# Patient Record
Sex: FEMALE | Race: BLACK OR AFRICAN AMERICAN | NOT HISPANIC OR LATINO | Employment: FULL TIME | URBAN - METROPOLITAN AREA
[De-identification: names, ages, dates, MRNs, and addresses within clinical notes are randomized per-mention and may not be internally consistent; named-entity substitution may affect disease eponyms.]

---

## 2017-06-01 ENCOUNTER — HOSPITAL ENCOUNTER (INPATIENT)
Facility: OTHER | Age: 22
LOS: 1 days | Discharge: HOME | End: 2017-06-02
Attending: EMERGENCY MEDICINE | Admitting: OBSTETRICS & GYNECOLOGY
Payer: MEDICAID

## 2017-06-01 DIAGNOSIS — R00.0 TACHYCARDIA: ICD-10-CM

## 2017-06-01 DIAGNOSIS — I48.91 ATRIAL FIBRILLATION: ICD-10-CM

## 2017-06-01 DIAGNOSIS — I48.91 ATRIAL FIBRILLATION WITH RAPID VENTRICULAR RESPONSE: Primary | ICD-10-CM

## 2017-06-01 DIAGNOSIS — I50.9 HEART FAILURE: ICD-10-CM

## 2017-06-01 DIAGNOSIS — Z3A.33 33 WEEKS GESTATION OF PREGNANCY: ICD-10-CM

## 2017-06-01 PROBLEM — Z3A.34 34 WEEKS GESTATION OF PREGNANCY: Status: ACTIVE | Noted: 2017-06-01

## 2017-06-01 PROBLEM — E87.6 HYPOKALEMIA: Status: ACTIVE | Noted: 2017-06-01

## 2017-06-01 LAB
ALBUMIN SERPL BCP-MCNC: 2.6 G/DL
ALP SERPL-CCNC: 51 U/L
ALT SERPL W/O P-5'-P-CCNC: 15 U/L
ANION GAP SERPL CALC-SCNC: 7 MMOL/L
APTT BLDCRRT: 28.4 SEC
AST SERPL-CCNC: 19 U/L
BASOPHILS # BLD AUTO: 0.01 K/UL
BASOPHILS # BLD AUTO: 0.02 K/UL
BASOPHILS NFR BLD: 0.1 %
BASOPHILS NFR BLD: 0.3 %
BILIRUB SERPL-MCNC: 0.3 MG/DL
BUN SERPL-MCNC: 7 MG/DL
BUN SERPL-MCNC: 9 MG/DL (ref 6–30)
CALCIUM SERPL-MCNC: 8.4 MG/DL
CHLORIDE SERPL-SCNC: 105 MMOL/L (ref 95–110)
CHLORIDE SERPL-SCNC: 113 MMOL/L
CO2 SERPL-SCNC: 22 MMOL/L
CREAT SERPL-MCNC: 0.5 MG/DL (ref 0.5–1.4)
CREAT SERPL-MCNC: 0.6 MG/DL
D DIMER PPP IA.FEU-MCNC: 2.5 MG/L FEU
DIFFERENTIAL METHOD: ABNORMAL
DIFFERENTIAL METHOD: ABNORMAL
EOSINOPHIL # BLD AUTO: 0.1 K/UL
EOSINOPHIL # BLD AUTO: 0.1 K/UL
EOSINOPHIL NFR BLD: 0.7 %
EOSINOPHIL NFR BLD: 0.9 %
ERYTHROCYTE [DISTWIDTH] IN BLOOD BY AUTOMATED COUNT: 13.3 %
ERYTHROCYTE [DISTWIDTH] IN BLOOD BY AUTOMATED COUNT: 13.3 %
EST. GFR  (AFRICAN AMERICAN): >60 ML/MIN/1.73 M^2
EST. GFR  (NON AFRICAN AMERICAN): >60 ML/MIN/1.73 M^2
GLUCOSE SERPL-MCNC: 72 MG/DL
GLUCOSE SERPL-MCNC: 95 MG/DL (ref 70–110)
HCT VFR BLD AUTO: 33.9 %
HCT VFR BLD AUTO: 36.4 %
HCT VFR BLD CALC: 37 %PCV (ref 36–54)
HGB BLD-MCNC: 11.4 G/DL
HGB BLD-MCNC: 12.3 G/DL
INR PPP: 0.9
LACTATE SERPL-SCNC: 1.8 MMOL/L
LYMPHOCYTES # BLD AUTO: 1.9 K/UL
LYMPHOCYTES # BLD AUTO: 2.4 K/UL
LYMPHOCYTES NFR BLD: 25.4 %
LYMPHOCYTES NFR BLD: 35.4 %
MAGNESIUM SERPL-MCNC: 1.6 MG/DL
MAGNESIUM SERPL-MCNC: 1.6 MG/DL
MCH RBC QN AUTO: 30.7 PG
MCH RBC QN AUTO: 31.1 PG
MCHC RBC AUTO-ENTMCNC: 33.6 %
MCHC RBC AUTO-ENTMCNC: 33.8 %
MCV RBC AUTO: 91 FL
MCV RBC AUTO: 92 FL
MONOCYTES # BLD AUTO: 0.4 K/UL
MONOCYTES # BLD AUTO: 0.5 K/UL
MONOCYTES NFR BLD: 6.4 %
MONOCYTES NFR BLD: 6.5 %
NEUTROPHILS # BLD AUTO: 3.8 K/UL
NEUTROPHILS # BLD AUTO: 5.1 K/UL
NEUTROPHILS NFR BLD: 56.3 %
NEUTROPHILS NFR BLD: 67.1 %
PLATELET # BLD AUTO: 187 K/UL
PLATELET # BLD AUTO: 187 K/UL
PLATELET # BLD AUTO: 207 K/UL
PMV BLD AUTO: 10.1 FL
PMV BLD AUTO: 9.9 FL
PMV BLD AUTO: 9.9 FL
POC IONIZED CALCIUM: 1.1 MMOL/L (ref 1.06–1.42)
POC TCO2 (MEASURED): 21 MMOL/L (ref 23–29)
POTASSIUM BLD-SCNC: 3.1 MMOL/L (ref 3.5–5.1)
POTASSIUM SERPL-SCNC: 3.7 MMOL/L
PROT SERPL-MCNC: 5.9 G/DL
PROTHROMBIN TIME: 9.9 SEC
RBC # BLD AUTO: 3.71 M/UL
RBC # BLD AUTO: 3.96 M/UL
SAMPLE: ABNORMAL
SODIUM BLD-SCNC: 138 MMOL/L (ref 136–145)
SODIUM SERPL-SCNC: 142 MMOL/L
TROPONIN I SERPL DL<=0.01 NG/ML-MCNC: 0.01 NG/ML
TSH SERPL DL<=0.005 MIU/L-ACNC: 0.86 UIU/ML
WBC # BLD AUTO: 6.72 K/UL
WBC # BLD AUTO: 7.61 K/UL

## 2017-06-01 PROCEDURE — 85610 PROTHROMBIN TIME: CPT

## 2017-06-01 PROCEDURE — 25000003 PHARM REV CODE 250: Performed by: INTERNAL MEDICINE

## 2017-06-01 PROCEDURE — 80053 COMPREHEN METABOLIC PANEL: CPT

## 2017-06-01 PROCEDURE — 93005 ELECTROCARDIOGRAM TRACING: CPT

## 2017-06-01 PROCEDURE — 85025 COMPLETE CBC W/AUTO DIFF WBC: CPT

## 2017-06-01 PROCEDURE — 99291 CRITICAL CARE FIRST HOUR: CPT | Mod: ,,, | Performed by: EMERGENCY MEDICINE

## 2017-06-01 PROCEDURE — 96361 HYDRATE IV INFUSION ADD-ON: CPT

## 2017-06-01 PROCEDURE — 83735 ASSAY OF MAGNESIUM: CPT

## 2017-06-01 PROCEDURE — 99222 1ST HOSP IP/OBS MODERATE 55: CPT | Mod: ,,, | Performed by: OBSTETRICS & GYNECOLOGY

## 2017-06-01 PROCEDURE — 96376 TX/PRO/DX INJ SAME DRUG ADON: CPT

## 2017-06-01 PROCEDURE — 36415 COLL VENOUS BLD VENIPUNCTURE: CPT

## 2017-06-01 PROCEDURE — 83605 ASSAY OF LACTIC ACID: CPT

## 2017-06-01 PROCEDURE — 93010 ELECTROCARDIOGRAM REPORT: CPT | Mod: ,,, | Performed by: INTERNAL MEDICINE

## 2017-06-01 PROCEDURE — 25500020 PHARM REV CODE 255: Performed by: EMERGENCY MEDICINE

## 2017-06-01 PROCEDURE — 96365 THER/PROPH/DIAG IV INF INIT: CPT

## 2017-06-01 PROCEDURE — 85379 FIBRIN DEGRADATION QUANT: CPT

## 2017-06-01 PROCEDURE — 20000000 HC ICU ROOM

## 2017-06-01 PROCEDURE — 94761 N-INVAS EAR/PLS OXIMETRY MLT: CPT

## 2017-06-01 PROCEDURE — 25000003 PHARM REV CODE 250: Performed by: OBSTETRICS & GYNECOLOGY

## 2017-06-01 PROCEDURE — 96366 THER/PROPH/DIAG IV INF ADDON: CPT

## 2017-06-01 PROCEDURE — 84443 ASSAY THYROID STIM HORMONE: CPT

## 2017-06-01 PROCEDURE — 85025 COMPLETE CBC W/AUTO DIFF WBC: CPT | Mod: 91

## 2017-06-01 PROCEDURE — 99900035 HC TECH TIME PER 15 MIN (STAT)

## 2017-06-01 PROCEDURE — 99223 1ST HOSP IP/OBS HIGH 75: CPT | Mod: ,,, | Performed by: INTERNAL MEDICINE

## 2017-06-01 PROCEDURE — 25000003 PHARM REV CODE 250: Performed by: EMERGENCY MEDICINE

## 2017-06-01 PROCEDURE — 84484 ASSAY OF TROPONIN QUANT: CPT

## 2017-06-01 PROCEDURE — 85730 THROMBOPLASTIN TIME PARTIAL: CPT

## 2017-06-01 PROCEDURE — 83735 ASSAY OF MAGNESIUM: CPT | Mod: 91

## 2017-06-01 PROCEDURE — 99285 EMERGENCY DEPT VISIT HI MDM: CPT | Mod: 25

## 2017-06-01 PROCEDURE — 96375 TX/PRO/DX INJ NEW DRUG ADDON: CPT

## 2017-06-01 RX ORDER — POTASSIUM CHLORIDE 20 MEQ/15ML
60 SOLUTION ORAL
Status: COMPLETED | OUTPATIENT
Start: 2017-06-01 | End: 2017-06-01

## 2017-06-01 RX ORDER — PRENATAL WITH FERROUS FUM AND FOLIC ACID 3080; 920; 120; 400; 22; 1.84; 3; 20; 10; 1; 12; 200; 27; 25; 2 [IU]/1; [IU]/1; MG/1; [IU]/1; MG/1; MG/1; MG/1; MG/1; MG/1; MG/1; UG/1; MG/1; MG/1; MG/1; MG/1
1 TABLET ORAL DAILY
Status: DISCONTINUED | OUTPATIENT
Start: 2017-06-02 | End: 2017-06-02 | Stop reason: HOSPADM

## 2017-06-01 RX ORDER — METOPROLOL TARTRATE 1 MG/ML
5 INJECTION, SOLUTION INTRAVENOUS EVERY 4 HOURS PRN
Status: DISCONTINUED | OUTPATIENT
Start: 2017-06-01 | End: 2017-06-02

## 2017-06-01 RX ORDER — SODIUM CHLORIDE, SODIUM LACTATE, POTASSIUM CHLORIDE, CALCIUM CHLORIDE 600; 310; 30; 20 MG/100ML; MG/100ML; MG/100ML; MG/100ML
INJECTION, SOLUTION INTRAVENOUS CONTINUOUS
Status: DISCONTINUED | OUTPATIENT
Start: 2017-06-01 | End: 2017-06-02

## 2017-06-01 RX ORDER — METOPROLOL TARTRATE 1 MG/ML
5 INJECTION, SOLUTION INTRAVENOUS
Status: COMPLETED | OUTPATIENT
Start: 2017-06-01 | End: 2017-06-01

## 2017-06-01 RX ORDER — ESMOLOL HYDROCHLORIDE 20 MG/ML
50 INJECTION, SOLUTION INTRAVENOUS CONTINUOUS
Status: DISCONTINUED | OUTPATIENT
Start: 2017-06-01 | End: 2017-06-01

## 2017-06-01 RX ORDER — HEPARIN SODIUM 10000 [USP'U]/100ML
17 INJECTION, SOLUTION INTRAVENOUS CONTINUOUS
Status: DISCONTINUED | OUTPATIENT
Start: 2017-06-01 | End: 2017-06-01

## 2017-06-01 RX ORDER — DIPHENHYDRAMINE HYDROCHLORIDE 50 MG/ML
25 INJECTION INTRAMUSCULAR; INTRAVENOUS EVERY 4 HOURS PRN
Status: DISCONTINUED | OUTPATIENT
Start: 2017-06-01 | End: 2017-06-02

## 2017-06-01 RX ORDER — ESMOLOL HYDROCHLORIDE 10 MG/ML
10 INJECTION INTRAVENOUS CONTINUOUS
Status: DISCONTINUED | OUTPATIENT
Start: 2017-06-01 | End: 2017-06-01 | Stop reason: SDUPTHER

## 2017-06-01 RX ORDER — ONDANSETRON 8 MG/1
8 TABLET, ORALLY DISINTEGRATING ORAL EVERY 8 HOURS PRN
Status: DISCONTINUED | OUTPATIENT
Start: 2017-06-01 | End: 2017-06-02

## 2017-06-01 RX ORDER — METOPROLOL TARTRATE 1 MG/ML
5 INJECTION, SOLUTION INTRAVENOUS
Status: DISCONTINUED | OUTPATIENT
Start: 2017-06-01 | End: 2017-06-02

## 2017-06-01 RX ORDER — DILTIAZEM HCL 1 MG/ML
10 INJECTION, SOLUTION INTRAVENOUS CONTINUOUS
Status: DISCONTINUED | OUTPATIENT
Start: 2017-06-01 | End: 2017-06-02

## 2017-06-01 RX ORDER — SIMETHICONE 80 MG
1 TABLET,CHEWABLE ORAL EVERY 6 HOURS PRN
Status: DISCONTINUED | OUTPATIENT
Start: 2017-06-01 | End: 2017-06-02 | Stop reason: HOSPADM

## 2017-06-01 RX ORDER — POTASSIUM CHLORIDE 20 MEQ/15ML
40 SOLUTION ORAL
Status: COMPLETED | OUTPATIENT
Start: 2017-06-01 | End: 2017-06-01

## 2017-06-01 RX ORDER — HEPARIN SODIUM,PORCINE/D5W 25000/250
16 INTRAVENOUS SOLUTION INTRAVENOUS CONTINUOUS
Status: DISCONTINUED | OUTPATIENT
Start: 2017-06-01 | End: 2017-06-02

## 2017-06-01 RX ORDER — AMOXICILLIN 250 MG
1 CAPSULE ORAL NIGHTLY PRN
Status: DISCONTINUED | OUTPATIENT
Start: 2017-06-01 | End: 2017-06-02 | Stop reason: HOSPADM

## 2017-06-01 RX ADMIN — METOPROLOL TARTRATE 5 MG: 5 INJECTION INTRAVENOUS at 03:06

## 2017-06-01 RX ADMIN — METOPROLOL TARTRATE 5 MG: 5 INJECTION INTRAVENOUS at 10:06

## 2017-06-01 RX ADMIN — SODIUM CHLORIDE, SODIUM LACTATE, POTASSIUM CHLORIDE, AND CALCIUM CHLORIDE: .6; .31; .03; .02 INJECTION, SOLUTION INTRAVENOUS at 08:06

## 2017-06-01 RX ADMIN — HEPARIN SODIUM AND DEXTROSE 16 UNITS/KG/HR: 10000; 5 INJECTION INTRAVENOUS at 11:06

## 2017-06-01 RX ADMIN — IOHEXOL 75 ML: 350 INJECTION, SOLUTION INTRAVENOUS at 03:06

## 2017-06-01 RX ADMIN — SODIUM CHLORIDE, SODIUM LACTATE, POTASSIUM CHLORIDE, AND CALCIUM CHLORIDE 1000 ML: .6; .31; .03; .02 INJECTION, SOLUTION INTRAVENOUS at 03:06

## 2017-06-01 RX ADMIN — DILTIAZEM HYDROCHLORIDE 10 MG/HR: 5 INJECTION INTRAVENOUS at 09:06

## 2017-06-01 RX ADMIN — POTASSIUM CHLORIDE 60 MEQ: 20 SOLUTION ORAL at 03:06

## 2017-06-01 RX ADMIN — METOPROLOL TARTRATE 5 MG: 5 INJECTION INTRAVENOUS at 01:06

## 2017-06-01 RX ADMIN — POTASSIUM CHLORIDE 40 MEQ: 40 SOLUTION ORAL at 11:06

## 2017-06-01 RX ADMIN — METOPROLOL TARTRATE 5 MG: 5 INJECTION INTRAVENOUS at 02:06

## 2017-06-01 RX ADMIN — SODIUM CHLORIDE, SODIUM LACTATE, POTASSIUM CHLORIDE, AND CALCIUM CHLORIDE 1000 ML: .6; .31; .03; .02 INJECTION, SOLUTION INTRAVENOUS at 01:06

## 2017-06-01 RX ADMIN — ESMOLOL HYDROCHLORIDE 50 MCG/KG/MIN: 20 INJECTION INTRAVENOUS at 04:06

## 2017-06-01 NOTE — ED TRIAGE NOTES
Pt had walked down to the cafeteria and became extremely diaphoretic and SOB. Pt reported chest tightness. PT is 33 weeks pregnant.

## 2017-06-01 NOTE — ED PROVIDER NOTES
Encounter Date: 6/1/2017    SCRIBE #1 NOTE: I, Karin Lincoln, am scribing for, and in the presence of, Dr. Singh.       History     Chief Complaint   Patient presents with    Tachycardia     Review of patient's allergies indicates:  Allergies not on file  Time seen by provider: 1:29 PM    This is a 22 y.o. female at 33 weeks gestational age  who presents with complaint of acute onset  Of diaphoresis, shortness of breath, and tachycardia beginning shortly prior to arrival. Patient explains she was walking to clinic upstairs when symptoms acutely onset. She also experienced chest tightness at this time, which is now resolved. She denies a hx of tachycardia, DVT, and pulmonary embolism, as well as associated symptoms of leg edema, fever, chest pain, and pelvic pain. Patient endorses an episode of emesis this morning, but otherwise felt at baseline.         The history is provided by the patient and medical records.     History reviewed. No pertinent past medical history.  History reviewed. No pertinent surgical history.  No family history on file.  Social History   Substance Use Topics    Smoking status: Never Smoker    Smokeless tobacco: Not on file    Alcohol use No     Review of Systems   Constitutional: Positive for diaphoresis. Negative for fever.   HENT: Negative for nosebleeds.    Eyes: Negative for redness.   Respiratory: Positive for chest tightness and shortness of breath.    Cardiovascular: Negative for chest pain and leg swelling.   Gastrointestinal: Positive for vomiting.   Genitourinary: Negative for pelvic pain.   Musculoskeletal: Negative for neck pain.   Skin: Negative for rash.   Neurological: Negative for speech difficulty.       Physical Exam     Initial Vitals [06/01/17 1325]   BP Pulse Resp Temp SpO2   138/85 (!) 144 (!) 26 98 °F (36.7 °C) 99 %     Physical Exam    Vitals reviewed.  Constitutional:   22 year old pregnant  female in moderate distress   HENT:   Head: Normocephalic  and atraumatic.   No intraoral lesions noted   Eyes: Pupils are equal, round, and reactive to light.   Neck: No tracheal deviation present.   Cardiovascular: Intact distal pulses. Tachycardia present.    Pulmonary/Chest: No stridor. She is in respiratory distress (mild).   Clear breath sounds bilaterally   Abdominal: There is no tenderness.   Abdomen gravid   Musculoskeletal: She exhibits no edema.   Moves all extremities x 4   Neurological: She is alert and oriented to person, place, and time.   Psychiatric: Thought content normal.   Mildly anxious, appropriate for setting         ED Course   Critical Care  Date/Time: 6/1/2017 1:30 PM  Performed by: REBECCA CRANE  Authorized by: REBECCA CRANE   Direct patient critical care time: 25 minutes  Additional history critical care time: 5 minutes  Ordering / reviewing critical care time: 10 minutes  Documentation critical care time: 5 minutes  Consulting other physicians critical care time: 10 minutes  Total critical care time (exclusive of procedural time) : 55 minutes  Critical care was necessary to treat or prevent imminent or life-threatening deterioration of the following conditions: cardiac failure.  Critical care was time spent personally by me on the following activities: discussions with consultants, evaluation of patient's response to treatment, obtaining history from patient or surrogate, ordering and review of laboratory studies, pulse oximetry, re-evaluation of patient's condition, ordering and review of radiographic studies, ordering and performing treatments and interventions, examination of patient, interpretation of cardiac output measurements and discussions with primary provider.        Labs Reviewed   CBC W/ AUTO DIFFERENTIAL - Abnormal; Notable for the following:        Result Value    RBC 3.96 (*)     Hematocrit 36.4 (*)     MCH 31.1 (*)     All other components within normal limits   D DIMER, QUANTITATIVE - Abnormal; Notable for the  following:     D-Dimer 2.50 (*)     All other components within normal limits    Narrative:     ADD ON PER DR CRANE order [id number] Ddimer @1457 6/1/17   ISTAT PROCEDURE - Abnormal; Notable for the following:     POC Potassium 3.1 (*)     POC TCO2 (MEASURED) 21 (*)     All other components within normal limits   MAGNESIUM   TSH    Narrative:     ADD ON PER DR CRANE order [id number] Ddimer @1457 6/1/17   TROPONIN I    Narrative:     ADD ON PER DR CRANE order [id number] Ddimer @1457 6/1/17   LACTIC ACID, PLASMA   D DIMER, QUANTITATIVE   ISTAT CHEM8     EKG Readings: (Independently Interpreted)   Atrial fibrillation with a normal axis. Twave inversions lead 3 and AVF. 152 bpm     Imaging Results          CTA Chest Non-Coronary (PE Study) (Final result)  Result time 06/01/17 15:48:38    Final result by Lobo Moya III, MD (06/01/17 15:48:38)                 Impression:        1. No evidence of pulmonary thromboembolism.    Preliminary findings of the case were observed by Dr. Hadley at 1540 and discussed by Dr. Hadley with DR. REBECCA CRANE at, 15:45:32 on 06/01/17.  ______________________________________     Electronically signed by resident: DYLAN HADLEY MD  Date:     06/01/17  Time:    15:47            As the supervising and teaching physician, I personally reviewed the images and resident's interpretation and I agree with the findings.            Electronically signed by: LOBO MOYA  Date:     06/01/17  Time:    15:48              Narrative:    Time of Procedure: 06/01/17 15:22:39  Accession # [id number]    Technique: The chest was surveyed from the costophrenic angles through the lung apices at 3-mm increments after the administration of 75 cc of Omnipaque 350 intravenous contrast material according to the PE protocol which is optimized for vascular contrast resolution.  Axial, sagittal and coronal maximum intensity projection images were reviewed.    Comparison:  None    Findings:    Examination of the soft tissue and vascular structures at the base of the neck is unremarkable.    There is a left-sided aortic arch with 3 branch vessels.  The thoracic aorta maintains normal caliber, contour, and course without significant atherosclerotic calcification.  There is no evidence of aneurysmal dilation or dissection.    The pulmonary arteries distribute normally without evidence of filling defect to indicate pulmonary thromboembolism.      The heart is not enlarged and there is no evidence of pericardial effusion.    There is no axillary, mediastinal, or hilar lymph node enlargement.      The esophagus maintains a normal course and caliber.    Limited images of the upper abdomen obtained during the course of this dedicated thoracic CT demonstrate nothing unusual.    The osseous structures are unremarkable.    The trachea and proximal airways are patent.    The lungs are symmetrically expanded and without evidence of consolidation, pneumothorax, mass, or significant pleural thickening.  There is no evidence of pleural fluid.                                Medical Decision Making:   History:   Old Medical Records: I decided to obtain old medical records.  Differential Diagnosis:   Lethal arrhythmia, thyroid dysfunction, pregnancy complication, and pulmonary embolist  Independently Interpreted Test(s):   I have ordered and independently interpreted EKG Reading(s) - see prior notes  Clinical Tests:   Lab Tests: Ordered and Reviewed  Radiological Study: Ordered  Medical Tests: Ordered and Reviewed  Other:   I have discussed this case with another health care provider.            Scribe Attestation:   Scribe #1: I performed the above scribed service and the documentation accurately describes the services I performed. I attest to the accuracy of the note.    Attending Attestation:           Physician Attestation for Scribe:  Physician Attestation Statement for Scribe #1: I, Dr. Singh,  reviewed documentation, as scribed by Karin Lincoln in my presence, and it is both accurate and complete.         Attending ED Notes:   Fetal heart tones 126    Emergency department evaluation today reveals evidence of minimal hypokalemia as well as a moderately elevated serum d-dimer in this patient at 33 weeks gestational age presenting with the acute onset of chest pain, shortness of breath, and significant tachycardia noted to be atrial fibrillation with rapid ventricular response on EKG.  The patient has undergone a CT and she gram of the chest/PE study which does not reveal any evidence of acute embolic disease.  The serum troponin and TSH are notably within normal limits.  The patient has not exhibited sustained improvement of her heart rate with IV beta blocker therapy, and due to her relative hypotension, is not a candidate for diltiazem.  The patient has maintained a clear mental status with adequate oxygenation without a complaint of chest pain throughout her emergency department observation.  An esmolol infusion has been initiated in an attempt to achieve rate control.  Findings and concerns have been discussed with the patient's managing obstetrician Dr. Duran who has accepted the patient for transfer to his inpatient facility for further monitoring and therapy.  She will be transferred to the labor and delivery department of the [facility] campus in fair condition.  1700 - patient exhibits poor response to esmolol therapy with -160 with normal oxygenation and mentation. I must delay transport at this time and consult Cardiology for further recommendations    1830 - Patient has been urgently evaluated by cardiology on call who recommends maximizing esmolol therapy in an attempt to achieve rate control.  The patient continues to exhibit a ventricular rate of 140-1 50 bpm without associated shortness of breath or altered mental status.  In the interim, the accepting physician at [facility] has  determined that this patient requires an ICU setting in maternal-fetal medicine which is not available at that facility.  I have further discussed the patient's presenting complaint and status with another obstetrician at [facility], Dr. Stephen, in an attempt to obtain inpatient management.  At the time of my shift completion, the patient is awaiting acceptance and admission at this facility or [facility] for further therapy and management of her persistent tachycardia in fair condition.              ED Course     Clinical Impression:   The primary encounter diagnosis was Atrial fibrillation with rapid ventricular response. Diagnoses of Tachycardia and 33 weeks gestation of pregnancy were also pertinent to this visit.    Disposition:        Peterson Singh MD  06/01/17 1641       Peterson Singh MD  06/01/17 1833       Peterson Singh MD  06/01/17 183

## 2017-06-01 NOTE — ED NOTES
APPEARANCE: awake and alert in NAD.  SKIN: warm, dry and intact. No breakdown or bruising.  MUSCULOSKELETAL: Patient moving all extremities spontaneously, no obvious swelling or deformities noted. Ambulates independently.  RESPIRATORY:shortness of breath reported. rr 18 even and unlabored All breath sounds CTA bilaterally.  CARDIAC: Tachycardic ; 2+ distal pulses; no peripheral edema  ABDOMEN: S/ND/NT, normoactive bowel sounds present in all four quadrants. Normal stool pattern.  : voids spontaneously without difficulty.  Neurologic: AAO x 4; follows commands equal strength in all extremities; denies numbness/tingling.

## 2017-06-01 NOTE — ED NOTES
Spoke with [facility] and dispatch stated they would have a CCT truck en route and would need to borrow an IV pump and return after transfer

## 2017-06-01 NOTE — PROGRESS NOTES
Spoke with Dr Elke FITZPATRICK at [facility] and she agreed to accept patient in transfer for patient/fetal safety issues.  I subsequently called ED at [facility] and spoke with Peterson Escobedo MD and informed him that [facility] would be following the patient.  Called patient on her cell phone and advised her that she was not coming to [facility] because we thought she could get better care for herself and her baby at [facility] if she had complications.  Patient was happy with the information and promised to call us tomorrow with an update of her status.

## 2017-06-01 NOTE — CONSULTS
"[facility]  Cardiology Consult Note    Attending Physician: Peterson Singh MD  Reason for Consult: AF with RVR    HPI:   Ms. Gilman is a 22 y.o. Lady 33 wk pregnant who was in her usual state of health this afternoon when while walking up stairs at approximately 1:30pm she developed sudden chest tightness, shortness of breath, diaphoresis, and palpitations.  She has never had an event like this before, no history of cardiovascular disease or atrial fibrillation.  Pregnancy has been uncomplicated to date, and she denies any prodromal symptoms.  She does report yesterday she felt a little "off" but denies anything like her current symptoms.  No recent orthopnea, no lower extremity edema, no dyspnea on exertion, no prior chest pain or tightness, no worsening fatigue.  No recent illness, no fever.  No history of DVT or PE.  No family history of difficulties with pregnancy.  No abnormal uterine bleeding.    In the ED she received Metoprolol 5mg x 3, now on esmolol at 150 with rates still in 140s. BP low normal - 100s/50s.  She also got 2L LR thus far. She currently is without symptoms since treatment, feels in her usual state of health.    Review of Systems   Review of Systems   Constitution: Negative.   HENT: Negative.    Eyes: Negative.    Cardiovascular: Positive for chest pain, dyspnea on exertion and palpitations. Negative for claudication, cyanosis, irregular heartbeat, leg swelling, near-syncope, orthopnea, paroxysmal nocturnal dyspnea and syncope.   Respiratory: Positive for shortness of breath.    Endocrine: Negative.    Hematologic/Lymphatic: Negative.    Skin: Negative.    Musculoskeletal: Negative.    Gastrointestinal: Negative.    Genitourinary: Negative.    Neurological: Negative.      PMH:     No prior medical conditions or issues with pregnancy  No prior surgeries     Allergies:     Review of patient's allergies indicates:   Allergen Reactions    Amoxicillin         Medications:     No " outpatient meds     Social History:     Social History   Substance Use Topics    Smoking status: Never Smoker    Smokeless tobacco: Not on file    Alcohol use No        Family History:     No family history on file.     Physical Exam:     Vitals:  Temp:  [98 °F (36.7 °C)]   Pulse:  [131-165]   Resp:  [18-26]   BP: ()/(57-85)   SpO2:  [84 %-100 %]   I/O's:  No intake or output data in the 24 hours ending 06/01/17 1806     Physical Exam   Constitutional: She is oriented to person, place, and time. She appears well-developed and well-nourished. No distress.   HENT:   Head: Atraumatic.   Eyes: EOM are normal. No scleral icterus.   Neck: Normal range of motion. Neck supple. No JVD present.   Cardiovascular: Exam reveals no gallop and no friction rub.    No murmur heard.  Tachycardic, irregular irregular   Pulmonary/Chest: Effort normal and breath sounds normal. No respiratory distress. She has no wheezes. She has no rales. She exhibits no tenderness.   Abdominal: Soft. She exhibits distension. She exhibits no mass. There is no tenderness. There is no rebound and no guarding.   Musculoskeletal: Normal range of motion. She exhibits no edema.   Neurological: She is alert and oriented to person, place, and time.   Skin: Skin is warm and dry. She is not diaphoretic. No erythema.   Psychiatric: She has a normal mood and affect.     Labs:     Recent Results (from the past 336 hour(s))   CBC auto differential    Collection Time: 06/01/17  1:43 PM   Result Value Ref Range    WBC 6.72 3.90 - 12.70 K/uL    Hemoglobin 12.3 12.0 - 16.0 g/dL    Hematocrit 36.4 (L) 37.0 - 48.5 %    Platelets 207 150 - 350 K/uL     CMP notable for hypokalemia 3.1    TSH within normal limits    Recent Labs  Lab 06/01/17  1343   TROPONINI 0.010       D-dimer 2.5    Imaging:  CT negative for PE    EKG:   AF with RVR, no evidence of pre-excitation or ischemia.      Assessment & Recommendations:     Ms. Gilman is a 22 y.o. Lady 33 wk pregnant who  presents with new onset AF with RVR.    #AF with RVR - no si/sx of underlying etiology - CT PE negative, no evidence of heart failure symptoms prior to or currently, TSH within normal limits, no evidence of coronary event, no significant anemia, no recent illness.  She is currently being transferred to [facility] for further OB care, would recommend cardiology evaluation once there.  -Cont esmolol uptitation as BP tolerates  -If she becomes symptomatic and esmolol maximally tolerated can transition to diltiazem as BP tolerates as this could possibly be more effective  -NPO at MN for possible DCCV in the morning if she does not convert spontaneously overnight  -Would perform DCCV with OB assistance for fetal monitoring  -Can consider christopher-cardioversion heparin without bolus - but not absolutely indicated currently in the first 24-48 hours since AF onset- defer to OB from a fetal risk.    Thank you for this consult.  Signed:  Brian Hill M.D.  Cardiology Fellow  Pager: [phone]  6/1/2017 6:06 PM    Attending Addendum:

## 2017-06-01 NOTE — ED NOTES
Pt. Resting in bed in NAD. RR e/u. Continuous cardiac, BP, and O2 monitoring in progress. VS being monitoring continuously per MD orders. Pt. Offered bathroom assistance and denies need at this time. Explanation of care/wait provided. Bed in low, locked position with rails up and call bell in reach. Will continue to monitor.

## 2017-06-02 VITALS
TEMPERATURE: 98 F | WEIGHT: 171.06 LBS | SYSTOLIC BLOOD PRESSURE: 156 MMHG | HEART RATE: 90 BPM | OXYGEN SATURATION: 100 % | HEIGHT: 64 IN | DIASTOLIC BLOOD PRESSURE: 74 MMHG | BODY MASS INDEX: 29.2 KG/M2 | RESPIRATION RATE: 24 BRPM

## 2017-06-02 PROBLEM — I48.91 ATRIAL FIBRILLATION WITH RAPID VENTRICULAR RESPONSE: Status: RESOLVED | Noted: 2017-06-01 | Resolved: 2017-06-02

## 2017-06-02 PROBLEM — E87.6 HYPOKALEMIA: Status: RESOLVED | Noted: 2017-06-01 | Resolved: 2017-06-02

## 2017-06-02 LAB
ABO + RH BLD: NORMAL
AMPHET+METHAMPHET UR QL: NORMAL
ANION GAP SERPL CALC-SCNC: 4 MMOL/L
ANION GAP SERPL CALC-SCNC: 5 MMOL/L
APTT BLDCRRT: 48 SEC
BACTERIA #/AREA URNS HPF: ABNORMAL /HPF
BARBITURATES UR QL SCN>200 NG/ML: NEGATIVE
BASOPHILS # BLD AUTO: 0.02 K/UL
BASOPHILS NFR BLD: 0.3 %
BENZODIAZ UR QL SCN>200 NG/ML: NEGATIVE
BILIRUB UR QL STRIP: NEGATIVE
BLD GP AB SCN CELLS X3 SERPL QL: NORMAL
BUN SERPL-MCNC: 7 MG/DL
BUN SERPL-MCNC: 8 MG/DL
BZE UR QL SCN: NEGATIVE
CALCIUM SERPL-MCNC: 8.5 MG/DL
CALCIUM SERPL-MCNC: 8.7 MG/DL
CANNABINOIDS UR QL SCN: NEGATIVE
CHLORIDE SERPL-SCNC: 110 MMOL/L
CHLORIDE SERPL-SCNC: 111 MMOL/L
CLARITY UR: CLEAR
CO2 SERPL-SCNC: 23 MMOL/L
CO2 SERPL-SCNC: 24 MMOL/L
COLOR UR: YELLOW
CREAT SERPL-MCNC: 0.6 MG/DL
CREAT SERPL-MCNC: 0.6 MG/DL
CREAT UR-MCNC: 60.7 MG/DL
DIASTOLIC DYSFUNCTION: NO
DIFFERENTIAL METHOD: ABNORMAL
EOSINOPHIL # BLD AUTO: 0.1 K/UL
EOSINOPHIL NFR BLD: 0.9 %
ERYTHROCYTE [DISTWIDTH] IN BLOOD BY AUTOMATED COUNT: 13.5 %
EST. GFR  (AFRICAN AMERICAN): >60 ML/MIN/1.73 M^2
EST. GFR  (AFRICAN AMERICAN): >60 ML/MIN/1.73 M^2
EST. GFR  (NON AFRICAN AMERICAN): >60 ML/MIN/1.73 M^2
EST. GFR  (NON AFRICAN AMERICAN): >60 ML/MIN/1.73 M^2
ESTIMATED PA SYSTOLIC PRESSURE: 14.59
ETHANOL UR-MCNC: <10 MG/DL
FIBRINOGEN PPP-MCNC: 371 MG/DL
GLOBAL PERICARDIAL EFFUSION: NORMAL
GLUCOSE SERPL-MCNC: 79 MG/DL
GLUCOSE SERPL-MCNC: 80 MG/DL
GLUCOSE UR QL STRIP: NEGATIVE
HBV SURFACE AG SERPL QL IA: NEGATIVE
HCT VFR BLD AUTO: 32 %
HGB BLD-MCNC: 10.8 G/DL
HGB UR QL STRIP: ABNORMAL
HIV 1+2 AB+HIV1 P24 AG SERPL QL IA: NEGATIVE
KETONES UR QL STRIP: ABNORMAL
LEUKOCYTE ESTERASE UR QL STRIP: ABNORMAL
LYMPHOCYTES # BLD AUTO: 1.7 K/UL
LYMPHOCYTES NFR BLD: 24.1 %
MAGNESIUM SERPL-MCNC: 1.8 MG/DL
MAGNESIUM SERPL-MCNC: 2.1 MG/DL
MCH RBC QN AUTO: 30.9 PG
MCHC RBC AUTO-ENTMCNC: 33.8 %
MCV RBC AUTO: 92 FL
METHADONE UR QL SCN>300 NG/ML: NEGATIVE
MICROSCOPIC COMMENT: ABNORMAL
MONOCYTES # BLD AUTO: 0.4 K/UL
MONOCYTES NFR BLD: 5.7 %
NEUTROPHILS # BLD AUTO: 4.8 K/UL
NEUTROPHILS NFR BLD: 68.3 %
NITRITE UR QL STRIP: NEGATIVE
OPIATES UR QL SCN: NEGATIVE
PCP UR QL SCN>25 NG/ML: NEGATIVE
PH UR STRIP: 7 [PH] (ref 5–8)
PHOSPHATE SERPL-MCNC: 2.6 MG/DL
PLATELET # BLD AUTO: 185 K/UL
PMV BLD AUTO: 10 FL
POTASSIUM SERPL-SCNC: 3.8 MMOL/L
POTASSIUM SERPL-SCNC: 4 MMOL/L
PROT UR QL STRIP: NEGATIVE
RBC # BLD AUTO: 3.49 M/UL
RBC #/AREA URNS HPF: 1 /HPF (ref 0–4)
RETIRED EF AND QEF - SEE NOTES: 60 (ref 55–65)
RPR SER QL: NORMAL
SODIUM SERPL-SCNC: 138 MMOL/L
SODIUM SERPL-SCNC: 139 MMOL/L
SP GR UR STRIP: 1.01 (ref 1–1.03)
SQUAMOUS #/AREA URNS HPF: 8 /HPF
T4 FREE SERPL-MCNC: 0.79 NG/DL
TOXICOLOGY INFORMATION: NORMAL
URN SPEC COLLECT METH UR: ABNORMAL
UROBILINOGEN UR STRIP-ACNC: NEGATIVE EU/DL
WBC # BLD AUTO: 7.05 K/UL
WBC #/AREA URNS HPF: 20 /HPF (ref 0–5)

## 2017-06-02 PROCEDURE — 80048 BASIC METABOLIC PNL TOTAL CA: CPT

## 2017-06-02 PROCEDURE — 80307 DRUG TEST PRSMV CHEM ANLYZR: CPT

## 2017-06-02 PROCEDURE — 84100 ASSAY OF PHOSPHORUS: CPT

## 2017-06-02 PROCEDURE — 83735 ASSAY OF MAGNESIUM: CPT

## 2017-06-02 PROCEDURE — 36415 COLL VENOUS BLD VENIPUNCTURE: CPT

## 2017-06-02 PROCEDURE — 86592 SYPHILIS TEST NON-TREP QUAL: CPT

## 2017-06-02 PROCEDURE — 81000 URINALYSIS NONAUTO W/SCOPE: CPT

## 2017-06-02 PROCEDURE — 99900035 HC TECH TIME PER 15 MIN (STAT)

## 2017-06-02 PROCEDURE — 87081 CULTURE SCREEN ONLY: CPT

## 2017-06-02 PROCEDURE — 86900 BLOOD TYPING SEROLOGIC ABO: CPT

## 2017-06-02 PROCEDURE — 25000003 PHARM REV CODE 250: Performed by: OBSTETRICS & GYNECOLOGY

## 2017-06-02 PROCEDURE — 85025 COMPLETE CBC W/AUTO DIFF WBC: CPT

## 2017-06-02 PROCEDURE — 25000003 PHARM REV CODE 250: Performed by: INTERNAL MEDICINE

## 2017-06-02 PROCEDURE — 99238 HOSP IP/OBS DSCHRG MGMT 30/<: CPT | Mod: ,,, | Performed by: OBSTETRICS & GYNECOLOGY

## 2017-06-02 PROCEDURE — 93306 TTE W/DOPPLER COMPLETE: CPT

## 2017-06-02 PROCEDURE — 84439 ASSAY OF FREE THYROXINE: CPT

## 2017-06-02 PROCEDURE — 86850 RBC ANTIBODY SCREEN: CPT

## 2017-06-02 PROCEDURE — 85384 FIBRINOGEN ACTIVITY: CPT

## 2017-06-02 PROCEDURE — 93005 ELECTROCARDIOGRAM TRACING: CPT

## 2017-06-02 PROCEDURE — 63600175 PHARM REV CODE 636 W HCPCS: Performed by: OBSTETRICS & GYNECOLOGY

## 2017-06-02 PROCEDURE — 87340 HEPATITIS B SURFACE AG IA: CPT

## 2017-06-02 PROCEDURE — 86762 RUBELLA ANTIBODY: CPT

## 2017-06-02 PROCEDURE — 93010 ELECTROCARDIOGRAM REPORT: CPT | Mod: ,,, | Performed by: INTERNAL MEDICINE

## 2017-06-02 PROCEDURE — 86703 HIV-1/HIV-2 1 RESULT ANTBDY: CPT

## 2017-06-02 PROCEDURE — 85730 THROMBOPLASTIN TIME PARTIAL: CPT

## 2017-06-02 PROCEDURE — 87086 URINE CULTURE/COLONY COUNT: CPT

## 2017-06-02 RX ORDER — NITROFURANTOIN 25; 75 MG/1; MG/1
100 CAPSULE ORAL EVERY 12 HOURS
Status: DISCONTINUED | OUTPATIENT
Start: 2017-06-02 | End: 2017-06-02 | Stop reason: HOSPADM

## 2017-06-02 RX ORDER — NITROFURANTOIN 25; 75 MG/1; MG/1
100 CAPSULE ORAL EVERY 12 HOURS
Qty: 12 CAPSULE | Refills: 0 | Status: SHIPPED | OUTPATIENT
Start: 2017-06-02 | End: 2017-06-08

## 2017-06-02 RX ORDER — SODIUM CHLORIDE AND POTASSIUM CHLORIDE 150; 900 MG/100ML; MG/100ML
INJECTION, SOLUTION INTRAVENOUS CONTINUOUS
Status: DISCONTINUED | OUTPATIENT
Start: 2017-06-02 | End: 2017-06-02

## 2017-06-02 RX ORDER — SODIUM,POTASSIUM PHOSPHATES 280-250MG
1 POWDER IN PACKET (EA) ORAL ONCE
Status: DISCONTINUED | OUTPATIENT
Start: 2017-06-02 | End: 2017-06-02

## 2017-06-02 RX ORDER — MAGNESIUM SULFATE HEPTAHYDRATE 40 MG/ML
2 INJECTION, SOLUTION INTRAVENOUS ONCE
Status: COMPLETED | OUTPATIENT
Start: 2017-06-02 | End: 2017-06-02

## 2017-06-02 RX ADMIN — MAGNESIUM SULFATE HEPTAHYDRATE 2 G: 40 INJECTION, SOLUTION INTRAVENOUS at 12:06

## 2017-06-02 RX ADMIN — NITROFURANTOIN MONOHYDRATE AND NITROFURANTOIN MACROCRYSTALLINE 100 MG: 75; 25 CAPSULE ORAL at 08:06

## 2017-06-02 RX ADMIN — PRENATAL VIT W/ FE FUMARATE-FA TAB 27-0.8 MG 1 EACH: 27-0.8 TAB at 08:06

## 2017-06-02 RX ADMIN — METOPROLOL TARTRATE 5 MG: 5 INJECTION INTRAVENOUS at 03:06

## 2017-06-02 NOTE — H&P
HISTORY AND PHYSICAL  ANTEPARTUM          Subjective:       Rhea Gilman is a 22 y.o.  female with IUP at 34w1d measured by stated MORALES with no significant PMHx who presented to the ED today complaining of acute onset chest tightness, diaphoresis, SOB, and tachycardia after walking up stairs.  She was found to be in atrial fibrillation with RVR. A CTA was done and was negative for PE. Labs revealed hypokalemia.    In the ED she received Metoprolol 5mg x 3, now on esmolol at 150 with rates still in 140s. BP low normal - 100s/50s.      She was admitted to cardiology at [facility] and continued on esmolol. She was then transferred to [facility] ICU for fetal monitoring and continued management of Afib w/RVR. Upon arrival to [facility] she was seen by crictical care and cardiology.    Currently patient is asymptomatic. Denies CP, SOB. Reports no uterine contractions, vaginal bleeding or LOF. Feeling active FM.      PMHx: History reviewed. No pertinent past medical history.    PSHx: History reviewed. No pertinent surgical history.    All:   Review of patient's allergies indicates:   Allergen Reactions    Amoxicillin        Meds:   No prescriptions prior to admission.       SH:   Social History     Social History    Marital status:      Spouse name: N/A    Number of children: N/A    Years of education: N/A     Occupational History    Not on file.     Social History Main Topics    Smoking status: Never Smoker    Smokeless tobacco: Not on file    Alcohol use No    Drug use: No    Sexual activity: Not on file     Other Topics Concern    Not on file     Social History Narrative    No narrative on file       FH: No family history on file.    OBHx:   Obstetric History       T0      L0     SAB0   TAB0   Ectopic0   Multiple0   Live Births0       # Outcome Date GA Lbr Carlitos/2nd Weight Sex Delivery Anes PTL Lv   1 Current                   Objective:       BP 96/67   Pulse (!) 114   Temp 98.5 °F (36.9 °C)  "(Oral)   Resp (!) 22   Ht 5' 4" (1.626 m)   Wt 77.6 kg (171 lb 1.2 oz)   SpO2 99%   Breastfeeding? No   BMI 29.37 kg/m²     Vitals:    17 2200 17 2215 17 2258 17 2310   BP: 114/71 96/67     BP Location:       Patient Position:       Pulse: (!) 146 (!) 112 (!) 114    Resp: (!) 23 (!) 22 (!) 22    Temp:       TempSrc:       SpO2: 100% 99% 99%    Weight:    77.6 kg (171 lb 1.2 oz)   Height:           General:   alert, appears stated age and cooperative   Lungs:   clear to auscultation bilaterally   Heart:   irregularly irregular rhythm   Abdomen:  soft, non-tender; bowel sounds normal; no masses,  no organomegaly, gravid   Extremities negative edema, negative erythema   FHT: 130bpm/mod btbv/+accels/no decels                 TOCO: No ctx noted   Presentations: oblique by U/s   Cervix: def     BPP (per Dr. Arrington) -           Assessment:     23yo  at 34w1d gestation admitted to ICU for management of Afib with RVR.    Active Hospital Problems    Diagnosis  POA    Atrial fibrillation with rapid ventricular response [I48.91]  Yes    Hypokalemia [E87.6]  Yes      Resolved Hospital Problems    Diagnosis Date Resolved POA   No resolved problems to display.            Plan:     1. Afib with RVR  - admitted to ICU  - Crictical care and cardiology consulted, appreciate recs  - continue diltiazem drip per cardiology with metoprolol prn for RVR above 110  - continue IV heparin drip for anticoagulation  - currently asymptomatic   - Per cardiology if no cardioversion overnight will need electro-cardioversion  - Will continue to replace electrolytes prn  - echo ordered  - UA, Utox ordered      2. Hypokalemia  - K 3.7  - will replace po    3.  IUP  - consents for vaginal and  delivery signed, blood transfusion consents signed  - Risks, benefits, alternatives and possible complications have been discussed in detail with the patient.   - continuous fetal monitoring, currently " reassuring, BPP 8/8  - NPO at 11 in the event Electro-ardioversion is indicated tomorrow  - PNV daily  - will work on obtaining prenatal records from Dr. Duran at   - will collect GBS  - Patient is allergic to amoxicillin but unsure of allergy, states she will discus wit her mother  - MFM US when able  - glucola test when able           Lidia Cordero M.D.  PGY-3 OB/GYN  [phone]

## 2017-06-02 NOTE — PROGRESS NOTES
[facility]  Cardiology  Progress Note    Patient Name: Rhea Gilman  MRN: [id number]  Admission Date: 6/1/2017  Hospital Length of Stay: 1 days  Code Status: Full Code   Attending Physician: Melo Torres MD   Primary Care Physician: Carole William MD  Expected Discharge Date:   Principal Problem:Atrial fibrillation with rapid ventricular response    Subjective:     Brief HPI:    23 yo female with unremarkable pregnancy in her 33rd week of pregnancy. In the morning of 6/1/2017 she had some nausea. She vomited but was later able to have breakfast. She works on [employer] and when walking to the [employer] she suddenly got short of breath and felt palpitations. She presented to the ER and was noted to be in atrial fibrillation. A CTA was negative for pulmonary embolism. It was felt she should be transferred to [facility] for her further care.    Hospital Course:     Diltiazem iv.    Heparin iv.    Interval History:     Converted to sinus this am.    Review of Systems   Constitution: Negative for chills, fever, weakness and malaise/fatigue.   HENT: Negative for headaches and nosebleeds.    Eyes: Negative for double vision, vision loss in left eye and vision loss in right eye.   Cardiovascular: Negative for chest pain, claudication, dyspnea on exertion, irregular heartbeat, leg swelling, near-syncope, orthopnea, palpitations, paroxysmal nocturnal dyspnea and syncope.   Respiratory: Negative for cough, hemoptysis, shortness of breath and wheezing.    Endocrine: Negative for cold intolerance and heat intolerance.   Hematologic/Lymphatic: Negative for bleeding problem. Does not bruise/bleed easily.   Skin: Negative for color change and rash.   Musculoskeletal: Negative for back pain, falls, muscle weakness and myalgias.   Gastrointestinal: Negative for heartburn, hematemesis, hematochezia, hemorrhoids, jaundice, melena, nausea and vomiting.   Genitourinary: Negative for dysuria and hematuria.   Neurological:  Negative for dizziness, focal weakness, light-headedness, loss of balance, numbness and vertigo.   Psychiatric/Behavioral: Negative for altered mental status, depression and memory loss. The patient is not nervous/anxious.    Allergic/Immunologic: Negative for hives and persistent infections.     Objective:     Vital Signs (Most Recent):  Temp: 98.4 °F (36.9 °C) (06/02/17 0800)  Pulse: 86 (06/02/17 0830)  Resp: (!) 21 (06/02/17 0830)  BP: 113/61 (06/02/17 0830)  SpO2: 100 % (06/02/17 0830) Vital Signs (24h Range):  Temp:  [98 °F (36.7 °C)-98.5 °F (36.9 °C)] 98.4 °F (36.9 °C)  Pulse:  [] 86  Resp:  [11-36] 21  SpO2:  [84 %-100 %] 100 %  BP: ()/(51-85) 113/61     Weight: 77.6 kg (171 lb 1.2 oz)  Body mass index is 29.37 kg/m².    SpO2: 100 %  O2 Device (Oxygen Therapy): room air      Intake/Output Summary (Last 24 hours) at 06/02/17 0926  Last data filed at 06/02/17 0802   Gross per 24 hour   Intake          2355.82 ml   Output                0 ml   Net          2355.82 ml       Lines/Drains/Airways     Peripheral Intravenous Line                 Peripheral IV - Single Lumen 06/01/17 1343 Right Antecubital less than 1 day         Peripheral IV - Single Lumen 06/01/17 1659 Left Antecubital less than 1 day                Physical Exam   Constitutional: She is oriented to person, place, and time. She appears well-developed and well-nourished.  Non-toxic appearance. No distress.   HENT:   Head: Normocephalic and atraumatic.   Nose: Nose normal.   Eyes: Right eye exhibits no discharge. Left eye exhibits no discharge. Right conjunctiva is not injected. Left conjunctiva is not injected. Right pupil is round. Left pupil is round. Pupils are equal.   Neck: Neck supple. No JVD present. Carotid bruit is not present. No thyromegaly present.   Cardiovascular: Normal rate, regular rhythm, S1 normal and S2 normal.   No extrasystoles are present. PMI is not displaced.  Exam reveals no gallop.    Pulses:       Radial  pulses are 2+ on the right side, and 2+ on the left side.        Femoral pulses are 2+ on the right side, and 2+ on the left side.       Dorsalis pedis pulses are 2+ on the right side, and 2+ on the left side.        Posterior tibial pulses are 2+ on the right side, and 2+ on the left side.   Pulmonary/Chest: Effort normal and breath sounds normal.   Abdominal: Soft. Normal appearance. There is no hepatosplenomegaly. There is no tenderness.   Pregnant.   Musculoskeletal:        Right ankle: She exhibits no swelling, no ecchymosis and no deformity.        Left ankle: She exhibits no swelling, no ecchymosis and no deformity.   Lymphadenopathy:        Head (right side): No submandibular adenopathy present.        Head (left side): No submandibular adenopathy present.     She has no cervical adenopathy.   Neurological: She is alert and oriented to person, place, and time. She is not disoriented. No cranial nerve deficit.   Skin: Skin is warm, dry and intact. No rash noted. She is not diaphoretic. No cyanosis. No pallor. Nails show no clubbing.   Psychiatric: She has a normal mood and affect. Her speech is normal and behavior is normal. Judgment and thought content normal. Cognition and memory are normal.     Current Medications:     nitrofurantoin (macrocrystal-monohydrate)  100 mg Oral Q12H    prenatal vitamin  1 tablet Oral Daily     Current Laboratory Results:    Recent Results (from the past 24 hour(s))   CBC auto differential    Collection Time: 06/01/17  1:43 PM   Result Value Ref Range    WBC 6.72 3.90 - 12.70 K/uL    RBC 3.96 (L) 4.00 - 5.40 M/uL    Hemoglobin 12.3 12.0 - 16.0 g/dL    Hematocrit 36.4 (L) 37.0 - 48.5 %    MCV 92 82 - 98 fL    MCH 31.1 (H) 27.0 - 31.0 pg    MCHC 33.8 32.0 - 36.0 %    RDW 13.3 11.5 - 14.5 %    Platelets 207 150 - 350 K/uL    MPV 10.1 9.2 - 12.9 fL    Gran # 3.8 1.8 - 7.7 K/uL    Lymph # 2.4 1.0 - 4.8 K/uL    Mono # 0.4 0.3 - 1.0 K/uL    Eos # 0.1 0.0 - 0.5 K/uL    Baso # 0.02  0.00 - 0.20 K/uL    Gran% 56.3 38.0 - 73.0 %    Lymph% 35.4 18.0 - 48.0 %    Mono% 6.5 4.0 - 15.0 %    Eosinophil% 0.9 0.0 - 8.0 %    Basophil% 0.3 0.0 - 1.9 %    Differential Method Automated    Magnesium    Collection Time: 06/01/17  1:43 PM   Result Value Ref Range    Magnesium 1.6 1.6 - 2.6 mg/dL   TSH    Collection Time: 06/01/17  1:43 PM   Result Value Ref Range    TSH 0.862 0.400 - 4.000 uIU/mL   Troponin I    Collection Time: 06/01/17  1:43 PM   Result Value Ref Range    Troponin I 0.010 0.000 - 0.026 ng/mL   Lactic acid, plasma    Collection Time: 06/01/17  1:43 PM   Result Value Ref Range    Lactate (Lactic Acid) 1.8 0.5 - 2.2 mmol/L   D dimer, quantitative    Collection Time: 06/01/17  1:43 PM   Result Value Ref Range    D-Dimer 2.50 (H) <0.50 mg/L FEU   ISTAT PROCEDURE    Collection Time: 06/01/17  2:09 PM   Result Value Ref Range    POC Glucose 95 70 - 110 mg/dL    POC BUN 9 6 - 30 mg/dL    POC Creatinine 0.5 0.5 - 1.4 mg/dL    POC Sodium 138 136 - 145 mmol/L    POC Potassium 3.1 (L) 3.5 - 5.1 mmol/L    POC Chloride 105 95 - 110 mmol/L    POC TCO2 (MEASURED) 21 (L) 23 - 29 mmol/L    POC Ionized Calcium 1.10 1.06 - 1.42 mmol/L    POC Hematocrit 37 36 - 54 %PCV    Sample AVERY    Comprehensive metabolic panel    Collection Time: 06/01/17  9:45 PM   Result Value Ref Range    Sodium 142 136 - 145 mmol/L    Potassium 3.7 3.5 - 5.1 mmol/L    Chloride 113 (H) 95 - 110 mmol/L    CO2 22 (L) 23 - 29 mmol/L    Glucose 72 70 - 110 mg/dL    BUN, Bld 7 6 - 20 mg/dL    Creatinine 0.6 0.5 - 1.4 mg/dL    Calcium 8.4 (L) 8.7 - 10.5 mg/dL    Total Protein 5.9 (L) 6.0 - 8.4 g/dL    Albumin 2.6 (L) 3.5 - 5.2 g/dL    Total Bilirubin 0.3 0.1 - 1.0 mg/dL    Alkaline Phosphatase 51 (L) 55 - 135 U/L    AST 19 10 - 40 U/L    ALT 15 10 - 44 U/L    Anion Gap 7 (L) 8 - 16 mmol/L    eGFR if African American >60 >60 mL/min/1.73 m^2    eGFR if non African American >60 >60 mL/min/1.73 m^2   Magnesium    Collection Time: 06/01/17  9:45 PM    Result Value Ref Range    Magnesium 1.6 1.6 - 2.6 mg/dL   Platelet count    Collection Time: 06/01/17  9:45 PM   Result Value Ref Range    Platelets 187 150 - 350 K/uL    MPV 9.9 9.2 - 12.9 fL   CBC auto differential    Collection Time: 06/01/17  9:45 PM   Result Value Ref Range    WBC 7.61 3.90 - 12.70 K/uL    RBC 3.71 (L) 4.00 - 5.40 M/uL    Hemoglobin 11.4 (L) 12.0 - 16.0 g/dL    Hematocrit 33.9 (L) 37.0 - 48.5 %    MCV 91 82 - 98 fL    MCH 30.7 27.0 - 31.0 pg    MCHC 33.6 32.0 - 36.0 %    RDW 13.3 11.5 - 14.5 %    Platelets 187 150 - 350 K/uL    MPV 9.9 9.2 - 12.9 fL    Gran # 5.1 1.8 - 7.7 K/uL    Lymph # 1.9 1.0 - 4.8 K/uL    Mono # 0.5 0.3 - 1.0 K/uL    Eos # 0.1 0.0 - 0.5 K/uL    Baso # 0.01 0.00 - 0.20 K/uL    Gran% 67.1 38.0 - 73.0 %    Lymph% 25.4 18.0 - 48.0 %    Mono% 6.4 4.0 - 15.0 %    Eosinophil% 0.7 0.0 - 8.0 %    Basophil% 0.1 0.0 - 1.9 %    Differential Method Automated    APTT    Collection Time: 06/01/17 10:19 PM   Result Value Ref Range    aPTT 28.4 21.0 - 32.0 sec   Protime-INR    Collection Time: 06/01/17 10:19 PM   Result Value Ref Range    Prothrombin Time 9.9 9.0 - 12.5 sec    INR 0.9 0.8 - 1.2   Toxicology screen, urine    Collection Time: 06/02/17 12:29 AM   Result Value Ref Range    Alcohol, Urine <10 <10 mg/dL    Benzodiazepines Negative     Methadone metabolites Negative     Cocaine (Metab.) Negative     Opiate Scrn, Ur Negative     Barbiturate Screen, Ur Negative     Amphetamine Screen, Ur Presumptive Positive     THC Negative     Phencyclidine Negative     Creatinine, Random Ur 60.7 15.0 - 325.0 mg/dL    Toxicology Information SEE COMMENT    Urinalysis    Collection Time: 06/02/17 12:30 AM   Result Value Ref Range    Specimen UA Urine, Clean Catch     Color, UA Yellow Yellow, Straw, Asha    Appearance, UA Clear Clear    pH, UA 7.0 5.0 - 8.0    Specific Gravity, UA 1.010 1.005 - 1.030    Protein, UA Negative Negative    Glucose, UA Negative Negative    Ketones, UA 1+ (A) Negative     Bilirubin (UA) Negative Negative    Occult Blood UA Trace (A) Negative    Nitrite, UA Negative Negative    Urobilinogen, UA Negative <2.0 EU/dL    Leukocytes, UA 3+ (A) Negative   Urinalysis Microscopic    Collection Time: 06/02/17 12:30 AM   Result Value Ref Range    RBC, UA 1 0 - 4 /hpf    WBC, UA 20 (H) 0 - 5 /hpf    Bacteria, UA Moderate (A) None-Occ /hpf    Squam Epithel, UA 8 /hpf    Microscopic Comment SEE COMMENT    Type & Screen    Collection Time: 06/02/17  2:15 AM   Result Value Ref Range    Group & Rh O POS     Indirect Belinda NEG    T4, free    Collection Time: 06/02/17  2:15 AM   Result Value Ref Range    Free T4 0.79 0.71 - 1.51 ng/dL   Basic metabolic panel    Collection Time: 06/02/17  2:15 AM   Result Value Ref Range    Sodium 139 136 - 145 mmol/L    Potassium 4.0 3.5 - 5.1 mmol/L    Chloride 111 (H) 95 - 110 mmol/L    CO2 23 23 - 29 mmol/L    Glucose 80 70 - 110 mg/dL    BUN, Bld 8 6 - 20 mg/dL    Creatinine 0.6 0.5 - 1.4 mg/dL    Calcium 8.7 8.7 - 10.5 mg/dL    Anion Gap 5 (L) 8 - 16 mmol/L    eGFR if African American >60 >60 mL/min/1.73 m^2    eGFR if non African American >60 >60 mL/min/1.73 m^2   Magnesium    Collection Time: 06/02/17  2:15 AM   Result Value Ref Range    Magnesium 2.1 1.6 - 2.6 mg/dL   Phosphorus    Collection Time: 06/02/17  2:15 AM   Result Value Ref Range    Phosphorus 2.6 (L) 2.7 - 4.5 mg/dL   APTT    Collection Time: 06/02/17  6:14 AM   Result Value Ref Range    aPTT 48.0 (H) 21.0 - 32.0 sec   Fibrinogen    Collection Time: 06/02/17  6:15 AM   Result Value Ref Range    Fibrinogen 371 (H) 182 - 366 mg/dL   Basic metabolic panel    Collection Time: 06/02/17  6:28 AM   Result Value Ref Range    Sodium 138 136 - 145 mmol/L    Potassium 3.8 3.5 - 5.1 mmol/L    Chloride 110 95 - 110 mmol/L    CO2 24 23 - 29 mmol/L    Glucose 79 70 - 110 mg/dL    BUN, Bld 7 6 - 20 mg/dL    Creatinine 0.6 0.5 - 1.4 mg/dL    Calcium 8.5 (L) 8.7 - 10.5 mg/dL    Anion Gap 4 (L) 8 - 16 mmol/L     eGFR if African American >60 >60 mL/min/1.73 m^2    eGFR if non African American >60 >60 mL/min/1.73 m^2   Magnesium    Collection Time: 06/02/17  6:28 AM   Result Value Ref Range    Magnesium 1.8 1.6 - 2.6 mg/dL   CBC auto differential    Collection Time: 06/02/17  6:28 AM   Result Value Ref Range    WBC 7.05 3.90 - 12.70 K/uL    RBC 3.49 (L) 4.00 - 5.40 M/uL    Hemoglobin 10.8 (L) 12.0 - 16.0 g/dL    Hematocrit 32.0 (L) 37.0 - 48.5 %    MCV 92 82 - 98 fL    MCH 30.9 27.0 - 31.0 pg    MCHC 33.8 32.0 - 36.0 %    RDW 13.5 11.5 - 14.5 %    Platelets 185 150 - 350 K/uL    MPV 10.0 9.2 - 12.9 fL    Gran # 4.8 1.8 - 7.7 K/uL    Lymph # 1.7 1.0 - 4.8 K/uL    Mono # 0.4 0.3 - 1.0 K/uL    Eos # 0.1 0.0 - 0.5 K/uL    Baso # 0.02 0.00 - 0.20 K/uL    Gran% 68.3 38.0 - 73.0 %    Lymph% 24.1 18.0 - 48.0 %    Mono% 5.7 4.0 - 15.0 %    Eosinophil% 0.9 0.0 - 8.0 %    Basophil% 0.3 0.0 - 1.9 %    Differential Method Automated    2D echo with color flow doppler    Collection Time: 06/02/17  8:28 AM   Result Value Ref Range    EF 60 55 - 65    Diastolic Dysfunction No     Est. PA Systolic Pressure 14.59     Pericardial Effusion NONE      Current Imaging Results:    Imaging Results          CTA Chest Non-Coronary (PE Study) (Final result)  Result time 06/01/17 15:48:38    Final result by Lobo oMya III, MD (06/01/17 15:48:38)                 Impression:        1. No evidence of pulmonary thromboembolism.    Preliminary findings of the case were observed by Dr. Hadley at 1540 and discussed by Dr. Hadley with DR. REBECCA CRANE at, 15:45:32 on 06/01/17.  ______________________________________     Electronically signed by resident: DYLAN HADLEY MD  Date:     06/01/17  Time:    15:47            As the supervising and teaching physician, I personally reviewed the images and resident's interpretation and I agree with the findings.            Electronically signed by: LOBO MOYA  Date:     06/01/17  Time:    15:48               Narrative:    Time of Procedure: 06/01/17 15:22:39  Accession # [id number]    Technique: The chest was surveyed from the costophrenic angles through the lung apices at 3-mm increments after the administration of 75 cc of Omnipaque 350 intravenous contrast material according to the PE protocol which is optimized for vascular contrast resolution.  Axial, sagittal and coronal maximum intensity projection images were reviewed.    Comparison: None    Findings:    Examination of the soft tissue and vascular structures at the base of the neck is unremarkable.    There is a left-sided aortic arch with 3 branch vessels.  The thoracic aorta maintains normal caliber, contour, and course without significant atherosclerotic calcification.  There is no evidence of aneurysmal dilation or dissection.    The pulmonary arteries distribute normally without evidence of filling defect to indicate pulmonary thromboembolism.      The heart is not enlarged and there is no evidence of pericardial effusion.    There is no axillary, mediastinal, or hilar lymph node enlargement.      The esophagus maintains a normal course and caliber.    Limited images of the upper abdomen obtained during the course of this dedicated thoracic CT demonstrate nothing unusual.    The osseous structures are unremarkable.    The trachea and proximal airways are patent.    The lungs are symmetrically expanded and without evidence of consolidation, pneumothorax, mass, or significant pleural thickening.  There is no evidence of pleural fluid.                            ECG: NSR.    Date of Procedure: 06/02/2017        TEST DESCRIPTION   Technical Quality: This is a technically adequate study.     Aorta: The aortic root is normal in size, measuring 2.4 cm at sinotubular junction and 2.3 cm at Sinuses of Valsalva. The proximal ascending aorta is normal in size, measuring 3.1 cm across.     Left Atrium: The left atrial volume index is normal, measuring 37.62  cc/m2.     Left Ventricle: The left ventricle is normal in size, with an end-diastolic diameter of 4.8 cm, and an end-systolic diameter of 3.3 cm. LV wall thickness is normal, with the septum and the posterior wall each measuring 0.8 cm across. Relative wall   thickness was normal at 0.33, and the LV mass index was 78.7 g/m2 consistent with normal left ventricular mass. There are no regional wall motion abnormalities. Left ventricular systolic function appears normal. Visually estimated ejection fraction is   60-65%. The LV Doppler derived stroke volume equals 78.0 ccs.     Diastolic indices: E wave velocity 1.0 m/s, E/A ratio 2.0,  msec., E/e' ratio(avg) 7. Diastolic function is normal.     Right Atrium: The right atrium is normal in size, measuring 4.8 cm in length and 4.6 cm in width in the apical view.     Right Ventricle: The right ventricle is normal in size measuring 3.9 cm at the base in the apical right ventricle-focused view. Global right ventricular systolic function appears normal. Tricuspid annular plane systolic excursion (TAPSE) is 1.9 cm.   Tissue Doppler-derived tricuspid annular peak systolic velocity (S prime) is .1 cm/s. The estimated PA systolic pressure is greater than 15 mmHg.     Aortic Valve:  The aortic valve is thickened.     Mitral Valve:  The mitral valve is normal in structure.     Tricuspid Valve:  The tricuspid valve is normal in structure.     Pulmonary Valve:  The pulmonic valve is normal in structure.     Pericardium: There is no evidence of pericardial effusion.      Intracavitary: There is no evidence of intracavitary mass or thrombus. There is no evidence of vegetation.     CONCLUSIONS     1 - Normal left ventricular systolic function (EF 60-65%).     This document has been electronically    SIGNED BY: Cora Roberts MD On: 06/02/2017 08:58        Assessment and Plan:     Problem List:    Active Diagnoses:    Diagnosis Date Noted POA    PRINCIPAL PROBLEM:  Atrial  fibrillation with rapid ventricular response [I48.91] 06/01/2017 Yes    Hypokalemia [E87.6] 06/01/2017 Yes    34 weeks gestation of pregnancy [Z3A.34] 06/01/2017 Not Applicable      Problems Resolved During this Admission:    Diagnosis Date Noted Date Resolved POA     Assessment and Plan:    1. Atrial Fibrillation                        6/1/2017: Presents with paroxysmal atrial fibrillation.                        6/2/2017: Echo: Normal left ventricular size and systolic function.             Diltiazem iv for rate control.                        Metoprolol iv PRN.                        6/2/2017: Conversion to sinus rhythm.                        No further therapy indicated.      2. Anticoagulation                        Received heparin iv.                        I would not favor anticoagulation.     3. Hypokalemia                        K 3.1.                        Corrected.                        Maybe be cause.    4. Disposition             Home.      VTE Risk Mitigation         Ordered     Medium Risk of VTE  Once      06/01/17 2013     Place TULIO hose  Until discontinued      06/01/17 2013     Place sequential compression device  Until discontinued      06/01/17 2013          Cora Roberts MD  Cardiology  [facility]

## 2017-06-02 NOTE — PROGRESS NOTES
Progress Note  Hospital Day: 2    Rhea Gilman is a 22 y.o.  female with IUP at 34w2d  measured by stated MORALES with no significant PMHx who presented to the ED today complaining of acute onset chest tightness, diaphoresis, SOB, and tachycardia after walking up stairs.  She was found to be in atrial fibrillation with RVR. A CTA was done and was negative for PE. Labs revealed hypokalemia.In the ED she received Metoprolol 5mg x 3, now on esmolol at 150 with rates still in 140s. BP low normal - 100s/50s.  She was admitted to cardiology at [facility] and continued on esmolol. She was then transferred to [facility] ICU for fetal monitoring and continued management of Afib w/RVR. Upon arrival to [facility] she was seen by crictical care and cardiology.    She is doing well this AM. She dnies CP, SOB, fever, chills or any new problems. +FM, no LOF, no VB, no contractions.     PE:  Temp:  [98 °F (36.7 °C)-98.5 °F (36.9 °C)] 98.3 °F (36.8 °C)  Pulse:  [] 100  Resp:  [11-36] 12  SpO2:  [84 %-100 %] 98 %  BP: ()/(51-85) 110/56    No intake/output data recorded.    Wt Readings from Last 1 Encounters:   17 2310 77.6 kg (171 lb 1.2 oz)   17 1945 87.3 kg (192 lb 7.4 oz)   17 1325 77.6 kg (171 lb)       General:   alert, appears stated age and cooperative   Lungs:   clear to auscultation bilaterally   Heart:   irregularly irregular    Abdomen:  soft, non-tender; bowel sounds normal; no masses,  no organomegaly   Uterine Size: 34 weeks   Pelvic: deferred   Extremities: peripheral pulses normal, no pedal edema, no clubbing or cyanosis     NST: 130 BPM, +acc, no dec, mod btbv; Cat 1  [place]: None     Results:  Recent Results (from the past 24 hour(s))   CBC auto differential    Collection Time: 17  1:43 PM   Result Value Ref Range    WBC 6.72 3.90 - 12.70 K/uL    RBC 3.96 (L) 4.00 - 5.40 M/uL    Hemoglobin 12.3 12.0 - 16.0 g/dL    Hematocrit 36.4 (L) 37.0 - 48.5 %    MCV 92 82 - 98 fL    MCH 31.1 (H) 27.0 - 31.0  pg    MCHC 33.8 32.0 - 36.0 %    RDW 13.3 11.5 - 14.5 %    Platelets 207 150 - 350 K/uL    MPV 10.1 9.2 - 12.9 fL    Gran # 3.8 1.8 - 7.7 K/uL    Lymph # 2.4 1.0 - 4.8 K/uL    Mono # 0.4 0.3 - 1.0 K/uL    Eos # 0.1 0.0 - 0.5 K/uL    Baso # 0.02 0.00 - 0.20 K/uL    Gran% 56.3 38.0 - 73.0 %    Lymph% 35.4 18.0 - 48.0 %    Mono% 6.5 4.0 - 15.0 %    Eosinophil% 0.9 0.0 - 8.0 %    Basophil% 0.3 0.0 - 1.9 %    Differential Method Automated    Magnesium    Collection Time: 06/01/17  1:43 PM   Result Value Ref Range    Magnesium 1.6 1.6 - 2.6 mg/dL   TSH    Collection Time: 06/01/17  1:43 PM   Result Value Ref Range    TSH 0.862 0.400 - 4.000 uIU/mL   Troponin I    Collection Time: 06/01/17  1:43 PM   Result Value Ref Range    Troponin I 0.010 0.000 - 0.026 ng/mL   Lactic acid, plasma    Collection Time: 06/01/17  1:43 PM   Result Value Ref Range    Lactate (Lactic Acid) 1.8 0.5 - 2.2 mmol/L   D dimer, quantitative    Collection Time: 06/01/17  1:43 PM   Result Value Ref Range    D-Dimer 2.50 (H) <0.50 mg/L FEU   ISTAT PROCEDURE    Collection Time: 06/01/17  2:09 PM   Result Value Ref Range    POC Glucose 95 70 - 110 mg/dL    POC BUN 9 6 - 30 mg/dL    POC Creatinine 0.5 0.5 - 1.4 mg/dL    POC Sodium 138 136 - 145 mmol/L    POC Potassium 3.1 (L) 3.5 - 5.1 mmol/L    POC Chloride 105 95 - 110 mmol/L    POC TCO2 (MEASURED) 21 (L) 23 - 29 mmol/L    POC Ionized Calcium 1.10 1.06 - 1.42 mmol/L    POC Hematocrit 37 36 - 54 %PCV    Sample AVERY    Comprehensive metabolic panel    Collection Time: 06/01/17  9:45 PM   Result Value Ref Range    Sodium 142 136 - 145 mmol/L    Potassium 3.7 3.5 - 5.1 mmol/L    Chloride 113 (H) 95 - 110 mmol/L    CO2 22 (L) 23 - 29 mmol/L    Glucose 72 70 - 110 mg/dL    BUN, Bld 7 6 - 20 mg/dL    Creatinine 0.6 0.5 - 1.4 mg/dL    Calcium 8.4 (L) 8.7 - 10.5 mg/dL    Total Protein 5.9 (L) 6.0 - 8.4 g/dL    Albumin 2.6 (L) 3.5 - 5.2 g/dL    Total Bilirubin 0.3 0.1 - 1.0 mg/dL    Alkaline Phosphatase 51 (L) 55  - 135 U/L    AST 19 10 - 40 U/L    ALT 15 10 - 44 U/L    Anion Gap 7 (L) 8 - 16 mmol/L    eGFR if African American >60 >60 mL/min/1.73 m^2    eGFR if non African American >60 >60 mL/min/1.73 m^2   Magnesium    Collection Time: 06/01/17  9:45 PM   Result Value Ref Range    Magnesium 1.6 1.6 - 2.6 mg/dL   Platelet count    Collection Time: 06/01/17  9:45 PM   Result Value Ref Range    Platelets 187 150 - 350 K/uL    MPV 9.9 9.2 - 12.9 fL   CBC auto differential    Collection Time: 06/01/17  9:45 PM   Result Value Ref Range    WBC 7.61 3.90 - 12.70 K/uL    RBC 3.71 (L) 4.00 - 5.40 M/uL    Hemoglobin 11.4 (L) 12.0 - 16.0 g/dL    Hematocrit 33.9 (L) 37.0 - 48.5 %    MCV 91 82 - 98 fL    MCH 30.7 27.0 - 31.0 pg    MCHC 33.6 32.0 - 36.0 %    RDW 13.3 11.5 - 14.5 %    Platelets 187 150 - 350 K/uL    MPV 9.9 9.2 - 12.9 fL    Gran # 5.1 1.8 - 7.7 K/uL    Lymph # 1.9 1.0 - 4.8 K/uL    Mono # 0.5 0.3 - 1.0 K/uL    Eos # 0.1 0.0 - 0.5 K/uL    Baso # 0.01 0.00 - 0.20 K/uL    Gran% 67.1 38.0 - 73.0 %    Lymph% 25.4 18.0 - 48.0 %    Mono% 6.4 4.0 - 15.0 %    Eosinophil% 0.7 0.0 - 8.0 %    Basophil% 0.1 0.0 - 1.9 %    Differential Method Automated    APTT    Collection Time: 06/01/17 10:19 PM   Result Value Ref Range    aPTT 28.4 21.0 - 32.0 sec   Protime-INR    Collection Time: 06/01/17 10:19 PM   Result Value Ref Range    Prothrombin Time 9.9 9.0 - 12.5 sec    INR 0.9 0.8 - 1.2   Toxicology screen, urine    Collection Time: 06/02/17 12:29 AM   Result Value Ref Range    Alcohol, Urine <10 <10 mg/dL    Benzodiazepines Negative     Methadone metabolites Negative     Cocaine (Metab.) Negative     Opiate Scrn, Ur Negative     Barbiturate Screen, Ur Negative     Amphetamine Screen, Ur Presumptive Positive     THC Negative     Phencyclidine Negative     Creatinine, Random Ur 60.7 15.0 - 325.0 mg/dL    Toxicology Information SEE COMMENT    Urinalysis    Collection Time: 06/02/17 12:30 AM   Result Value Ref Range    Specimen UA Urine,  Clean Catch     Color, UA Yellow Yellow, Straw, Asha    Appearance, UA Clear Clear    pH, UA 7.0 5.0 - 8.0    Specific Gravity, UA 1.010 1.005 - 1.030    Protein, UA Negative Negative    Glucose, UA Negative Negative    Ketones, UA 1+ (A) Negative    Bilirubin (UA) Negative Negative    Occult Blood UA Trace (A) Negative    Nitrite, UA Negative Negative    Urobilinogen, UA Negative <2.0 EU/dL    Leukocytes, UA 3+ (A) Negative   Urinalysis Microscopic    Collection Time: 06/02/17 12:30 AM   Result Value Ref Range    RBC, UA 1 0 - 4 /hpf    WBC, UA 20 (H) 0 - 5 /hpf    Bacteria, UA Moderate (A) None-Occ /hpf    Squam Epithel, UA 8 /hpf    Microscopic Comment SEE COMMENT    Type & Screen    Collection Time: 06/02/17  2:15 AM   Result Value Ref Range    Group & Rh O POS     Indirect [place]inda NEG    T4, free    Collection Time: 06/02/17  2:15 AM   Result Value Ref Range    Free T4 0.79 0.71 - 1.51 ng/dL   Basic metabolic panel    Collection Time: 06/02/17  2:15 AM   Result Value Ref Range    Sodium 139 136 - 145 mmol/L    Potassium 4.0 3.5 - 5.1 mmol/L    Chloride 111 (H) 95 - 110 mmol/L    CO2 23 23 - 29 mmol/L    Glucose 80 70 - 110 mg/dL    BUN, Bld 8 6 - 20 mg/dL    Creatinine 0.6 0.5 - 1.4 mg/dL    Calcium 8.7 8.7 - 10.5 mg/dL    Anion Gap 5 (L) 8 - 16 mmol/L    eGFR if African American >60 >60 mL/min/1.73 m^2    eGFR if non African American >60 >60 mL/min/1.73 m^2   Magnesium    Collection Time: 06/02/17  2:15 AM   Result Value Ref Range    Magnesium 2.1 1.6 - 2.6 mg/dL   Phosphorus    Collection Time: 06/02/17  2:15 AM   Result Value Ref Range    Phosphorus 2.6 (L) 2.7 - 4.5 mg/dL   APTT    Collection Time: 06/02/17  6:14 AM   Result Value Ref Range    aPTT 48.0 (H) 21.0 - 32.0 sec   Fibrinogen    Collection Time: 06/02/17  6:15 AM   Result Value Ref Range    Fibrinogen 371 (H) 182 - 366 mg/dL   CBC auto differential    Collection Time: 06/02/17  6:28 AM   Result Value Ref Range    WBC 7.05 3.90 - 12.70 K/uL    RBC  3.49 (L) 4.00 - 5.40 M/uL    Hemoglobin 10.8 (L) 12.0 - 16.0 g/dL    Hematocrit 32.0 (L) 37.0 - 48.5 %    MCV 92 82 - 98 fL    MCH 30.9 27.0 - 31.0 pg    MCHC 33.8 32.0 - 36.0 %    RDW 13.5 11.5 - 14.5 %    Platelets 185 150 - 350 K/uL    MPV 10.0 9.2 - 12.9 fL    Gran # 4.8 1.8 - 7.7 K/uL    Lymph # 1.7 1.0 - 4.8 K/uL    Mono # 0.4 0.3 - 1.0 K/uL    Eos # 0.1 0.0 - 0.5 K/uL    Baso # 0.02 0.00 - 0.20 K/uL    Gran% 68.3 38.0 - 73.0 %    Lymph% 24.1 18.0 - 48.0 %    Mono% 5.7 4.0 - 15.0 %    Eosinophil% 0.9 0.0 - 8.0 %    Basophil% 0.3 0.0 - 1.9 %    Differential Method Automated        Assessment:  22 y.o.  female 34w2d HD#2 for Afibb.  Patient Active Problem List   Diagnosis    Atrial fibrillation with rapid ventricular response    Hypokalemia    34 weeks gestation of pregnancy       Plan:    1. Afib with RVR  - admitted to ICU  - Crictical care and cardiology consulted, appreciate recs  - continue diltiazem drip per cardiology with metoprolol prn for RVR above 110  - continue IV heparin drip for anticoagulation  - currently asymptomatic   - Per cardiology if CV this AM   - Will continue to replace electrolytes prn  - echo ordered  - UA, Utox ordered      2. Hypokalemia - resolved      3.  IUP  - continuous fetal monitoring, currently reassuring, BPP 8/8  - NPO at 11 in the event Electro-ardioversion is indicated tomorrow  - PNV daily  - will work on obtaining prenatal records from Dr. Duran at   - Patient is allergic to amoxicillin but unsure of allergy, states she will discus wit her mother  - MFM US when able    Karishma Castrejon MD PGY-3   Ob-Gyn Resident   # [phone]

## 2017-06-02 NOTE — NURSING
Pts VSS. PIVs removed. Pts boyfriend at bedside. Discharge instructions given to pt and pt states she understands them. Pt states she will call and schedule cards follow up and is aware of next prenatal visit date. Macrobid RX given to pt. Pt instructed to take full regimen of antibiotics. Pt denies pain, SOB, dizziness at this time. Pt states she will call her ride.

## 2017-06-02 NOTE — HPI
23 yo female with unremarkable pregnancy in her 33rd week of pregnancy. In the morning of 6/1/2017 she had some nausea. She vomited but was later able to have breakfast. She works on [employer] and when walking to the cafeteria she suddenly got short of breath and felt palpitations. She presented to the ER and was noted to be in atrial fibrillation. A CTA was negative for pulmonary embolism. It was felt she should be transferred to [facility] for her further care.

## 2017-06-02 NOTE — PROGRESS NOTES
Full H and P to follow.  Spoke with Dr. Moncada about the patient. Conference call with Dr. Hill and Dr. Roberts and patient was transferred to [facility] for rate control and cardioversion if needed from Afib. Cardiology desired heparin gtt rather than sq heparin. Echo in am. Diltiazem gtt  FHT 130s baseline with 10x10 and a 15x15 prior to ultrasound.  Fetus oblique currently. BPP 8/8. . Normal DVP.  If fetal status reassuring for 20-30 minutes post cardizem gtt, ok to eat prior to NPO. Labs pending.  ICU consult.  Reviewed concerns of afib in pregnancy and discussed cardioversion and medication use.  Continuous fetal monitoring currently.  Consent for csection.  Hold bmz currently.   Patient will need blood sugar check and 1 hour glucola when able.  MFM ultrasound when able.  Discussed with cardiology. I also reviewed risks of heparin with the patient.

## 2017-06-02 NOTE — PLAN OF CARE
06/02/17 1259   Final Note   Assessment Type Final Discharge Note   Discharge Disposition Home   Discharge planning education complete? Yes   What phone number can be called within the next 1-3 days to see how you are doing after discharge? ([phone])   Hospital Follow Up  Appt(s) scheduled? Yes   Discharge plans and expectations educations in teach back method with documentation complete? Yes   Offered [facility] -- Bedside Delivery? n/a   Discharge/Hospital Encounter Summary to (non-[facility]) PCP n/a   Referral to Outpatient Case Management complete? n/a   Referral to / orders for Home Health Complete? n/a   30 day supply of medicines given at discharge, if documented non-compliance / non-adherence? n/a   Any social issues identified prior to discharge? (Positive tox screen)   Did you assess the readiness or willingness of the family or caregiver to support self management of care? Yes   Right Care Referral Info   Post Acute Recommendation No Care

## 2017-06-02 NOTE — CONSULTS
[facility]  Cardiology  Consult Note    Patient Name: Rhea Gilman  MRN: [id number]  Admission Date: 6/1/2017  Hospital Length of Stay: 0 days  Code Status: Full Code   Attending Provider: Melo Torres MD   Consulting Provider: Cora Roberts MD  Primary Care Physician: Carole William MD  Principal Problem:<principal problem not specified>    Patient information was obtained from patient and past medical records.     Consults  Subjective:     Chief Complaint: Palpitations    HPI:     21 yo female with unremarkable pregnancy in her 33rd week of pregnancy. In the morning of 6/1/2017 she had some nausea. She vomited but was later able to have breakfast. She works on [employer] and when walking to the cafeteria she suddenly got short of breath and felt palpitations. She presented to the ER and was noted to be in atrial fibrillation. A CTA was negative for pulmonary embolism. It was felt [place] [facility] [place].         History reviewed. No pertinent past medical history.    History reviewed. No pertinent surgical history.    Review of patient's allergies indicates:   Allergen Reactions    Amoxicillin        No current facility-administered medications on file prior to encounter.      No current outpatient prescriptions on file prior to encounter.     Family History     None        Social History Main Topics    Smoking status: Never Smoker    Smokeless tobacco: Not on file    Alcohol use No    Drug use: No    Sexual activity: Not on file     Review of Systems   Constitution: Negative for chills, fever, weakness and malaise/fatigue.   HENT: Negative for headaches and nosebleeds.    Eyes: Negative for double vision, vision loss in left eye and vision loss in right eye.   Cardiovascular: Positive for dyspnea on exertion and palpitations. Negative for chest pain, claudication, irregular heartbeat, leg swelling, near-syncope, orthopnea, paroxysmal nocturnal dyspnea  and syncope.   Respiratory: Positive for shortness of breath. Negative for cough, hemoptysis and wheezing.    Endocrine: Negative for cold intolerance and heat intolerance.   Hematologic/Lymphatic: Negative for bleeding problem. Does not bruise/bleed easily.   Skin: Negative for color change and rash.   Musculoskeletal: Negative for back pain, falls, muscle weakness and myalgias.   Gastrointestinal: Positive for nausea. Negative for heartburn, hematemesis, hematochezia, hemorrhoids, jaundice and melena.   Genitourinary: Negative for dysuria and hematuria.   Neurological: Negative for dizziness, focal weakness, light-headedness, loss of balance, numbness and vertigo.   Psychiatric/Behavioral: Negative for altered mental status, depression and memory loss. The patient is not nervous/anxious.    Allergic/Immunologic: Negative for hives and persistent infections.     Objective:     Vital Signs (Most Recent):  Temp: 98 °F (36.7 °C) (06/01/17 1325)  Pulse: (!) 148 (06/01/17 1907)  Resp: 18 (06/01/17 1725)  BP: (!) 103/57 (06/01/17 1907)  SpO2: 100 % (06/01/17 1907) Vital Signs (24h Range):  Temp:  [98 °F (36.7 °C)] 98 °F (36.7 °C)  Pulse:  [131-165] 148  Resp:  [18-26] 18  SpO2:  [84 %-100 %] 100 %  BP: ()/(57-85) 103/57     Weight: 77.6 kg (171 lb)  Body mass index is 29.35 kg/m².    SpO2: 100 %       No intake or output data in the 24 hours ending 06/01/17 2142    Lines/Drains/Airways     Peripheral Intravenous Line                 Peripheral IV - Single Lumen 06/01/17 1343 Right Antecubital less than 1 day         Peripheral IV - Single Lumen 06/01/17 1659 Left Antecubital less than 1 day                Physical Exam   Constitutional: She is oriented to person, place, and time. She appears well-developed and well-nourished.  Non-toxic appearance. No distress.   HENT:   Head: Normocephalic and atraumatic.   Nose: Nose normal.   Eyes: Right eye exhibits no discharge. Left eye exhibits no discharge. Right  conjunctiva is not injected. Left conjunctiva is not injected. Right pupil is round. Left pupil is round. Pupils are equal.   Neck: Neck supple. No JVD present. Carotid bruit is not present. No thyromegaly present.   Cardiovascular: S1 normal and S2 normal.  An irregularly irregular rhythm present.  No extrasystoles are present. Tachycardia present.  PMI is not displaced.  Exam reveals no gallop.    Pulses:       Radial pulses are 2+ on the right side, and 2+ on the left side.        Femoral pulses are 2+ on the right side, and 2+ on the left side.       Dorsalis pedis pulses are 2+ on the right side, and 2+ on the left side.        Posterior tibial pulses are 2+ on the right side, and 2+ on the left side.   Pulmonary/Chest: Effort normal and breath sounds normal.   Abdominal: Soft. Normal appearance. There is no hepatosplenomegaly. There is no tenderness.   Pregnant.   Musculoskeletal:        Right ankle: She exhibits no swelling, no ecchymosis and no deformity.        Left ankle: She exhibits no swelling, no ecchymosis and no deformity.   Lymphadenopathy:        Head (right side): No submandibular adenopathy present.        Head (left side): No submandibular adenopathy present.     She has no cervical adenopathy.   Neurological: She is alert and oriented to person, place, and time. She is not disoriented. No cranial nerve deficit.   Skin: Skin is warm, dry and intact. No rash noted. She is not diaphoretic. No cyanosis. No pallor. Nails show no clubbing.   Psychiatric: She has a normal mood and affect. Her speech is normal and behavior is normal. Judgment and thought content normal. Cognition and memory are normal.     Current Medications:     metoprolol  5 mg Intravenous ED 1 Time    [START ON 6/2/2017] prenatal vitamin  1 tablet Oral Daily     Current Laboratory Results:    Recent Results (from the past 24 hour(s))   CBC auto differential    Collection Time: 06/01/17  1:43 PM   Result Value Ref Range    WBC  6.72 3.90 - 12.70 K/uL    RBC 3.96 (L) 4.00 - 5.40 M/uL    Hemoglobin 12.3 12.0 - 16.0 g/dL    Hematocrit 36.4 (L) 37.0 - 48.5 %    MCV 92 82 - 98 fL    MCH 31.1 (H) 27.0 - 31.0 pg    MCHC 33.8 32.0 - 36.0 %    RDW 13.3 11.5 - 14.5 %    Platelets 207 150 - 350 K/uL    MPV 10.1 9.2 - 12.9 fL    Gran # 3.8 1.8 - 7.7 K/uL    Lymph # 2.4 1.0 - 4.8 K/uL    Mono # 0.4 0.3 - 1.0 K/uL    Eos # 0.1 0.0 - 0.5 K/uL    Baso # 0.02 0.00 - 0.20 K/uL    Gran% 56.3 38.0 - 73.0 %    Lymph% 35.4 18.0 - 48.0 %    Mono% 6.5 4.0 - 15.0 %    Eosinophil% 0.9 0.0 - 8.0 %    Basophil% 0.3 0.0 - 1.9 %    Differential Method Automated    Magnesium    Collection Time: 06/01/17  1:43 PM   Result Value Ref Range    Magnesium 1.6 1.6 - 2.6 mg/dL   TSH    Collection Time: 06/01/17  1:43 PM   Result Value Ref Range    TSH 0.862 0.400 - 4.000 uIU/mL   Troponin I    Collection Time: 06/01/17  1:43 PM   Result Value Ref Range    Troponin I 0.010 0.000 - 0.026 ng/mL   Lactic acid, plasma    Collection Time: 06/01/17  1:43 PM   Result Value Ref Range    Lactate (Lactic Acid) 1.8 0.5 - 2.2 mmol/L   D dimer, quantitative    Collection Time: 06/01/17  1:43 PM   Result Value Ref Range    D-Dimer 2.50 (H) <0.50 mg/L FEU   ISTAT PROCEDURE    Collection Time: 06/01/17  2:09 PM   Result Value Ref Range    POC Glucose 95 70 - 110 mg/dL    POC BUN 9 6 - 30 mg/dL    POC Creatinine 0.5 0.5 - 1.4 mg/dL    POC Sodium 138 136 - 145 mmol/L    POC Potassium 3.1 (L) 3.5 - 5.1 mmol/L    POC Chloride 105 95 - 110 mmol/L    POC TCO2 (MEASURED) 21 (L) 23 - 29 mmol/L    POC Ionized Calcium 1.10 1.06 - 1.42 mmol/L    POC Hematocrit 37 36 - 54 %PCV    Sample AVERY      Current Imaging Results:    Imaging Results          CTA Chest Non-Coronary (PE Study) (Final result)  Result time 06/01/17 15:48:38    Final result by Lobo Moya III, MD (06/01/17 15:48:38)                 Impression:        1. No evidence of pulmonary thromboembolism.    Preliminary findings of the case  were observed by Dr. Hadley at 1540 and discussed by Dr. Hadley with DR. REBECCA CRANE at, 15:45:32 on 06/01/17.  ______________________________________     Electronically signed by resident: DYLAN HADLEY MD  Date:     06/01/17  Time:    15:47            As the supervising and teaching physician, I personally reviewed the images and resident's interpretation and I agree with the findings.            Electronically signed by: MAURICE BOWDEN  Date:     06/01/17  Time:    15:48              Narrative:    Time of Procedure: 06/01/17 15:22:39  Accession # [id number]    Technique: The chest was surveyed from the costophrenic angles through the lung apices at 3-mm increments after the administration of 75 cc of Omnipaque 350 intravenous contrast material according to the PE protocol which is optimized for vascular contrast resolution.  Axial, sagittal and coronal maximum intensity projection images were reviewed.    Comparison: None    Findings:    Examination of the soft tissue and vascular structures at the base of the neck is unremarkable.    There is a left-sided aortic arch with 3 branch vessels.  The thoracic aorta maintains normal caliber, contour, and course without significant atherosclerotic calcification.  There is no evidence of aneurysmal dilation or dissection.    The pulmonary arteries distribute normally without evidence of filling defect to indicate pulmonary thromboembolism.      The heart is not enlarged and there is no evidence of pericardial effusion.    There is no axillary, mediastinal, or hilar lymph node enlargement.      The esophagus maintains a normal course and caliber.    Limited images of the upper abdomen obtained during the course of this dedicated thoracic CT demonstrate nothing unusual.    The osseous structures are unremarkable.    The trachea and proximal airways are patent.    The lungs are symmetrically expanded and without evidence of consolidation, pneumothorax,  mass, or significant pleural thickening.  There is no evidence of pleural fluid.                              ECG: Atrial fibrillation.    Assessment and Plan:     1. Atrial Fibrillation   6/1/2017: Presents with paroxysmal atrial fibrillation.   Diltiazem iv for rate control.   Metoprolol iv PRN.   Hopefully conversion over night otherwise CV.   NPO after 23:00.   ECHO in am.    2. Anticoagulation   Heparin iv for now.    3. Hypokalemia   Correction.   Maybe be cause.       VTE Risk Mitigation         Ordered     Medium Risk of VTE  Once      06/01/17 2013     Place TULIO hose  Until discontinued      06/01/17 2013     Place sequential compression device  Until discontinued      06/01/17 2013          Thank you for your consult.    I will follow-up with patient. Please contact us if you have any additional questions.    Cora Roberts MD  Cardiology   [facility]

## 2017-06-02 NOTE — EICU
22 yr old 33 week pregnant transferred from OSH for Cc: sob, chest tightness, palpitation and diaphoresis from new onset A. Fib RVR.  Received beta blocker, 2 lit IV fluids.  Seen by cards.  On IV esmolol.  CT PA-neg for Pe.  Trop i neg.      Camera : talked to bedside Rn.   to 140, SBP on monitor 106.  MAP ok.  No complaints like chest pain or sob now.      Labs: seen.  Lactate 1.6, wbc normal, hg 12.7, PLT normal.    A;  1. New onset a.fib RVR on esmolol drip.  As per cards from OSH, continue same rx, ok to run HR around 140 so to avoid fetal milton.  No heparin since new onset and to consider DC Cardioversion in am.  Currently stable.     - Told RN to call Cards from [facility], [place] if HR goes up                 Or BP drops for swithcing to cardizem drip                 instead of esmolol   - TSH normal   - 2D Echo by cards  2. 33 wk pregnant, so far no other complication.   - OBG to see her.  If fetal distress might need                 emergent .   - Foetal monitoring closly as per OBGyn                 Protocol.    Other labs fine.  -On TULIO hose for DVT prophylaxis  - Continue IV LR at 100 ml  - no signs of sepsis.  Send UA routine.   - on o2  - I/O monitor in ICU      22:57  Cards, OBGyn notes reviewed.  On cardizem drip and heparin gtt .

## 2017-06-02 NOTE — PLAN OF CARE
06/02/17 1255   Discharge Assessment   Assessment Type Discharge Planning Assessment   Confirmed/corrected address and phone number on facesheet? Yes   Assessment information obtained from? Patient   Expected Length of Stay (days) 1   Communicated expected length of stay with patient/caregiver yes   Prior to hospitilization cognitive status: Alert/Oriented   Prior to hospitalization functional status: Independent   Current cognitive status: Alert/Oriented   Current Functional Status: Independent   Arrived From home or self-care   Lives With parent(s)   Able to Return to Prior Arrangements yes   Is patient able to care for self after discharge? Yes   How many people do you have in your home that can help with your care after discharge? 1   Who are your caregiver(s) and their phone number(s)? Father Keshav Gilman   Patient's perception of discharge disposition home or selfcare   Readmission Within The Last 30 Days no previous admission in last 30 days   Patient currently being followed by outpatient case management? No   Patient currently receives home health services? No   Does the patient currently use HME? No   Patient currently receives private duty nursing? N/A   Patient currently receives any other outside agency services? No   Equipment Currently Used at Home none   Do you have any problems affording any of your prescribed medications? No   Is the patient taking medications as prescribed? yes  (PNV)   Do you have any financial concerns preventing you from receiving the healthcare you need? No   Does the patient have transportation to healthcare appointments? Yes   Transportation Available car   On Dialysis? No   Does the patient receive services at the Coumadin Clinic? No   Are there any open cases? No   Discharge Plan A Home with family   Discharge Plan B Home with family   Patient/Family In Agreement With Plan yes

## 2017-06-02 NOTE — DISCHARGE SUMMARY
Antepartum Discharge Summary  Obstetrics      Primary OB Clinician: Louis Hughesluiscarlota    Admission date: 2017  Discharge date: 2017    Disposition: To home, self care    Admit Dx:      Patient Active Problem List   Diagnosis    34 weeks gestation of pregnancy     Discharge Dx:    Patient Active Problem List   Diagnosis    34 weeks gestation of pregnancy       Procedure: none    Hospital Course:  Rhea Gilman is a 22 y.o. now , who presented to the ED complaining of acute onset chest tightness, diaphoresis, SOB, and tachycardia after walking up stairs. She was found to be in atrial fibrillation with RVR. A CTA was done and was negative for PE. Labs revealed hypokalemia. In the ED she received Metoprolol 5mg x 3, put on esmolol at 150 with rates still in 140s. BP low normal - 100s/50s.  She was admitted to cardiology at [facility] and continued on esmolol. She was then transferred to [facility] ICU for fetal monitoring and continued management of Afib w/RVR and started on diltiazem for rate control. Upon arrival to [facility] she was seen by crictical care and cardiology. Patient found to be hypokalemic which was repleted to 3.8. Patient also started on heparin for anticoagulation given Afib. Patient spontaneously converted on Hospital Day 2. She was discharged on hospital day 2 in stable condition on macrobid for management of her UTI. Per cardiology patient will not be anticoagulated at this time and does not require rate control as already converted. Patient counseled to return for: chest pain, SOB, dizziness, palpitations. Will follow up with cardiology and primary care physicians. Patient has OB appointment on 17.     Pertinent studies:  Postpartum CBC  Lab Results   Component Value Date    WBC 7.05 2017    HGB 10.8 (L) 2017    HCT 32.0 (L) 2017    MCV 92 2017     2017       This patient has no babies on file.    Patient Instructions:   Current Discharge Medication List       START taking these medications    Details   nitrofurantoin, macrocrystal-monohydrate, (MACROBID) 100 MG capsule Take 1 capsule (100 mg total) by mouth every 12 (twelve) hours.  Qty: 12 capsule, Refills: 0               Discharge Procedure Orders  Diet general     Call MD for:  temperature >100.4     Call MD for:  persistent nausea and vomiting or diarrhea     Call MD for:  severe uncontrolled pain     Call MD for:  redness, tenderness, or signs of infection (pain, swelling, redness, odor or green/yellow discharge around incision site)     Call MD for:  difficulty breathing or increased cough     Call MD for:  severe persistent headache       Patient was given instruction on symptoms that should warrant re-evaluation.

## 2017-06-02 NOTE — PROGRESS NOTES
Pt converted into NSR rate 80s at 0720. Diltiazem gtt titrated down, then off per orders. Dr. Roberts notified. Will continue heparin gtt at this time per Dr. Roberts. Will continue to monitor closely.

## 2017-06-02 NOTE — NURSING
OB resident updated on patient's magnesium and potassium levels. Awaiting further orders. Safety maintained. VSS. Safety maintained. Will cont to monitor.  Rachelle Montalvo

## 2017-06-02 NOTE — PLAN OF CARE
Problem: Patient Care Overview  Goal: Plan of Care Review  Outcome: Ongoing (interventions implemented as appropriate)  No changes made at this time. Will continue to monitor.

## 2017-06-02 NOTE — SUBJECTIVE & OBJECTIVE
History reviewed. No pertinent past medical history.    History reviewed. No pertinent surgical history.    Review of patient's allergies indicates:   Allergen Reactions    Amoxicillin        No current facility-administered medications on file prior to encounter.      No current outpatient prescriptions on file prior to encounter.     Family History     None        Social History Main Topics    Smoking status: Never Smoker    Smokeless tobacco: Not on file    Alcohol use No    Drug use: No    Sexual activity: Not on file     Review of Systems   Constitution: Negative for chills, fever, weakness and malaise/fatigue.   HENT: Negative for headaches and nosebleeds.    Eyes: Negative for double vision, vision loss in left eye and vision loss in right eye.   Cardiovascular: Positive for dyspnea on exertion and palpitations. Negative for chest pain, claudication, irregular heartbeat, leg swelling, near-syncope, orthopnea, paroxysmal nocturnal dyspnea and syncope.   Respiratory: Positive for shortness of breath. Negative for cough, hemoptysis and wheezing.    Endocrine: Negative for cold intolerance and heat intolerance.   Hematologic/Lymphatic: Negative for bleeding problem. Does not bruise/bleed easily.   Skin: Negative for color change and rash.   Musculoskeletal: Negative for back pain, falls, muscle weakness and myalgias.   Gastrointestinal: Positive for nausea. Negative for heartburn, hematemesis, hematochezia, hemorrhoids, jaundice and melena.   Genitourinary: Negative for dysuria and hematuria.   Neurological: Negative for dizziness, focal weakness, light-headedness, loss of balance, numbness and vertigo.   Psychiatric/Behavioral: Negative for altered mental status, depression and memory loss. The patient is not nervous/anxious.    Allergic/Immunologic: Negative for hives and persistent infections.     Objective:     Vital Signs (Most Recent):  Temp: 98 °F (36.7 °C) (06/01/17 1325)  Pulse: (!) 148 (06/01/17  1907)  Resp: 18 (06/01/17 1725)  BP: (!) 103/57 (06/01/17 1907)  SpO2: 100 % (06/01/17 1907) Vital Signs (24h Range):  Temp:  [98 °F (36.7 °C)] 98 °F (36.7 °C)  Pulse:  [131-165] 148  Resp:  [18-26] 18  SpO2:  [84 %-100 %] 100 %  BP: ()/(57-85) 103/57     Weight: 77.6 kg (171 lb)  Body mass index is 29.35 kg/m².    SpO2: 100 %       No intake or output data in the 24 hours ending 06/01/17 2142    Lines/Drains/Airways     Peripheral Intravenous Line                 Peripheral IV - Single Lumen 06/01/17 1343 Right Antecubital less than 1 day         Peripheral IV - Single Lumen 06/01/17 1659 Left Antecubital less than 1 day                Physical Exam   Constitutional: She is oriented to person, place, and time. She appears well-developed and well-nourished.  Non-toxic appearance. No distress.   HENT:   Head: Normocephalic and atraumatic.   Nose: Nose normal.   Eyes: Right eye exhibits no discharge. Left eye exhibits no discharge. Right conjunctiva is not injected. Left conjunctiva is not injected. Right pupil is round. Left pupil is round. Pupils are equal.   Neck: Neck supple. No JVD present. Carotid bruit is not present. No thyromegaly present.   Cardiovascular: S1 normal and S2 normal.  An irregularly irregular rhythm present.  No extrasystoles are present. Tachycardia present.  PMI is not displaced.  Exam reveals no gallop.    Pulses:       Radial pulses are 2+ on the right side, and 2+ on the left side.        Femoral pulses are 2+ on the right side, and 2+ on the left side.       Dorsalis pedis pulses are 2+ on the right side, and 2+ on the left side.        Posterior tibial pulses are 2+ on the right side, and 2+ on the left side.   Pulmonary/Chest: Effort normal and breath sounds normal.   Abdominal: Soft. Normal appearance. There is no hepatosplenomegaly. There is no tenderness.   Pregnant.   Musculoskeletal:        Right ankle: She exhibits no swelling, no ecchymosis and no deformity.        Left  ankle: She exhibits no swelling, no ecchymosis and no deformity.   Lymphadenopathy:        Head (right side): No submandibular adenopathy present.        Head (left side): No submandibular adenopathy present.     She has no cervical adenopathy.   Neurological: She is alert and oriented to person, place, and time. She is not disoriented. No cranial nerve deficit.   Skin: Skin is warm, dry and intact. No rash noted. She is not diaphoretic. No cyanosis. No pallor. Nails show no clubbing.   Psychiatric: She has a normal mood and affect. Her speech is normal and behavior is normal. Judgment and thought content normal. Cognition and memory are normal.     Current Medications:     metoprolol  5 mg Intravenous ED 1 Time    [START ON 6/2/2017] prenatal vitamin  1 tablet Oral Daily     Current Laboratory Results:    Recent Results (from the past 24 hour(s))   CBC auto differential    Collection Time: 06/01/17  1:43 PM   Result Value Ref Range    WBC 6.72 3.90 - 12.70 K/uL    RBC 3.96 (L) 4.00 - 5.40 M/uL    Hemoglobin 12.3 12.0 - 16.0 g/dL    Hematocrit 36.4 (L) 37.0 - 48.5 %    MCV 92 82 - 98 fL    MCH 31.1 (H) 27.0 - 31.0 pg    MCHC 33.8 32.0 - 36.0 %    RDW 13.3 11.5 - 14.5 %    Platelets 207 150 - 350 K/uL    MPV 10.1 9.2 - 12.9 fL    Gran # 3.8 1.8 - 7.7 K/uL    Lymph # 2.4 1.0 - 4.8 K/uL    Mono # 0.4 0.3 - 1.0 K/uL    Eos # 0.1 0.0 - 0.5 K/uL    Baso # 0.02 0.00 - 0.20 K/uL    Gran% 56.3 38.0 - 73.0 %    Lymph% 35.4 18.0 - 48.0 %    Mono% 6.5 4.0 - 15.0 %    Eosinophil% 0.9 0.0 - 8.0 %    Basophil% 0.3 0.0 - 1.9 %    Differential Method Automated    Magnesium    Collection Time: 06/01/17  1:43 PM   Result Value Ref Range    Magnesium 1.6 1.6 - 2.6 mg/dL   TSH    Collection Time: 06/01/17  1:43 PM   Result Value Ref Range    TSH 0.862 0.400 - 4.000 uIU/mL   Troponin I    Collection Time: 06/01/17  1:43 PM   Result Value Ref Range    Troponin I 0.010 0.000 - 0.026 ng/mL   Lactic acid, plasma    Collection Time:  06/01/17  1:43 PM   Result Value Ref Range    Lactate (Lactic Acid) 1.8 0.5 - 2.2 mmol/L   D dimer, quantitative    Collection Time: 06/01/17  1:43 PM   Result Value Ref Range    D-Dimer 2.50 (H) <0.50 mg/L FEU   ISTAT PROCEDURE    Collection Time: 06/01/17  2:09 PM   Result Value Ref Range    POC Glucose 95 70 - 110 mg/dL    POC BUN 9 6 - 30 mg/dL    POC Creatinine 0.5 0.5 - 1.4 mg/dL    POC Sodium 138 136 - 145 mmol/L    POC Potassium 3.1 (L) 3.5 - 5.1 mmol/L    POC Chloride 105 95 - 110 mmol/L    POC TCO2 (MEASURED) 21 (L) 23 - 29 mmol/L    POC Ionized Calcium 1.10 1.06 - 1.42 mmol/L    POC Hematocrit 37 36 - 54 %PCV    Sample AVERY      Current Imaging Results:    Imaging Results          CTA Chest Non-Coronary (PE Study) (Final result)  Result time 06/01/17 15:48:38    Final result by Lobo Moya III, MD (06/01/17 15:48:38)                 Impression:        1. No evidence of pulmonary thromboembolism.    Preliminary findings of the case were observed by Dr. Hadley at 1540 and discussed by Dr. Hadley with DR. REBECCA CRANE at, 15:45:32 on 06/01/17.  ______________________________________     Electronically signed by resident: DYLAN HADLEY MD  Date:     06/01/17  Time:    15:47            As the supervising and teaching physician, I personally reviewed the images and resident's interpretation and I agree with the findings.            Electronically signed by: LOBO MOYA  Date:     06/01/17  Time:    15:48              Narrative:    Time of Procedure: 06/01/17 15:22:39  Accession # [id number]    Technique: The chest was surveyed from the costophrenic angles through the lung apices at 3-mm increments after the administration of 75 cc of Omnipaque 350 intravenous contrast material according to the PE protocol which is optimized for vascular contrast resolution.  Axial, sagittal and coronal maximum intensity projection images were reviewed.    Comparison: None    Findings:    Examination of  the soft tissue and vascular structures at the base of the neck is unremarkable.    There is a left-sided aortic arch with 3 branch vessels.  The thoracic aorta maintains normal caliber, contour, and course without significant atherosclerotic calcification.  There is no evidence of aneurysmal dilation or dissection.    The pulmonary arteries distribute normally without evidence of filling defect to indicate pulmonary thromboembolism.      The heart is not enlarged and there is no evidence of pericardial effusion.    There is no axillary, mediastinal, or hilar lymph node enlargement.      The esophagus maintains a normal course and caliber.    Limited images of the upper abdomen obtained during the course of this dedicated thoracic CT demonstrate nothing unusual.    The osseous structures are unremarkable.    The trachea and proximal airways are patent.    The lungs are symmetrically expanded and without evidence of consolidation, pneumothorax, mass, or significant pleural thickening.  There is no evidence of pleural fluid.                              ECG: Atrial fibrillation.

## 2017-06-02 NOTE — PROGRESS NOTES
Discussed case with M physician at [facility].  Given maternal stability - currently completely asymptomatic tolerating rates of 140s well on esmolol gtt - further fetal monitoring would be appropriate given potential for fetal bradycardia on current regimen.  Would not modify current treatment regimen further until this can be obtained.  Currently pursuing transfer to a facility that has this capability as well as emergency  should one be required.    Brian Hill MD

## 2017-06-02 NOTE — CONSULTS
PCCM Consult Note  2017     Reason For Consult: Afib RVR    HPI:  Rhea Gilman is a 22 y.o. female  at 33wks with no PMH admitted with Afib with RVR.  Patient notes acute onset of palpitations this am.  Denies CP/SOB/fever/chills/LE edema/dysuria/diarrhea/n/v.  No history of afib or heart dz.    History reviewed. No pertinent past medical history.    History reviewed. No pertinent surgical history.    No family history on file.    Social History     Social History    Marital status:      Spouse name: N/A    Number of children: N/A    Years of education: N/A     Occupational History    Not on file.     Social History Main Topics    Smoking status: Never Smoker    Smokeless tobacco: Not on file    Alcohol use No    Drug use: No    Sexual activity: Not on file     Other Topics Concern    Not on file     Social History Narrative    No narrative on file       Current Facility-Administered Medications:     diphenhydrAMINE injection 25 mg, 25 mg, Intravenous, Q4H PRN, JEFFERY Roman MD    esmolol 2000 mg in sodium chloride 0.9% 100 mL (20 mg/mL), 50 mcg/kg/min, Intravenous, Continuous, Peterson Singh MD, Last Rate: 34.9 mL/hr at 17, 150 mcg/kg/min at 17    lactated ringers infusion, , Intravenous, Continuous, JEFFERY oRman MD, Last Rate: 100 mL/hr at 17    metoprolol injection 5 mg, 5 mg, Intravenous, ED 1 Time, Peterson Singh MD    ondansetron disintegrating tablet 8 mg, 8 mg, Oral, Q8H PRN, JEFFERY Roman MD    [START ON 2017] prenatal vitamin oral tablet, 1 tablet, Oral, Daily, JEFFERY Roman MD    senna-docusate 8.6-50 mg per tablet 1 tablet, 1 tablet, Oral, Nightly PRN, JEFFERY Roman MD    simethicone chewable tablet 80 mg, 1 tablet, Oral, Q6H PRN, JEFFERY Roman MD    Review of patient's allergies indicates:   Allergen Reactions    Amoxicillin      ROS: negative except that noted in the HPI    Vitals:    17 1732 17  1737 06/01/17 1852 06/01/17 1907   BP: 98/63 (!) 102/58 98/63 (!) 103/57   BP Location:       Patient Position:       Pulse: (!) 147 (!) 149 (!) 146 (!) 148   Resp:       Temp:       TempSrc:       SpO2: 100% 100% 100% 100%   Weight:       Height:         NAD, A&O  Supple  Tachy, irreg  CTAB  S/gravid/nt  No edema/clubbing/cyanosis    Labs:  K and Mg low  TSH wnl  CE negative    Imaging:  CTA-negative    EKG: Afib    Assessment:  1. New Onset Afib-electrolyte abnormalities, tox, structural heart dz; no evidence of hyperthyroidism/PE/infection     Plan:  1. Esmolol  2. Replace lytes  3. UA/Utox/needs formal TTE  4. Would start pharmacologic DVT prophylaxis or anticoagulation if plan to cardiovert    Thank you for the consult.    Andrea Kenney MD  [facility] Pulmonary Fellow  [phone]

## 2017-06-02 NOTE — PROGRESS NOTES
Pt dressed and escorted out with boyfriend. Pt denied use of wheelchair stating that she would rather walk. Pts VSS prior to discharge. Discharge instructions brought home with pt. Belongings including clothing and purse brought home with pt.

## 2017-06-03 LAB
BACTERIA UR CULT: NORMAL
BACTERIA UR CULT: NORMAL

## 2017-06-05 LAB
BACTERIA SPEC AEROBE CULT: NORMAL
RUBV IGG SER-ACNC: 62.2 IU/ML
RUBV IGG SER-IMP: REACTIVE

## 2017-06-21 ENCOUNTER — HOSPITAL ENCOUNTER (EMERGENCY)
Facility: OTHER | Age: 22
Discharge: HOME | End: 2017-06-21
Attending: OBSTETRICS & GYNECOLOGY
Payer: MEDICAID

## 2017-06-21 VITALS — HEART RATE: 81 BPM | DIASTOLIC BLOOD PRESSURE: 59 MMHG | RESPIRATION RATE: 18 BRPM | SYSTOLIC BLOOD PRESSURE: 116 MMHG

## 2017-06-21 DIAGNOSIS — Z3A.37 37 WEEKS GESTATION OF PREGNANCY: ICD-10-CM

## 2017-06-21 DIAGNOSIS — O47.9 UTERINE CONTRACTIONS DURING PREGNANCY: Primary | ICD-10-CM

## 2017-06-21 PROCEDURE — 59025 FETAL NON-STRESS TEST: CPT

## 2017-06-21 PROCEDURE — 99284 EMERGENCY DEPT VISIT MOD MDM: CPT | Mod: 25

## 2017-06-21 PROCEDURE — 99282 EMERGENCY DEPT VISIT SF MDM: CPT | Mod: ,,, | Performed by: OBSTETRICS & GYNECOLOGY

## 2017-06-21 NOTE — Clinical Note
Pt c/o vaginal pressure.  Reports +fm, +ctx , ctx every 15 min, +back pain 7/10.  deneis lof or vb.  Dr braga notified

## 2017-06-21 NOTE — DISCHARGE INSTRUCTIONS
Call clinic [phone] or L & D after hours at [phone] for vaginal bleeding, leakage of fluids, regular contractions every 5 mins for 2 hours, decreased fetal movements ( 10 kicks in 2 hours), headache not relieved by Tylenol, blurry vision, or temp of 100.4 or greater.  Begin doing fetal kick counts, at least 10 movements in 2 hours starting at 28 weeks gestation.  Keep next clinic appointment

## 2017-06-21 NOTE — ED PROVIDER NOTES
Encounter Date: 2017       History     Chief Complaint   Patient presents with    PELVIC PRESSURE     Rhea Gilman is a 22 y.o. F at 37w0d presents complaining of vaginal pressure and contractions that she feels mostly in her back. States they are irregular but painful.   This IUP is complicated by history of Afib with RVR for which she was admitted to ICU here at [facility]. Patient spontaneously converted to NSR before cardioversion was required. Per cardiology she did not require anticoagulation. She denies CP, palpitations, SOB since discharge. Patient reports contractions, denies vaginal bleeding, denies LOF.   Fetal Movement: normal.            Review of patient's allergies indicates:   Allergen Reactions    Amoxicillin      No past medical history on file.  No past surgical history on file.  No family history on file.  Social History   Substance Use Topics    Smoking status: Never Smoker    Smokeless tobacco: Not on file    Alcohol use No     Review of Systems   Constitutional: Negative for fever.   HENT: Negative for sore throat.    Respiratory: Negative for shortness of breath.    Cardiovascular: Negative for chest pain.   Gastrointestinal: Negative for nausea.   Genitourinary: Negative for dysuria.   Musculoskeletal: Negative for back pain.   Skin: Negative for rash.   Neurological: Negative for weakness and headaches.   Hematological: Does not bruise/bleed easily.       Physical Exam     Pulse:  [81] 81  Resp:  [18] 18  BP: (116)/(59) 116/59    Physical Exam    Nursing note and vitals reviewed.  Constitutional: She appears well-developed and well-nourished. She is not diaphoretic. No distress.   HENT:   Head: Normocephalic and atraumatic.   Eyes: Conjunctivae and EOM are normal.   Cardiovascular: Normal rate, regular rhythm and normal heart sounds.   Pulmonary/Chest: Breath sounds normal. No respiratory distress.   Abdominal: Soft. She exhibits no distension. There is no tenderness.    Musculoskeletal: Normal range of motion.   Neurological: She is alert and oriented to person, place, and time.   Skin: Skin is warm and dry.   Psychiatric: She has a normal mood and affect.     OB LABOR EXAM:   Pre-Term Labor: No.   Membranes ruptured: No.   Method: Sterile vaginal exam per MD.   Vaginal Bleeding: none present.   Engagement: ballotable/floating.   Dilatation: 0.   Station: -3.   Amniotic Fluid Color: no fluid.     Comments: NST Interpretation:   140 BPM baseline  Variability: moderate  Accelerations: present  Decelerations: absent  Contractions: irritability    Clinical Impression: Reactive Non-Stress Test         ED Course   Procedures  Labs Reviewed - No data to display                            ED Course     Clinical Impression:   The primary encounter diagnosis was Uterine contractions during pregnancy. A diagnosis of 37 weeks gestation of pregnancy was also pertinent to this visit.                           Nilda Camarillo MD  Resident  06/21/17 1542

## 2017-07-05 ENCOUNTER — HOSPITAL ENCOUNTER (INPATIENT)
Dept: OBSTETRICS AND GYNECOLOGY | Facility: HOSPITAL | Age: 22
LOS: 3 days | Discharge: HOME | End: 2017-07-08
Attending: OBSTETRICS & GYNECOLOGY | Admitting: OBSTETRICS & GYNECOLOGY
Payer: MEDICAID

## 2017-07-05 DIAGNOSIS — Z34.90 PREGNANCY: ICD-10-CM

## 2017-07-05 LAB
ABO + RH BLD: NORMAL
BASOPHILS # BLD AUTO: 0.02 K/UL
BASOPHILS NFR BLD: 0.3 %
BLD GP AB SCN CELLS X3 SERPL QL: NORMAL
DIFFERENTIAL METHOD: ABNORMAL
EOSINOPHIL # BLD AUTO: 0.1 K/UL
EOSINOPHIL NFR BLD: 0.7 %
ERYTHROCYTE [DISTWIDTH] IN BLOOD BY AUTOMATED COUNT: 13.3 %
HCT VFR BLD AUTO: 36.1 %
HGB BLD-MCNC: 12.1 G/DL
LYMPHOCYTES # BLD AUTO: 1.7 K/UL
LYMPHOCYTES NFR BLD: 23.4 %
MCH RBC QN AUTO: 30.6 PG
MCHC RBC AUTO-ENTMCNC: 33.5 %
MCV RBC AUTO: 91 FL
MONOCYTES # BLD AUTO: 0.5 K/UL
MONOCYTES NFR BLD: 6.5 %
NEUTROPHILS # BLD AUTO: 4.9 K/UL
NEUTROPHILS NFR BLD: 69.1 %
PLATELET # BLD AUTO: 213 K/UL
PMV BLD AUTO: 10 FL
RBC # BLD AUTO: 3.95 M/UL
WBC # BLD AUTO: 7.04 K/UL

## 2017-07-05 PROCEDURE — 86850 RBC ANTIBODY SCREEN: CPT

## 2017-07-05 PROCEDURE — 11000001 HC ACUTE MED/SURG PRIVATE ROOM

## 2017-07-05 PROCEDURE — 85025 COMPLETE CBC W/AUTO DIFF WBC: CPT

## 2017-07-05 PROCEDURE — 86592 SYPHILIS TEST NON-TREP QUAL: CPT

## 2017-07-05 PROCEDURE — 63600175 PHARM REV CODE 636 W HCPCS: Performed by: OBSTETRICS & GYNECOLOGY

## 2017-07-05 PROCEDURE — 86900 BLOOD TYPING SEROLOGIC ABO: CPT

## 2017-07-05 PROCEDURE — 25000003 PHARM REV CODE 250: Performed by: OBSTETRICS & GYNECOLOGY

## 2017-07-05 PROCEDURE — 3E0P7GC INTRODUCTION OF OTHER THERAPEUTIC SUBSTANCE INTO FEMALE REPRODUCTIVE, VIA NATURAL OR ARTIFICIAL OPENING: ICD-10-PCS | Performed by: OBSTETRICS & GYNECOLOGY

## 2017-07-05 PROCEDURE — 72100002 HC LABOR CARE, 1ST 8 HOURS

## 2017-07-05 RX ORDER — MEPERIDINE HYDROCHLORIDE 50 MG/ML
50 INJECTION INTRAMUSCULAR; INTRAVENOUS; SUBCUTANEOUS
Status: DISCONTINUED | OUTPATIENT
Start: 2017-07-05 | End: 2017-07-06

## 2017-07-05 RX ORDER — ONDANSETRON 8 MG/1
8 TABLET, ORALLY DISINTEGRATING ORAL EVERY 8 HOURS PRN
Status: DISCONTINUED | OUTPATIENT
Start: 2017-07-05 | End: 2017-07-06

## 2017-07-05 RX ORDER — OXYTOCIN/RINGER'S LACTATE 20/1000 ML
2 PLASTIC BAG, INJECTION (ML) INTRAVENOUS CONTINUOUS
Status: DISCONTINUED | OUTPATIENT
Start: 2017-07-06 | End: 2017-07-06

## 2017-07-05 RX ORDER — ZOLPIDEM TARTRATE 5 MG/1
5 TABLET ORAL NIGHTLY PRN
Status: DISCONTINUED | OUTPATIENT
Start: 2017-07-05 | End: 2017-07-06

## 2017-07-05 RX ORDER — SODIUM CHLORIDE, SODIUM LACTATE, POTASSIUM CHLORIDE, CALCIUM CHLORIDE 600; 310; 30; 20 MG/100ML; MG/100ML; MG/100ML; MG/100ML
INJECTION, SOLUTION INTRAVENOUS CONTINUOUS
Status: DISCONTINUED | OUTPATIENT
Start: 2017-07-06 | End: 2017-07-06

## 2017-07-05 RX ADMIN — SODIUM CHLORIDE, SODIUM LACTATE, POTASSIUM CHLORIDE, AND CALCIUM CHLORIDE 1000 ML: .6; .31; .03; .02 INJECTION, SOLUTION INTRAVENOUS at 10:07

## 2017-07-05 RX ADMIN — MEPERIDINE HYDROCHLORIDE 50 MG: 50 INJECTION INTRAMUSCULAR; INTRAVENOUS; SUBCUTANEOUS at 10:07

## 2017-07-05 RX ADMIN — DINOPROSTONE 10 MG: 10 INSERT, EXTENDED RELEASE VAGINAL at 08:07

## 2017-07-05 RX ADMIN — PROMETHAZINE HYDROCHLORIDE 25 MG: 25 INJECTION INTRAMUSCULAR; INTRAVENOUS at 10:07

## 2017-07-06 ENCOUNTER — ANESTHESIA EVENT (OUTPATIENT)
Dept: OBSTETRICS AND GYNECOLOGY | Facility: HOSPITAL | Age: 22
End: 2017-07-06
Payer: MEDICAID

## 2017-07-06 ENCOUNTER — ANESTHESIA (OUTPATIENT)
Dept: OBSTETRICS AND GYNECOLOGY | Facility: HOSPITAL | Age: 22
End: 2017-07-06
Payer: MEDICAID

## 2017-07-06 PROBLEM — Z3A.39 PREGNANCY WITH 39 COMPLETED WEEKS GESTATION: Status: ACTIVE | Noted: 2017-07-06

## 2017-07-06 PROBLEM — Z34.90 PREGNANCY: Status: RESOLVED | Noted: 2017-07-05 | Resolved: 2017-07-06

## 2017-07-06 PROBLEM — Z34.90 PREGNANCY: Status: ACTIVE | Noted: 2017-07-06

## 2017-07-06 PROBLEM — Z34.90 PREGNANCY: Status: RESOLVED | Noted: 2017-07-06 | Resolved: 2017-07-06

## 2017-07-06 LAB — RPR SER QL: NORMAL

## 2017-07-06 PROCEDURE — 27200710 HC EPIDURAL INFUSION PUMP SET: Performed by: ANESTHESIOLOGY

## 2017-07-06 PROCEDURE — 72100003 HC LABOR CARE, EA. ADDL. 8 HRS

## 2017-07-06 PROCEDURE — 25000003 PHARM REV CODE 250: Performed by: OBSTETRICS & GYNECOLOGY

## 2017-07-06 PROCEDURE — 0HQ9XZZ REPAIR PERINEUM SKIN, EXTERNAL APPROACH: ICD-10-PCS | Performed by: OBSTETRICS & GYNECOLOGY

## 2017-07-06 PROCEDURE — 63600175 PHARM REV CODE 636 W HCPCS: Performed by: ANESTHESIOLOGY

## 2017-07-06 PROCEDURE — 11000001 HC ACUTE MED/SURG PRIVATE ROOM

## 2017-07-06 PROCEDURE — 62326 NJX INTERLAMINAR LMBR/SAC: CPT | Performed by: ANESTHESIOLOGY

## 2017-07-06 PROCEDURE — 27800517 HC TRAY,EPIDURAL-CONTINUOUS: Performed by: ANESTHESIOLOGY

## 2017-07-06 PROCEDURE — 51702 INSERT TEMP BLADDER CATH: CPT

## 2017-07-06 PROCEDURE — 25000003 PHARM REV CODE 250: Performed by: ANESTHESIOLOGY

## 2017-07-06 PROCEDURE — 10907ZC DRAINAGE OF AMNIOTIC FLUID, THERAPEUTIC FROM PRODUCTS OF CONCEPTION, VIA NATURAL OR ARTIFICIAL OPENING: ICD-10-PCS | Performed by: OBSTETRICS & GYNECOLOGY

## 2017-07-06 PROCEDURE — 59409 OBSTETRICAL CARE: CPT | Mod: AA,,, | Performed by: ANESTHESIOLOGY

## 2017-07-06 PROCEDURE — 72200004 HC VAGINAL DELIVERY LEVEL I

## 2017-07-06 RX ORDER — ONDANSETRON 8 MG/1
8 TABLET, ORALLY DISINTEGRATING ORAL EVERY 8 HOURS PRN
Status: DISCONTINUED | OUTPATIENT
Start: 2017-07-06 | End: 2017-07-08 | Stop reason: HOSPADM

## 2017-07-06 RX ORDER — OXYTOCIN/RINGER'S LACTATE 20/1000 ML
41.65 PLASTIC BAG, INJECTION (ML) INTRAVENOUS CONTINUOUS
Status: DISCONTINUED | OUTPATIENT
Start: 2017-07-06 | End: 2017-07-06 | Stop reason: SDUPTHER

## 2017-07-06 RX ORDER — DIPHENHYDRAMINE HCL 25 MG
25 CAPSULE ORAL EVERY 4 HOURS PRN
Status: DISCONTINUED | OUTPATIENT
Start: 2017-07-06 | End: 2017-07-08 | Stop reason: HOSPADM

## 2017-07-06 RX ORDER — OXYCODONE AND ACETAMINOPHEN 5; 325 MG/1; MG/1
1 TABLET ORAL EVERY 4 HOURS PRN
Status: DISCONTINUED | OUTPATIENT
Start: 2017-07-06 | End: 2017-07-08 | Stop reason: HOSPADM

## 2017-07-06 RX ORDER — OXYTOCIN/RINGER'S LACTATE 20/1000 ML
41.65 PLASTIC BAG, INJECTION (ML) INTRAVENOUS CONTINUOUS
Status: DISPENSED | OUTPATIENT
Start: 2017-07-06 | End: 2017-07-06

## 2017-07-06 RX ORDER — OXYCODONE AND ACETAMINOPHEN 10; 325 MG/1; MG/1
1 TABLET ORAL EVERY 4 HOURS PRN
Status: DISCONTINUED | OUTPATIENT
Start: 2017-07-06 | End: 2017-07-08 | Stop reason: HOSPADM

## 2017-07-06 RX ORDER — SODIUM CITRATE AND CITRIC ACID MONOHYDRATE 334; 500 MG/5ML; MG/5ML
30 SOLUTION ORAL ONCE
Status: CANCELLED | OUTPATIENT
Start: 2017-07-06 | End: 2017-07-06

## 2017-07-06 RX ORDER — DOCUSATE SODIUM 100 MG/1
200 CAPSULE, LIQUID FILLED ORAL 2 TIMES DAILY PRN
Status: DISCONTINUED | OUTPATIENT
Start: 2017-07-06 | End: 2017-07-08 | Stop reason: HOSPADM

## 2017-07-06 RX ORDER — BUPIVACAINE HYDROCHLORIDE 2.5 MG/ML
INJECTION, SOLUTION EPIDURAL; INFILTRATION; INTRACAUDAL
Status: DISCONTINUED | OUTPATIENT
Start: 2017-07-06 | End: 2017-07-06

## 2017-07-06 RX ORDER — FAMOTIDINE 10 MG/ML
20 INJECTION INTRAVENOUS ONCE
Status: CANCELLED | OUTPATIENT
Start: 2017-07-06 | End: 2017-07-06

## 2017-07-06 RX ORDER — ACETAMINOPHEN 325 MG/1
650 TABLET ORAL EVERY 6 HOURS PRN
Status: DISCONTINUED | OUTPATIENT
Start: 2017-07-06 | End: 2017-07-08 | Stop reason: HOSPADM

## 2017-07-06 RX ORDER — FENTANYL CITRATE 50 UG/ML
INJECTION, SOLUTION INTRAMUSCULAR; INTRAVENOUS
Status: DISCONTINUED | OUTPATIENT
Start: 2017-07-06 | End: 2017-07-06

## 2017-07-06 RX ORDER — MISOPROSTOL 200 UG/1
600 TABLET ORAL
Status: DISCONTINUED | OUTPATIENT
Start: 2017-07-06 | End: 2017-07-08 | Stop reason: HOSPADM

## 2017-07-06 RX ORDER — FENTANYL/BUPIVACAINE/NS/PF 2MCG/ML-.1
PLASTIC BAG, INJECTION (ML) INJECTION CONTINUOUS PRN
Status: DISCONTINUED | OUTPATIENT
Start: 2017-07-06 | End: 2017-07-06

## 2017-07-06 RX ORDER — AMMONIA 15 % (W/V)
0.3 AMPUL (EA) INHALATION CONTINUOUS PRN
Status: DISCONTINUED | OUTPATIENT
Start: 2017-07-06 | End: 2017-07-08 | Stop reason: HOSPADM

## 2017-07-06 RX ORDER — IBUPROFEN 600 MG/1
600 TABLET ORAL EVERY 6 HOURS
Status: DISCONTINUED | OUTPATIENT
Start: 2017-07-06 | End: 2017-07-08 | Stop reason: HOSPADM

## 2017-07-06 RX ORDER — AMOXICILLIN 250 MG
1 CAPSULE ORAL NIGHTLY
Status: DISCONTINUED | OUTPATIENT
Start: 2017-07-06 | End: 2017-07-08 | Stop reason: HOSPADM

## 2017-07-06 RX ORDER — HYDROCORTISONE 25 MG/G
CREAM TOPICAL 3 TIMES DAILY PRN
Status: DISCONTINUED | OUTPATIENT
Start: 2017-07-06 | End: 2017-07-08 | Stop reason: HOSPADM

## 2017-07-06 RX ORDER — MISOPROSTOL 200 UG/1
200 TABLET ORAL 4 TIMES DAILY PRN
Status: ACTIVE | OUTPATIENT
Start: 2017-07-06 | End: 2017-07-07

## 2017-07-06 RX ORDER — SIMETHICONE 80 MG
1 TABLET,CHEWABLE ORAL EVERY 6 HOURS PRN
Status: DISCONTINUED | OUTPATIENT
Start: 2017-07-06 | End: 2017-07-08 | Stop reason: HOSPADM

## 2017-07-06 RX ORDER — ZOLPIDEM TARTRATE 5 MG/1
5 TABLET ORAL NIGHTLY PRN
Status: DISCONTINUED | OUTPATIENT
Start: 2017-07-06 | End: 2017-07-08 | Stop reason: HOSPADM

## 2017-07-06 RX ORDER — METOCLOPRAMIDE HYDROCHLORIDE 5 MG/ML
10 INJECTION INTRAMUSCULAR; INTRAVENOUS ONCE
Status: CANCELLED | OUTPATIENT
Start: 2017-07-06 | End: 2017-07-06

## 2017-07-06 RX ORDER — FENTANYL/BUPIVACAINE/NS/PF 2MCG/ML-.1
PLASTIC BAG, INJECTION (ML) INJECTION CONTINUOUS
Status: CANCELLED | OUTPATIENT
Start: 2017-07-06

## 2017-07-06 RX ORDER — DIPHENHYDRAMINE HYDROCHLORIDE 50 MG/ML
25 INJECTION INTRAMUSCULAR; INTRAVENOUS EVERY 4 HOURS PRN
Status: DISCONTINUED | OUTPATIENT
Start: 2017-07-06 | End: 2017-07-08 | Stop reason: HOSPADM

## 2017-07-06 RX ADMIN — Medication 10 ML/HR: at 01:07

## 2017-07-06 RX ADMIN — BUPIVACAINE HYDROCHLORIDE 5 ML: 2.5 INJECTION, SOLUTION EPIDURAL; INFILTRATION; INTRACAUDAL; PERINEURAL at 01:07

## 2017-07-06 RX ADMIN — IBUPROFEN 600 MG: 600 TABLET, FILM COATED ORAL at 06:07

## 2017-07-06 RX ADMIN — OXYCODONE HYDROCHLORIDE AND ACETAMINOPHEN 1 TABLET: 10; 325 TABLET ORAL at 10:07

## 2017-07-06 RX ADMIN — Medication 333 MILLI-UNITS/MIN: at 08:07

## 2017-07-06 RX ADMIN — FENTANYL CITRATE 100 MCG: 50 INJECTION INTRAMUSCULAR; INTRAVENOUS at 01:07

## 2017-07-06 RX ADMIN — IBUPROFEN 600 MG: 600 TABLET, FILM COATED ORAL at 10:07

## 2017-07-06 RX ADMIN — Medication 41.65 MILLI-UNITS/MIN: at 09:07

## 2017-07-06 RX ADMIN — SODIUM CHLORIDE, SODIUM LACTATE, POTASSIUM CHLORIDE, AND CALCIUM CHLORIDE: .6; .31; .03; .02 INJECTION, SOLUTION INTRAVENOUS at 01:07

## 2017-07-06 RX ADMIN — DOCUSATE SODIUM AND SENNOSIDES 1 TABLET: 8.6; 5 TABLET, FILM COATED ORAL at 09:07

## 2017-07-06 NOTE — NURSING
Report received from HANDY Cruz RN; care assumed. AAOx3, assessment completed. Right hand #18g IV patent, infusing LR @ 125 cc/h without difficulty, no redness or swelling present. Epidural in progress. Pt turned to left side for comfort.  POC reviewed with pt, pt verbalized understanding. Call bell within reach, will monitor.

## 2017-07-06 NOTE — HOSPITAL COURSE
Had cervidil induction and progressed without any need for pitocin to 9 cm when I was notified.    2017 0900 She is now status post  of viable female apgar 7.8     2017 1800 hours  No excess bleeding or vaginal discharge. C/O left flank pains but tolerable. Not breastfeeding. Heart rate WNL as is her BP.

## 2017-07-06 NOTE — H&P
[facility]  Obstetrics  History & Physical    Patient Name: Rhea Gilman  MRN: [id number]  Admission Date: 2017  Primary Care Provider: Carole William MD    Subjective:     Principal Problem:Pregnancy with 39 completed weeks gestation    History of Present Illness:  Patient presents to L&D for elective induction of labor at 39 weeks. She had episode of atrial fibrillation about 3 weeks ago and was cardio converted at [facility]. She has not had any problems since but wanted to be delivered as soon as possible so she did not have to do a lot of pushing during delivery    Obstetric HPI:  Patient reports Frequency: Every 3 minutes contractions, active fetal movement, No vaginal bleeding , No loss of fluid     This pregnancy has been complicated by atrial fibrillation converted successfully     Obstetric History       T0      L0     SAB0   TAB0   Ectopic0   Multiple0   Live Births0       # Outcome Date GA Lbr Carlitos/2nd Weight Sex Delivery Anes PTL Lv   1 Current                 Past Medical History:   Diagnosis Date    Anemia     Atrial fibrillation     converted with calcium channel blockers at [facility]     History reviewed. No pertinent surgical history.    No prescriptions prior to admission.       Review of patient's allergies indicates:   Allergen Reactions    Amoxicillin         Family History     None        Social History Main Topics    Smoking status: Never Smoker    Smokeless tobacco: Never Used    Alcohol use No    Drug use: No    Sexual activity: Yes     Partners: Male     Birth control/ protection: None     Review of Systems   Constitutional: Negative.    HENT: Negative.    Eyes: Negative.    Respiratory: Negative.    Cardiovascular: Negative.    Gastrointestinal: Negative.    Endocrine: Negative.    Genitourinary: Negative.    Musculoskeletal: Negative.    Skin:  Negative.   Neurological: Negative.    Hematological: Negative.    Psychiatric/Behavioral: Negative.     Breast: negative.       Objective:     Vital Signs (Most Recent):  Temp: 98.4 °F (36.9 °C) (07/06/17 0705)  Pulse: 110 (07/06/17 0851)  Resp: 20 (07/06/17 0705)  BP: (!) 144/65 (07/06/17 0851)  SpO2: 100 % (07/06/17 0848) Vital Signs (24h Range):  Temp:  [98.4 °F (36.9 °C)] 98.4 °F (36.9 °C)  Pulse:  [] 110  Resp:  [18-20] 20  SpO2:  [91 %-100 %] 100 %  BP: (112-149)/(64-99) 144/65     Weight: 88 kg (194 lb)  Body mass index is 33.3 kg/m².    FHT: 140 Cat 1 (reassuring)  TOCO:  Q 3 minutes    Physical Exam:   Constitutional: She is oriented to person, place, and time. She appears well-developed and well-nourished.    HENT:   Head: Normocephalic and atraumatic.   Nose: Nose normal.    Eyes: Conjunctivae and EOM are normal. Pupils are equal, round, and reactive to light.    Neck: Normal range of motion. Neck supple.    Cardiovascular: Normal rate, regular rhythm, normal heart sounds and intact distal pulses.     Pulmonary/Chest: Effort normal and breath sounds normal.        Abdominal: Soft. Bowel sounds are normal.             Musculoskeletal: Normal range of motion and moves all extremeties.       Neurological: She is alert and oriented to person, place, and time. She has normal reflexes.    Skin: Skin is warm and dry.    Psychiatric: She has a normal mood and affect. Her behavior is normal. Judgment and thought content normal.       Cervix:  Dilation:  10  Effacement:  100%  Station: 1  Presentation: Vertex     Significant Labs:  Lab Results   Component Value Date    GROUPTRH O POS 07/05/2017    HEPBSAG Negative 06/02/2017    STREPBCULT No Group B Streptococcus isolated 06/02/2017       CBC:   Recent Labs  Lab 07/05/17 2016   WBC 7.04   RBC 3.95*   HGB 12.1   HCT 36.1*      MCV 91   MCH 30.6   MCHC 33.5     CMP: No results for input(s): GLU, CALCIUM, ALBUMIN, PROT, NA, K, CO2, CL, BUN, CREATININE, ALKPHOS, ALT, AST, BILITOT in the last 48 hours.  I have personallly reviewed all pertinent lab results  from the last 24 hours.    Assessment/Plan:     22 y.o. female  at 39w1d for:    Atrial fibrillation    Resolved before delivery        * Pregnancy with 39 completed weeks gestation    Term pregnancy  Plan is to deliver            Evangelist Moncada MD  Obstetrics  [facility]

## 2017-07-06 NOTE — PLAN OF CARE
Problem: Patient Care Overview  Goal: Plan of Care Review  Outcome: Ongoing (interventions implemented as appropriate)  POC reviewed with pt, pt verb understanding  Bottle feeding  Dr. Sevilla for peds

## 2017-07-06 NOTE — HPI
Patient presents to L&D for elective induction of labor at 39 weeks. She had episode of atrial fibrillation about 5 weeks ago and was cardio converted at [facility]. She has not had any problems since but wanted to be delivered as soon as possible so she did not have to do a lot of pushing during delivery

## 2017-07-06 NOTE — TREATMENT PLAN
Pt to unit for scheduled induction.  Placed on monitor x2.  Complete hx reviewed. SL IV started, labs drawn.  POC discussed with pt and s.o.

## 2017-07-06 NOTE — PLAN OF CARE
Problem: Patient Care Overview  Goal: Plan of Care Review  Outcome: Ongoing (interventions implemented as appropriate)  Pt. tolerating pain with scheduled motrin and prn percocet. Tolerating regular diet.  Enc. Pt. To ambulate today. I&o today. poc reviewed with pt. Positive bonding noted with infant. Vss.

## 2017-07-06 NOTE — SUBJECTIVE & OBJECTIVE
Obstetric HPI:  Patient reports Frequency: Every 3 minutes contractions, active fetal movement, No vaginal bleeding , No loss of fluid     This pregnancy has been complicated by atrial fibrillation converted successfully     Obstetric History       T0      L0     SAB0   TAB0   Ectopic0   Multiple0   Live Births0       # Outcome Date GA Lbr Carlitos/2nd Weight Sex Delivery Anes PTL Lv   1 Current                 Past Medical History:   Diagnosis Date    Anemia     Atrial fibrillation     converted with calcium channel blockers at [facility]     History reviewed. No pertinent surgical history.    No prescriptions prior to admission.       Review of patient's allergies indicates:   Allergen Reactions    Amoxicillin         Family History     None        Social History Main Topics    Smoking status: Never Smoker    Smokeless tobacco: Never Used    Alcohol use No    Drug use: No    Sexual activity: Yes     Partners: Male     Birth control/ protection: None     Review of Systems   Constitutional: Negative.    HENT: Negative.    Eyes: Negative.    Respiratory: Negative.    Cardiovascular: Negative.    Gastrointestinal: Negative.    Endocrine: Negative.    Genitourinary: Negative.    Musculoskeletal: Negative.    Skin:  Negative.   Neurological: Negative.    Hematological: Negative.    Psychiatric/Behavioral: Negative.    Breast: negative.       Objective:     Vital Signs (Most Recent):  Temp: 98.4 °F (36.9 °C) (17)  Pulse: 110 (17)  Resp: 20 (17)  BP: (!) 144/65 (17 0851)  SpO2: 100 % (17 0848) Vital Signs (24h Range):  Temp:  [98.4 °F (36.9 °C)] 98.4 °F (36.9 °C)  Pulse:  [] 110  Resp:  [18-20] 20  SpO2:  [91 %-100 %] 100 %  BP: (112-149)/(64-99) 144/65     Weight: 88 kg (194 lb)  Body mass index is 33.3 kg/m².    FHT: 140 Cat 1 (reassuring)  TOCO:  Q 3 minutes    Physical Exam:   Constitutional: She is oriented to person, place, and time. She appears  well-developed and well-nourished.    HENT:   Head: Normocephalic and atraumatic.   Nose: Nose normal.    Eyes: Conjunctivae and EOM are normal. Pupils are equal, round, and reactive to light.    Neck: Normal range of motion. Neck supple.    Cardiovascular: Normal rate, regular rhythm, normal heart sounds and intact distal pulses.     Pulmonary/Chest: Effort normal and breath sounds normal.        Abdominal: Soft. Bowel sounds are normal.             Musculoskeletal: Normal range of motion and moves all extremeties.       Neurological: She is alert and oriented to person, place, and time. She has normal reflexes.    Skin: Skin is warm and dry.    Psychiatric: She has a normal mood and affect. Her behavior is normal. Judgment and thought content normal.       Cervix:  Dilation:  10  Effacement:  100%  Station: 1  Presentation: Vertex     Significant Labs:  Lab Results   Component Value Date    GROUPTRH O POS 07/05/2017    HEPBSAG Negative 06/02/2017    STREPBCULT No Group B Streptococcus isolated 06/02/2017       CBC:   Recent Labs  Lab 07/05/17 2016   WBC 7.04   RBC 3.95*   HGB 12.1   HCT 36.1*      MCV 91   MCH 30.6   MCHC 33.5     CMP: No results for input(s): GLU, CALCIUM, ALBUMIN, PROT, NA, K, CO2, CL, BUN, CREATININE, ALKPHOS, ALT, AST, BILITOT in the last 48 hours.  I have personallly reviewed all pertinent lab results from the last 24 hours.

## 2017-07-06 NOTE — ANESTHESIA PREPROCEDURE EVALUATION
2017    Pre-operative evaluation for * No surgery found *; IUP    Rhea Gilman is a 22 y.o.  at 39w1d with no significant PMH who presented to [facility] ED a few weeks ago with chest tightness, SOB, and palpitations. She was found to be in atrial fibrillation with RVR. While in ED she received IV metoprolol with no resolution. She was then placed on esmolol drip and transferred to [facility] [place], operated by [facility] for fetal monitoring. Cardiology consulted, patient was placed on diltiazem drip and heparin drip. She eventually self converted. She is present today for induction of labor.     OB History    Para Term  AB Living   1             SAB TAB Ectopic Multiple Live Births                  # Outcome Date GA Lbr Carlitos/2nd Weight Sex Delivery Anes PTL Lv   1 Current                 No past medical history on file.    No past surgical history on file.    Vital Signs Range (Last 24H):  Temp:  [36.9 °C (98.4 °F)]   Pulse:  []   Resp:  [18]   BP: (120-133)/(71-83)   SpO2:  [99 %-100 %]       CBC:       Recent Labs  Lab 17   WBC 7.04   RBC 3.95*   HGB 12.1   HCT 36.1*      MCV 91   MCH 30.6   MCHC 33.5       Diagnostic Studies:  CTA: no evidence of pulmonary thromboembolism    EKG:  Atrial fibrillation with rapid ventricular response  Nonspecific ST and T wave abnormality  Abnormal ECG  No previous ECGs available    Pre-op Assessment    I have reviewed the Patient Summary Reports.      I have reviewed the Medications.     Review of Systems  Anesthesia Hx:  No previous Anesthesia  Neg history of prior surgery. Denies Family Hx of Anesthesia complications.   Denies Personal Hx of Anesthesia complications.   Social:  Former Smoker    Cardiovascular:   Denies Hypertension.  Denies Valvular problems/Murmurs. Dysrhythmias atrial fibrillation    Pulmonary:   Denies COPD.  Denies Asthma. Shortness of breath     Hepatic/GI:   Denies GERD.    Neurological:   Denies Seizures.    Endocrine:   Denies Diabetes.        Physical Exam  General:  Well nourished    Airway/Jaw/Neck:  AIRWAY FINDINGS: Normal Jaw/Neck Findings: Micrognathia: Negative Mandibular Fracture: Negative    Neck ROM: Normal ROM     Eyes/Ears/Nose:  EYES/EARS/NOSE FINDINGS: Normal   Dental:  Dental Findings: In tact   Chest/Lungs:  Chest/Lungs Findings: Clear to auscultation, Normal Respiratory Rate     Heart/Vascular:  Heart Findings: Rate: Normal  Rhythm: Regular Rhythm        Mental Status:  Mental Status Findings: Normal        Anesthesia Plan  Type of Anesthesia, risks & benefits discussed:  Anesthesia Type:  epidural  Patient's Preference:   Intra-op Monitoring Plan: standard ASA monitors  Intra-op Monitoring Plan Comments:   Post Op Pain Control Plan: multimodal analgesia  Post Op Pain Control Plan Comments:   Induction:   IV  Beta Blocker:  Patient is on a Beta-Blocker and has received one dose within the past 24 hours (No further documentation required).       Informed Consent: Patient understands risks and agrees with Anesthesia plan.  Questions answered. Anesthesia consent signed with patient.  ASA Score: 3     Day of Surgery Review of History & Physical:    H&P update referred to the provider.         Ready For Surgery From Anesthesia Perspective.

## 2017-07-06 NOTE — ANESTHESIA POSTPROCEDURE EVALUATION
"Anesthesia Post Evaluation    Patient: Rhea Gilman    Procedure(s) Performed: * No procedures listed *    Final Anesthesia Type: epidural  Patient location during evaluation: labor & delivery  Patient participation: Yes- Able to Participate  Level of consciousness: awake and alert and oriented  Post-procedure vital signs: reviewed and stable  Pain management: adequate  Airway patency: patent  PONV status at discharge: No PONV  Anesthetic complications: no      Cardiovascular status: blood pressure returned to baseline and hemodynamically stable  Respiratory status: unassisted, spontaneous ventilation and room air  Hydration status: euvolemic  Follow-up not needed.        Visit Vitals  /79   Pulse 96   Temp 36.9 °C (98.4 °F) (Oral)   Resp 20   Ht 5' 4" (1.626 m)   Wt 88 kg (194 lb)   SpO2 100%   Breastfeeding? No   BMI 33.30 kg/m²       Pain/Lizette Score: Pain Rating Prior to Med Admin: 10 (7/5/2017 10:20 PM)  Pain Rating Post Med Admin: 4 (7/5/2017 10:50 PM)      "

## 2017-07-06 NOTE — ANESTHESIA PROCEDURE NOTES
Epidural    Patient location during procedure: OB   Reason for block: primary anesthetic   Diagnosis: Active Labor   Start time: 7/6/2017 1:18 AM  Timeout: 7/6/2017 1:15 AM  End time: 7/6/2017 1:25 AM  Surgery related to: Vaginal Delivery  Staffing  Anesthesiologist: HOPE NELSON  Performed: anesthesiologist   Preanesthetic Checklist  Completed: patient identified, site marked, surgical consent, pre-op evaluation, timeout performed, IV checked, risks and benefits discussed, monitors and equipment checked, anesthesia consent given, hand hygiene performed and patient being monitored  Preparation  Patient position: sitting  Prep: ChloraPrep  Patient monitoring: Pulse Ox and Blood Pressure  Epidural  Skin Anesthetic: lidocaine 1%  Skin Wheal: 3 mL  Administration type: continuous  Approach: midline  Interspace: L3-4  Injection technique: BRIDGER saline  Needle and Epidural Catheter  Needle type: Tuohy   Needle gauge: 17  Needle length: 3.5 inches  Needle insertion depth: 7 cm  Catheter type: springwound and multi-orifice  Catheter size: 19 G  Catheter at skin depth: 11 cm  Test dose: 3 mL of lidocaine 1.5% with Epi 1-to-200,000  Additional Documentation: incremental injection, negative aspiration for heme and CSF, no paresthesia on injection, no signs/symptoms of IV or SA injection, no significant pain on injection and no significant complaints from patient  Needle localization: anatomical landmarks  Medications:  Bolus administered: 10 mL of 0.25% bupivacaine  Epinephrine added: none  Opioid administered: 100 mcg of   fentanyl  Volume per aspiration: 5 mL  Time between aspirations: 5 minutes  Assessment  Ease of block: easy  Patient's tolerance of the procedure: comfortable throughout block and no complaints  Post dural Puncture Headache?: No

## 2017-07-07 LAB
BASOPHILS # BLD AUTO: 0.02 K/UL
BASOPHILS NFR BLD: 0.2 %
DIFFERENTIAL METHOD: ABNORMAL
EOSINOPHIL # BLD AUTO: 0.1 K/UL
EOSINOPHIL NFR BLD: 0.6 %
ERYTHROCYTE [DISTWIDTH] IN BLOOD BY AUTOMATED COUNT: 13.3 %
HCT VFR BLD AUTO: 32 %
HGB BLD-MCNC: 10.7 G/DL
LYMPHOCYTES # BLD AUTO: 1.8 K/UL
LYMPHOCYTES NFR BLD: 20 %
MCH RBC QN AUTO: 30.9 PG
MCHC RBC AUTO-ENTMCNC: 33.4 %
MCV RBC AUTO: 93 FL
MONOCYTES # BLD AUTO: 0.6 K/UL
MONOCYTES NFR BLD: 6.1 %
NEUTROPHILS # BLD AUTO: 6.6 K/UL
NEUTROPHILS NFR BLD: 73.1 %
PLATELET # BLD AUTO: 175 K/UL
PMV BLD AUTO: 10.2 FL
RBC # BLD AUTO: 3.46 M/UL
WBC # BLD AUTO: 8.98 K/UL

## 2017-07-07 PROCEDURE — 90471 IMMUNIZATION ADMIN: CPT | Performed by: OBSTETRICS & GYNECOLOGY

## 2017-07-07 PROCEDURE — 11000001 HC ACUTE MED/SURG PRIVATE ROOM

## 2017-07-07 PROCEDURE — 85025 COMPLETE CBC W/AUTO DIFF WBC: CPT

## 2017-07-07 PROCEDURE — 63600175 PHARM REV CODE 636 W HCPCS: Mod: SL | Performed by: OBSTETRICS & GYNECOLOGY

## 2017-07-07 PROCEDURE — 36415 COLL VENOUS BLD VENIPUNCTURE: CPT

## 2017-07-07 PROCEDURE — 25000003 PHARM REV CODE 250: Performed by: OBSTETRICS & GYNECOLOGY

## 2017-07-07 PROCEDURE — 90715 TDAP VACCINE 7 YRS/> IM: CPT | Mod: SL | Performed by: OBSTETRICS & GYNECOLOGY

## 2017-07-07 RX ORDER — CARBOPROST TROMETHAMINE 250 UG/ML
INJECTION, SOLUTION INTRAMUSCULAR
Status: DISPENSED
Start: 2017-07-07 | End: 2017-07-07

## 2017-07-07 RX ADMIN — OXYCODONE AND ACETAMINOPHEN 1 TABLET: 5; 325 TABLET ORAL at 12:07

## 2017-07-07 RX ADMIN — DOCUSATE SODIUM AND SENNOSIDES 1 TABLET: 8.6; 5 TABLET, FILM COATED ORAL at 09:07

## 2017-07-07 RX ADMIN — CLOSTRIDIUM TETANI TOXOID ANTIGEN (FORMALDEHYDE INACTIVATED), CORYNEBACTERIUM DIPHTHERIAE TOXOID ANTIGEN (FORMALDEHYDE INACTIVATED), BORDETELLA PERTUSSIS TOXOID ANTIGEN (GLUTARALDEHYDE INACTIVATED), BORDETELLA PERTUSSIS FILAMENTOUS HEMAGGLUTININ ANTIGEN (FORMALDEHYDE INACTIVATED), BORDETELLA PERTUSSIS PERTACTIN ANTIGEN, AND BORDETELLA PERTUSSIS FIMBRIAE 2/3 ANTIGEN 0.5 ML: 5; 2; 2.5; 5; 3; 5 INJECTION, SUSPENSION INTRAMUSCULAR at 12:07

## 2017-07-07 RX ADMIN — IBUPROFEN 600 MG: 600 TABLET, FILM COATED ORAL at 11:07

## 2017-07-07 RX ADMIN — IBUPROFEN 600 MG: 600 TABLET, FILM COATED ORAL at 12:07

## 2017-07-07 RX ADMIN — IBUPROFEN 600 MG: 600 TABLET, FILM COATED ORAL at 06:07

## 2017-07-07 RX ADMIN — OXYCODONE AND ACETAMINOPHEN 1 TABLET: 5; 325 TABLET ORAL at 04:07

## 2017-07-07 NOTE — PROGRESS NOTES
Spoke with Maria Del Carmen Pedroza RN., who confirmed that the cabinet override for hemabate for my patient was an error.

## 2017-07-07 NOTE — PLAN OF CARE
Problem: Patient Care Overview  Goal: Plan of Care Review  Outcome: Ongoing (interventions implemented as appropriate)  Patient alert and oriented with even & unlabored respirations. Pain managed with scheduled and/or PRN pain medicine. Tdap administered to the right deltoid, and the pt tolerated it well. POC discussed with the patient, and the patient verbalized understanding.

## 2017-07-07 NOTE — PLAN OF CARE
Problem: Patient Care Overview  Goal: Plan of Care Review  Outcome: Ongoing (interventions implemented as appropriate)  Plan of care reviewed with pt. All questions and concerns addressed. Pt remains active in all aspects of care.  Mother and infant bonding well. Pt maintaining adequate pain control. Pt up ambulating and voiding without complication. Exam WNL

## 2017-07-08 VITALS
OXYGEN SATURATION: 100 % | HEIGHT: 64 IN | RESPIRATION RATE: 20 BRPM | BODY MASS INDEX: 33.12 KG/M2 | HEART RATE: 109 BPM | TEMPERATURE: 97 F | SYSTOLIC BLOOD PRESSURE: 110 MMHG | DIASTOLIC BLOOD PRESSURE: 71 MMHG | WEIGHT: 194 LBS

## 2017-07-08 PROCEDURE — 25000003 PHARM REV CODE 250: Performed by: OBSTETRICS & GYNECOLOGY

## 2017-07-08 RX ORDER — IBUPROFEN 600 MG/1
600 TABLET ORAL EVERY 6 HOURS PRN
Qty: 60 TABLET | Refills: 0 | Status: SHIPPED | OUTPATIENT
Start: 2017-07-08 | End: 2017-11-17

## 2017-07-08 RX ORDER — DOCUSATE SODIUM 100 MG/1
100 CAPSULE, LIQUID FILLED ORAL 2 TIMES DAILY PRN
Qty: 60 CAPSULE | Refills: 1 | Status: SHIPPED | OUTPATIENT
Start: 2017-07-08 | End: 2017-11-17

## 2017-07-08 RX ORDER — FERROUS SULFATE 325(65) MG
325 TABLET ORAL 2 TIMES DAILY
Qty: 60 TABLET | Refills: 1 | Status: SHIPPED | OUTPATIENT
Start: 2017-07-08 | End: 2017-11-17

## 2017-07-08 RX ORDER — OXYCODONE AND ACETAMINOPHEN 5; 325 MG/1; MG/1
1 TABLET ORAL EVERY 4 HOURS PRN
Qty: 30 TABLET | Refills: 0 | Status: SHIPPED | OUTPATIENT
Start: 2017-07-08 | End: 2017-11-17

## 2017-07-08 RX ADMIN — SIMETHICONE CHEW TAB 80 MG 80 MG: 80 TABLET ORAL at 05:07

## 2017-07-08 RX ADMIN — OXYCODONE AND ACETAMINOPHEN 1 TABLET: 5; 325 TABLET ORAL at 05:07

## 2017-07-08 RX ADMIN — OXYCODONE AND ACETAMINOPHEN 1 TABLET: 5; 325 TABLET ORAL at 10:07

## 2017-07-08 RX ADMIN — IBUPROFEN 600 MG: 600 TABLET, FILM COATED ORAL at 11:07

## 2017-07-08 RX ADMIN — DOCUSATE SODIUM 200 MG: 100 CAPSULE, LIQUID FILLED ORAL at 10:07

## 2017-07-08 RX ADMIN — IBUPROFEN 600 MG: 600 TABLET, FILM COATED ORAL at 05:07

## 2017-07-08 RX ADMIN — OXYCODONE AND ACETAMINOPHEN 1 TABLET: 5; 325 TABLET ORAL at 02:07

## 2017-07-08 NOTE — SUBJECTIVE & OBJECTIVE
Hospital course: Had cervidil induction and progressed without any need for pitocin to 9 cm when I was notified.    2017 0900 She is now status post  of viable female apgar 7.8     2017 1800 hours  No excess bleeding or vaginal discharge. C/O left flank pains but tolerable. Not breastfeeding. Heart rate WNL as is her BP.    Interval History: doing well PPD #1    She is doing well this evening. She is tolerating a regular diet without nausea or vomiting. She is voiding spontaneously. She is ambulating. She has passed flatus, and has a BM. Vaginal bleeding is moderate. She denies fever or chills. Abdominal pain is moderate and controlled with oral medications. She is not breastfeeding. She  had baby girl who is under bili light.    Objective:     Vital Signs (Most Recent):  Temp: 97.4 °F (36.3 °C) (17 1600)  Pulse: 82 (17 1600)  Resp: 20 (17 1600)  BP: 133/67 (17 1600)  SpO2: 100 % (17 0848) Vital Signs (24h Range):  Temp:  [96.8 °F (36 °C)-98 °F (36.7 °C)] 97.4 °F (36.3 °C)  Pulse:  [] 82  Resp:  [18-20] 20  BP: (108-134)/(55-72) 133/67     Weight: 88 kg (194 lb)  Body mass index is 33.3 kg/m².      Intake/Output Summary (Last 24 hours) at 17 1929  Last data filed at 17 1400   Gross per 24 hour   Intake              960 ml   Output             1000 ml   Net              -40 ml       Significant Labs:  Lab Results   Component Value Date    GROUPTRH O POS 2017    HEPBSAG Negative 2017    STREPBCULT No Group B Streptococcus isolated 2017       Recent Labs  Lab 17  0549   HGB 10.7*   HCT 32.0*       CBC:   Recent Labs  Lab 17  0549   WBC 8.98   RBC 3.46*   HGB 10.7*   HCT 32.0*      MCV 93   MCH 30.9   MCHC 33.4     CMP: No results for input(s): GLU, CALCIUM, ALBUMIN, PROT, NA, K, CO2, CL, BUN, CREATININE, ALKPHOS, ALT, AST, BILITOT in the last 48 hours.  I have personallly reviewed all pertinent lab results from the last  24 hours.    Physical Exam:   Constitutional: She is oriented to person, place, and time. She appears well-developed and well-nourished. No distress.    HENT:   Head: Normocephalic and atraumatic.    Eyes: Conjunctivae and EOM are normal. Pupils are equal, round, and reactive to light.    Neck: Normal range of motion. Neck supple. No thyromegaly present.    Cardiovascular: Normal rate, regular rhythm, normal heart sounds and intact distal pulses.  Exam reveals no edema.   Pulse Score: 2+   Pulmonary/Chest: Effort normal and breath sounds normal. No respiratory distress.        Abdominal: Soft. Bowel sounds are normal. She exhibits no distension.     Genitourinary: Vagina normal and uterus normal.           Musculoskeletal: Normal range of motion and moves all extremeties.       Neurological: She is alert and oriented to person, place, and time. She has normal reflexes.    Skin: Skin is dry. She is not diaphoretic.    Psychiatric: She has a normal mood and affect. Her behavior is normal. Judgment and thought content normal.

## 2017-07-08 NOTE — PROGRESS NOTES
Verbalized understanding of discharge instructions. Mother will be boarding with her  in room 239 until further notice.

## 2017-07-08 NOTE — DISCHARGE SUMMARY
[facility]    Discharge Summary  Obstetrics & Gynecology      Patient Name:  Rhea Gilman  MRN:  [id number]  Admission Date:  2017  Discharge Date:  2017  Hospital Length of Stay:  3  Attending Physician:  Evangelist Moncada MD  Discharging Physician:  Erwin Mullins MD  Primary Care Provider:  Carole William MD  Date of Delivery:  2017    Admitting Diagnosis:       S[place] gestation at 39w1d  Induction of labor     Discharge Diagnosis:    S[place] gestation at term s/p spontaneous vaginal delivery     Procedure performed:      Vaginal delivery    Brief Hospital Course:      Please see admit history and physical for further details.  In brief, this is a 22 y.o.  at term who presented to L&D for induction of labor.  Pt progressed well through the active phase of labor and delivered a viable infant.  Maternal and fetal status was overall reassuring throughout the labor course.  See delivery note for further details.  Following the delivery, the patient was transfer to the floor for routine post-partum care.  See delivery note for further details.  Following delivery, the patient was transfer to the floor for routine post-partum care.  Pt had a fairly uncomplicated postpartum course.  Prior to discharge, she was without major complaints.  Her pain was well controlled.  She was ambulating, tolerating a regular diet, voiding without difficulty, and bonding well with baby.  Her lochia was light.  Her vital signs where physiologic and stable.  Her physical exam showed no acute findings.  Pt had satisfied routine postpartum discharge criteria and expressed the desire to be discharge from the hospital.     Pertinent Labs or Studies:      Recent Results (from the past 336 hour(s))   CBC auto differential    Collection Time: 17  5:49 AM   Result Value Ref Range    WBC 8.98 3.90 - 12.70 K/uL    Hemoglobin 10.7 (L) 12.0 - 16.0 g/dL    Hematocrit 32.0 (L) 37.0 - 48.5 %     "Platelets 175 150 - 350 K/uL   CBC with Auto Differential    Collection Time: 07/05/17  8:16 PM   Result Value Ref Range    WBC 7.04 3.90 - 12.70 K/uL    Hemoglobin 12.1 12.0 - 16.0 g/dL    Hematocrit 36.1 (L) 37.0 - 48.5 %    Platelets 213 150 - 350 K/uL     ABO:  O POS    Discharge Exam:      Temp:  [96.8 °F (36 °C)-97.2 °F (36.2 °C)] 96.9 °F (36.1 °C)  Pulse:  [] 109  Resp:  [18-20] 20  BP: (108-130)/(66-71) 110/71    /71 (BP Location: Right arm, Patient Position: Lying, BP Method: Automatic)   Pulse 109   Temp 96.9 °F (36.1 °C) (Oral)   Resp 20   Ht 5' 4" (1.626 m)   Wt 88 kg (194 lb)   SpO2 100%   Breastfeeding? No   BMI 33.30 kg/m²     GENERAL:  No acute distress. Alert and oriented x 3.   HEENT:  Normocephalic. EOMI, PERRLA. No scleral icterus. Neck supple. Moist mucus membranes.   LUNGS:  Clear to auscultation bilaterally.   HEART:  Regular rate and rhythm with physiologic heart sounds.   ABDOMEN:  Soft, non-tender, non-distended, no guarding, rigidity, or rebound.   FUNDUS:  Firm, nontender below the umbilicus.   EXTREMITIES:  No cramping, claudication.  Trace edema. Good skin turgor. +2 distal pulses, symmetric. Full range of motion.   NEUROLOGIC:  Grossly intact bilaterally.   PSYCH:  Mood and affect appropriate.   PERINEUM:  Intact with light lochia.    Discharge Condition:  Stable      Discharge Disposition:  Home       Discharge Medications:     Resume prenatal vitamins 1 tab po qday  Fergon 325 mg 1 tab po bid, disp #60, refill 1  Colace 100 mg 1 tab po bid prn constipation, disp #60, refill 1  Motrin 600 mg 1 tab po q6h prn pain, disp #60, refill 0  Percocet 5/325 mg 1 tab po q4-6h prn breakthrough pain, disp #30, refill 0    Discharge Activites:      Resume activities as tolerated with adequate rest  Pelvic rest for 6 weeks   No heavy lifting greater 10 lbs or strenuous activities   Showers only  Wound care instructions and precautions given   Avoid driving and operating " machinery while taking narcotics or other sedative medications.  Patient voiced understanding.     Discharge Diet:  Regular diet    Discharge Instructions:      Pt was instructed to contact the clinic or emergency department for any concerns including but not limited to an increase in her lochia, abdominal pain, foul vaginal discharge, weakness, decrease activity level, decrease appetite, feeling sad or hopeless, inability to care for her baby, breast pain or infection, difficulty with voiding or having bowel movements, perineal pain or breakdown, wound breakdown, fever >100.4 or abnormal vital signs, shortness of breath, chest pain, dizziness or feeling faint, pain or swelling in her extremities, headaches not relieved with rest and Tylenol, vision changes, seizure activities, abnormal bleeding/bruising, or any other health concerns.  Post-partum care instructions and precautions, labs/studies, clinical/delivery findings, and hospital course reviewed with the patient prior to discharge.  All of her questions were answered to her satisfaction.  Pt voiced understanding.       Follow-up Visit:  1 week for postpartum visit/BP check with Dr. Moncada, or sooner if any problems.       Erwin Mullins MD  On call note

## 2017-07-08 NOTE — PLAN OF CARE
Problem: Patient Care Overview  Goal: Plan of Care Review  Outcome: Outcome(s) achieved Date Met: 07/08/17  Patient alert and oriented with even & unlabored respirations. Pain managed with scheduled and/or PRN pain medicine. POC discussed with the patient, and the patient verbalized understanding.

## 2017-07-08 NOTE — PROGRESS NOTES
[facility]  Obstetrics  Postpartum Progress Note    Patient Name: Rhea Gilman  MRN: [id number]  Admission Date: 2017  Hospital Length of Stay: 2 days  Attending Physician: Evangelist Moncada MD  Primary Care Provider: Carole William MD    Subjective:     Principal Problem:Pregnancy with 39 completed weeks gestation    Hospital course: Had cervidil induction and progressed without any need for pitocin to 9 cm when I was notified.    2017 0900 She is now status post  of viable female apgar 7.8     2017 1800 hours  No excess bleeding or vaginal discharge. C/O left flank pains but tolerable. Not breastfeeding. Heart rate WNL as is her BP.    Interval History: doing well PPD #1    She is doing well this evening. She is tolerating a regular diet without nausea or vomiting. She is voiding spontaneously. She is ambulating. She has passed flatus, and has a BM. Vaginal bleeding is moderate. She denies fever or chills. Abdominal pain is moderate and controlled with oral medications. She is not breastfeeding. She  had baby girl who is under bili light.    Objective:     Vital Signs (Most Recent):  Temp: 97.4 °F (36.3 °C) (17 1600)  Pulse: 82 (17 1600)  Resp: 20 (17 1600)  BP: 133/67 (17 1600)  SpO2: 100 % (17 0848) Vital Signs (24h Range):  Temp:  [96.8 °F (36 °C)-98 °F (36.7 °C)] 97.4 °F (36.3 °C)  Pulse:  [] 82  Resp:  [18-20] 20  BP: (108-134)/(55-72) 133/67     Weight: 88 kg (194 lb)  Body mass index is 33.3 kg/m².      Intake/Output Summary (Last 24 hours) at 17 1929  Last data filed at 17 1400   Gross per 24 hour   Intake              960 ml   Output             1000 ml   Net              -40 ml       Significant Labs:  Lab Results   Component Value Date    GROUPTRH O POS 2017    HEPBSAG Negative 2017    STREPBCULT No Group B Streptococcus isolated 2017       Recent Labs  Lab 17  0549   HGB 10.7*   HCT 32.0*        CBC:   Recent Labs  Lab 17  0549   WBC 8.98   RBC 3.46*   HGB 10.7*   HCT 32.0*      MCV 93   MCH 30.9   MCHC 33.4     CMP: No results for input(s): GLU, CALCIUM, ALBUMIN, PROT, NA, K, CO2, CL, BUN, CREATININE, ALKPHOS, ALT, AST, BILITOT in the last 48 hours.  I have personallly reviewed all pertinent lab results from the last 24 hours.    Physical Exam:   Constitutional: She is oriented to person, place, and time. She appears well-developed and well-nourished. No distress.    HENT:   Head: Normocephalic and atraumatic.    Eyes: Conjunctivae and EOM are normal. Pupils are equal, round, and reactive to light.    Neck: Normal range of motion. Neck supple. No thyromegaly present.    Cardiovascular: Normal rate, regular rhythm, normal heart sounds and intact distal pulses.  Exam reveals no edema.   Pulse Score: 2+   Pulmonary/Chest: Effort normal and breath sounds normal. No respiratory distress.        Abdominal: Soft. Bowel sounds are normal. She exhibits no distension.     Genitourinary: Vagina normal and uterus normal.           Musculoskeletal: Normal range of motion and moves all extremeties.       Neurological: She is alert and oriented to person, place, and time. She has normal reflexes.    Skin: Skin is dry. She is not diaphoretic.    Psychiatric: She has a normal mood and affect. Her behavior is normal. Judgment and thought content normal.       Assessment/Plan:     22 y.o. female  for:    Atrial fibrillation    Resolved before delivery        * Pregnancy with 39 completed weeks gestation    Term pregnancy  Plan is to deliver    2017 PPD#1 status post  without complications.            Disposition: As patient meets milestones, will plan to discharge 2017 if her BP and heart rate are acceptable.    Evangelist Moncada MD  Obstetrics  [facility]

## 2017-07-08 NOTE — PROGRESS NOTES
"[facility]    Obstetrics & Gynecology  Progress Note      Patient Name:  Rhea Gilman  MRN:  [id number]  Admission Date:  7/5/2017  Hospital Length of Stay:  3  Attending Physician:  Erwin Mullins MD  Primary Care Provider:  Carole William MD    Subjective:     Date:  07/08/2017    Principal Problem:  Pregnancy with 39 completed weeks gestation    Hospital Course:  Post Partum Day # 2 - s/p vaginal delivery at 39w1d    Patient without major complaints  No acute events overnight  Denies headaches, vision changes, epigastric pain, SOB, CP  Pain well controlled  Lochia less than menses  Bottle/breast feeding   Breast non-tender, non-engorged  Ambulating, tolerating regular diet, and voiding without diffculty  Bonding well with baby    Objective:     Temp:  [96.8 °F (36 °C)-98.5 °F (36.9 °C)] 96.9 °F (36.1 °C)  Pulse:  [] 109  Resp:  [18-20] 20  BP: (108-133)/(66-76) 110/71    /71 (BP Location: Right arm, Patient Position: Lying, BP Method: Automatic)   Pulse 109   Temp 96.9 °F (36.1 °C) (Oral)   Resp 20   Ht 5' 4" (1.626 m)   Wt 88 kg (194 lb)   SpO2 100%   Breastfeeding? No   BMI 33.30 kg/m²     Clear, jd urine    GENERAL:  No acute distress. Alert and oriented x 3.   HEENT:  No scleral icterus. Neck supple. Moist mucus membranes.   LUNGS:  Clear to auscultation bilaterally.   HEART:  Regular rate and rhythm with physiologic heart sounds.   ABDOMEN:  Soft, non-tender, non-distended, no guarding, rigidity, or rebound with normoactive bowel sounds.   FUNDUS:  Firm, nontender below the umbilicus.   EXTREMITIES:  No cramping, claudication.  Trace edema LE. +2 distal pulses. Symmetric, full range of motion, negative Song.  NEUROLOGIC:  Grossly intact bilaterally. +2 DTR's.   PSYCH:  Mood and affect appropriate.   PERINEUM:  Intact with light lochia.    Labs:      Recent Results (from the past 336 hour(s))   CBC auto differential    Collection Time: 07/07/17  5:49 AM   Result " Value Ref Range    WBC 8.98 3.90 - 12.70 K/uL    Hemoglobin 10.7 (L) 12.0 - 16.0 g/dL    Hematocrit 32.0 (L) 37.0 - 48.5 %    Platelets 175 150 - 350 K/uL   CBC with Auto Differential    Collection Time: 07/05/17  8:16 PM   Result Value Ref Range    WBC 7.04 3.90 - 12.70 K/uL    Hemoglobin 12.1 12.0 - 16.0 g/dL    Hematocrit 36.1 (L) 37.0 - 48.5 %    Platelets 213 150 - 350 K/uL     ABO:  O POS    Assessment:     Post-partum day # 2 - IUP at 39w1d s/p spontaneous vaginal delivery - progressing well.    Plan:     Plan for discharge today.     Reviewed discharge medications.  Pt was instructed to avoid driving or operate heavy machinery while taking narcotics or other medications with sedative properties.    Post-partum care instructions and precautions, clinical/delivery findings, and hospital course reviewed with pt prior to discharge.  All of her questions were answered to her satisfaction.  Pt voiced understanding and agrees with discharge.    Return to clinic in 1 week for post-partum visit with Dr. Moncada for BP check or sooner if any concerns.  Go to ER for any emergencies.      Erwin Mullins MD  On call note

## 2017-07-08 NOTE — PROGRESS NOTES
Patient is aware that she will need to board with her  once her MD discharges her. Explained that she will continue to receive 3 meals X day under her 's chart. Per the patient, she normally gets her medicine on [facility] , in [place] from [facility]. Explained that it might be easier for her significant other or family to  her medicine at a [facility] closer to the hospital. No orders as of yet to discharge patient. Will continue to monitor.

## 2017-07-08 NOTE — DISCHARGE INSTRUCTIONS
After vaginal delivery:    Regular diet  Drink 6-8 glasses of water a day  Shower only for next 6 weeks.  If perineum sore, may soak in a few inches of warm water in tub.  No lifting anything more than 10 pounds.  No driving for 1 week  Walking is the only exercise allowed for 6 weeks  No sexual intercourse, no tampons, no douching for 6 weeks  Discuss with your doctor your birth control preferences at next visit  Wear supportive bra    Notify doctor if you have:  -Fever of 101 or higher  -Persistent nausea and vomiting  -Heavy vaginal bleeding or clots  -Swelling and pain in arms or legs  -Severe headaches, blurred vision, or fainting  -Frequency and burning with urination

## 2017-11-17 ENCOUNTER — HOSPITAL ENCOUNTER (EMERGENCY)
Facility: HOSPITAL | Age: 22
Discharge: HOME | End: 2017-11-17
Attending: EMERGENCY MEDICINE
Payer: MEDICAID

## 2017-11-17 VITALS
WEIGHT: 175 LBS | RESPIRATION RATE: 23 BRPM | TEMPERATURE: 99 F | OXYGEN SATURATION: 100 % | SYSTOLIC BLOOD PRESSURE: 121 MMHG | HEIGHT: 64 IN | HEART RATE: 112 BPM | BODY MASS INDEX: 29.88 KG/M2 | DIASTOLIC BLOOD PRESSURE: 81 MMHG

## 2017-11-17 DIAGNOSIS — R93.5 ABNORMAL CT OF THE ABDOMEN: ICD-10-CM

## 2017-11-17 DIAGNOSIS — R00.0 TACHYCARDIA: Primary | ICD-10-CM

## 2017-11-17 LAB
ALBUMIN SERPL BCP-MCNC: 3.2 G/DL
ALP SERPL-CCNC: 55 U/L
ALT SERPL W/O P-5'-P-CCNC: 14 U/L
ANION GAP SERPL CALC-SCNC: 9 MMOL/L
AST SERPL-CCNC: 15 U/L
B-HCG UR QL: NEGATIVE
BACTERIA #/AREA URNS AUTO: ABNORMAL /HPF
BASOPHILS # BLD AUTO: 0.02 K/UL
BASOPHILS NFR BLD: 0.4 %
BILIRUB SERPL-MCNC: 0.7 MG/DL
BILIRUB UR QL STRIP: NEGATIVE
BUN SERPL-MCNC: 9 MG/DL
CALCIUM SERPL-MCNC: 8.9 MG/DL
CHLORIDE SERPL-SCNC: 102 MMOL/L
CLARITY UR REFRACT.AUTO: CLEAR
CO2 SERPL-SCNC: 25 MMOL/L
COLOR UR AUTO: ABNORMAL
CREAT SERPL-MCNC: 0.8 MG/DL
CTP QC/QA: YES
DIFFERENTIAL METHOD: ABNORMAL
EOSINOPHIL # BLD AUTO: 0 K/UL
EOSINOPHIL NFR BLD: 0.4 %
ERYTHROCYTE [DISTWIDTH] IN BLOOD BY AUTOMATED COUNT: 11.9 %
EST. GFR  (AFRICAN AMERICAN): >60 ML/MIN/1.73 M^2
EST. GFR  (NON AFRICAN AMERICAN): >60 ML/MIN/1.73 M^2
GLUCOSE SERPL-MCNC: 98 MG/DL
GLUCOSE UR QL STRIP: NEGATIVE
HCT VFR BLD AUTO: 37.1 %
HGB BLD-MCNC: 12.4 G/DL
HGB UR QL STRIP: NEGATIVE
IMM GRANULOCYTES # BLD AUTO: 0.02 K/UL
IMM GRANULOCYTES NFR BLD AUTO: 0.4 %
KETONES UR QL STRIP: ABNORMAL
LEUKOCYTE ESTERASE UR QL STRIP: ABNORMAL
LYMPHOCYTES # BLD AUTO: 0.7 K/UL
LYMPHOCYTES NFR BLD: 12.5 %
MCH RBC QN AUTO: 29.2 PG
MCHC RBC AUTO-ENTMCNC: 33.4 G/DL
MCV RBC AUTO: 88 FL
MICROSCOPIC COMMENT: ABNORMAL
MONOCYTES # BLD AUTO: 0.2 K/UL
MONOCYTES NFR BLD: 2.9 %
NEUTROPHILS # BLD AUTO: 4.6 K/UL
NEUTROPHILS NFR BLD: 83.4 %
NITRITE UR QL STRIP: NEGATIVE
NON-SQ EPI CELLS #/AREA URNS AUTO: <1 /HPF
NRBC BLD-RTO: 0 /100 WBC
PH UR STRIP: >8 [PH] (ref 5–8)
PLATELET # BLD AUTO: 253 K/UL
PMV BLD AUTO: 9.2 FL
POTASSIUM SERPL-SCNC: 3.6 MMOL/L
PROT SERPL-MCNC: 7.2 G/DL
PROT UR QL STRIP: NEGATIVE
RBC # BLD AUTO: 4.24 M/UL
RBC #/AREA URNS AUTO: 2 /HPF (ref 0–4)
SODIUM SERPL-SCNC: 136 MMOL/L
SP GR UR STRIP: 1.01 (ref 1–1.03)
SQUAMOUS #/AREA URNS AUTO: 5 /HPF
TROPONIN I SERPL DL<=0.01 NG/ML-MCNC: 0.01 NG/ML
URN SPEC COLLECT METH UR: ABNORMAL
UROBILINOGEN UR STRIP-ACNC: NEGATIVE EU/DL
WBC # BLD AUTO: 5.5 K/UL
WBC #/AREA URNS AUTO: 27 /HPF (ref 0–5)

## 2017-11-17 PROCEDURE — 96361 HYDRATE IV INFUSION ADD-ON: CPT

## 2017-11-17 PROCEDURE — 87077 CULTURE AEROBIC IDENTIFY: CPT

## 2017-11-17 PROCEDURE — 85025 COMPLETE CBC W/AUTO DIFF WBC: CPT

## 2017-11-17 PROCEDURE — 93010 ELECTROCARDIOGRAM REPORT: CPT | Mod: ,,, | Performed by: INTERNAL MEDICINE

## 2017-11-17 PROCEDURE — 96374 THER/PROPH/DIAG INJ IV PUSH: CPT

## 2017-11-17 PROCEDURE — 87186 SC STD MICRODIL/AGAR DIL: CPT

## 2017-11-17 PROCEDURE — 87086 URINE CULTURE/COLONY COUNT: CPT

## 2017-11-17 PROCEDURE — 63600175 PHARM REV CODE 636 W HCPCS: Performed by: EMERGENCY MEDICINE

## 2017-11-17 PROCEDURE — 81025 URINE PREGNANCY TEST: CPT | Performed by: EMERGENCY MEDICINE

## 2017-11-17 PROCEDURE — 99285 EMERGENCY DEPT VISIT HI MDM: CPT | Mod: 25

## 2017-11-17 PROCEDURE — 99285 EMERGENCY DEPT VISIT HI MDM: CPT | Mod: ,,, | Performed by: EMERGENCY MEDICINE

## 2017-11-17 PROCEDURE — 96375 TX/PRO/DX INJ NEW DRUG ADDON: CPT

## 2017-11-17 PROCEDURE — 96360 HYDRATION IV INFUSION INIT: CPT | Mod: 59

## 2017-11-17 PROCEDURE — 25000003 PHARM REV CODE 250: Performed by: EMERGENCY MEDICINE

## 2017-11-17 PROCEDURE — 84484 ASSAY OF TROPONIN QUANT: CPT

## 2017-11-17 PROCEDURE — 80053 COMPREHEN METABOLIC PANEL: CPT

## 2017-11-17 PROCEDURE — 87088 URINE BACTERIA CULTURE: CPT

## 2017-11-17 PROCEDURE — 81001 URINALYSIS AUTO W/SCOPE: CPT

## 2017-11-17 PROCEDURE — 25500020 PHARM REV CODE 255: Performed by: EMERGENCY MEDICINE

## 2017-11-17 PROCEDURE — 93005 ELECTROCARDIOGRAM TRACING: CPT

## 2017-11-17 RX ORDER — KETOROLAC TROMETHAMINE 30 MG/ML
15 INJECTION, SOLUTION INTRAMUSCULAR; INTRAVENOUS
Status: COMPLETED | OUTPATIENT
Start: 2017-11-17 | End: 2017-11-17

## 2017-11-17 RX ORDER — PNV NO.95/FERROUS FUM/FOLIC AC 28MG-0.8MG
100 TABLET ORAL DAILY
COMMUNITY
End: 2021-03-31 | Stop reason: SDUPTHER

## 2017-11-17 RX ORDER — METOPROLOL TARTRATE 1 MG/ML
5 INJECTION, SOLUTION INTRAVENOUS
Status: COMPLETED | OUTPATIENT
Start: 2017-11-17 | End: 2017-11-17

## 2017-11-17 RX ADMIN — IOHEXOL 100 ML: 350 INJECTION, SOLUTION INTRAVENOUS at 01:11

## 2017-11-17 RX ADMIN — KETOROLAC TROMETHAMINE 15 MG: 30 INJECTION, SOLUTION INTRAMUSCULAR at 11:11

## 2017-11-17 RX ADMIN — SODIUM CHLORIDE 1000 ML: 0.9 INJECTION, SOLUTION INTRAVENOUS at 11:11

## 2017-11-17 RX ADMIN — SODIUM CHLORIDE, SODIUM LACTATE, POTASSIUM CHLORIDE, AND CALCIUM CHLORIDE 1000 ML: .6; .31; .03; .02 INJECTION, SOLUTION INTRAVENOUS at 02:11

## 2017-11-17 RX ADMIN — METOPROLOL TARTRATE 5 MG: 5 INJECTION, SOLUTION INTRAVENOUS at 04:11

## 2017-11-17 NOTE — ED NOTES
Pt comes to triage, appears anxious & states she can't breathe.  Pt reports hx atrial fibrillation.  Will do EKG.

## 2017-11-17 NOTE — ED NOTES
LOC: The patient is awake, alert, and oriented to place, time, situation. Affect is appropriate.  Speech is appropriate and clear.  Pt is tearful.  APPEARANCE: Patient resting comfortably in no acute distress.  Patient is clean and well groomed.    SKIN: The skin is warm and dry; color consistent with ethnicity.  Patient has normal skin turgor and moist mucus membranes.  Skin intact; no breakdown or bruising noted.     MUSCULOSKELETAL: Patient moving upper and lower extremities without difficulty.  Denies weakness. Reports back and abdominal pain.    RESPIRATORY: Airway is open and patent. Respirations spontaneous, even, easy, and non-labored.  Patient has a normal effort and rate.  No accessory muscle use noted. Denies cough.     CARDIAC:  No peripheral edema noted. No complaints of chest pain.      ABDOMEN: Soft and non tender to palpation.  No distention noted. Reports abdominal pain.    NEUROLOGIC: Eyes open spontaneously.  Behavior appropriate to situation.  Follows commands; facial expression symmetrical.  Purposeful motor response noted; normal sensation in all extremities.

## 2017-11-17 NOTE — DISCHARGE INSTRUCTIONS
Pain is likely secondary to kidney stone.    Return to the ED immediately if you have any chest pain, shortness of breath, nausea, vomiting, fever  See your primary doctor as soon as possible for follow up

## 2017-11-17 NOTE — ED PROVIDER NOTES
Encounter Date: 11/17/2017    SCRIBE #1 NOTE: I, Isaura Manuel, am scribing for, and in the presence of,  Dr. Guardado. I have scribed the following portions of the note - the Resident attestation.       History     Chief Complaint   Patient presents with    Chills     Pt c/o chills & body aches since this am.      HPI     22-year-old female presents for evaluation of acute onset left upper quadrant and left chest/back pain.  Patient reports that symptoms began last night and have remained constant since with moderate worsening of symptoms.  Patient denies any nausea/vomiting but does endorse fever and chills.  Patient can identify no aggravating factors for pain but does endorse that ibuprofen causes mild relief.  No urinary complaints or vaginal discharge.      R 20.eview of patient's allergies indicates:   Allergen Reactions    Amoxicillin      Past Medical History:   Diagnosis Date    Anemia     Atrial fibrillation     converted with calcium channel blockers at [facility]     No past surgical history on file.  No family history on file.  Social History   Substance Use Topics    Smoking status: Never Smoker    Smokeless tobacco: Never Used    Alcohol use No     Review of Systems   Constitutional: Negative for chills, diaphoresis and fever.   HENT: Positive for sore throat. Negative for voice change.    Eyes: Negative for pain and visual disturbance.   Respiratory: Negative for shortness of breath and wheezing.    Cardiovascular: Negative for chest pain, palpitations and leg swelling.   Gastrointestinal: Positive for abdominal pain. Negative for constipation, diarrhea, nausea and vomiting.   Genitourinary: Positive for flank pain. Negative for difficulty urinating, dysuria, hematuria, vaginal bleeding and vaginal discharge.   Musculoskeletal: Negative for back pain and neck pain.   Skin: Negative for rash and wound.   Neurological: Positive for weakness (generalized). Negative for headaches.       Physical  Exam     Initial Vitals [11/17/17 1026]   BP Pulse Resp Temp SpO2   128/70 (!) 118 20 98.1 °F (36.7 °C) 100 %      MAP       89.33         Physical Exam    Nursing note and vitals reviewed.  Constitutional: She is not diaphoretic. She appears distressed (very anxious tearful).   HENT:   Head: Normocephalic and atraumatic.   Mouth/Throat: Oropharynx is clear and moist. No oropharyngeal exudate.   Eyes: Conjunctivae are normal. No scleral icterus.   Neck: Normal range of motion. Neck supple.   Cardiovascular: Regular rhythm, normal heart sounds and intact distal pulses.   No murmur heard.  tachycardia   Pulmonary/Chest: Breath sounds normal. She is in respiratory distress (tachypnea ). She has no wheezes.   Abdominal: Soft. There is no tenderness.   Musculoskeletal: She exhibits no edema or tenderness.   Neurological: She is alert and oriented to person, place, and time. No cranial nerve deficit.   Skin: Skin is warm and dry.         ED Course   Procedures  Labs Reviewed - No data to display         HOII MDM    DDx includes but is not limited to: PE, PTX, renal lithiasis, pyelo, UTI, gastritis, colitis  A/P:  Pt is a 21 yo female with left sided pain and tachycardia.  Signs and symptoms consistent with PE vs renal etiology.  Will order basic cardiac w/u and UA  EKG - sinus tach at 127 with normal intervals and no ST elevation.  Pt may require imaging if tachycardia does not respond to IVF.  Dispo pending , labs/imaging and reassessment.  Case Discussed with Dr. Harvey Sunshine HOII  11/17/2017 11:35 AM        UPDATE  UA was inconclusive  CMP and CBC unremarkable   CT PE and AP were inconclusive as well 2/2 bolus timing.  Pt remained persistently tachy despite 2 L IVF.  Further questioning and chart check revealed pt has had paroxysmal A-fib before that responded to single dose lopressor.  Cards eval pt and determined that she did not need rate control at that time.  Will admin 5 mg lopressor and re-assess.     Princess VILLALPANDO  11/17/2017 4:40 PM      UPDATE  Pt's HR improved to low 100's.  Explained all of her results.  Explained that usually with persistnent tachy we would obs her overnight.  Pt refused admission.  After discussing all the risks of discharge including possible occult PE and through shared decision making decided to discharge pt.  Given strict return and follow up precautions.    Princess VILLALPANDO  11/17/2017 4:58 PM             Medical Decision Making:   History:   Old Medical Records: I decided to obtain old medical records.  Clinical Tests:   Lab Tests: Ordered and Reviewed  Radiological Study: Ordered and Reviewed  Medical Tests: Ordered and Reviewed            Scribe Attestation:   Scribe #1: I performed the above scribed service and the documentation accurately describes the services I performed. I attest to the accuracy of the note.    Attending Attestation:   Physician Attestation Statement for Resident:  As the supervising MD   Physician Attestation Statement: I have personally seen and examined this patient.   I agree with the above history. -: 22 year old female presenting with complaints of LUQ and low left sided chest pain for the last day. Pt is tachycardic to the 122, anxious and tachypnic. Initially considered renal stone. Pain control with Toradol but still tachycardic into the 130s. UA is negative, but pt does have a white count of 5 with left shift of 85%.    As the supervising MD I agree with the above PE.    As the supervising MD I agree with the above treatment, course, plan, and disposition.  I have reviewed and agree with the residents interpretation of the following: lab data, EKG and CT scans.  I have reviewed the following: old records at this facility.                    ED Course      Clinical Impression:   The encounter diagnosis was Tachycardia.    Disposition:   Disposition: Discharged  Condition: Stable                        Princess Sunshine,  MD  Resident  11/17/17 2436       Torres Guardado MD  12/11/17 0915

## 2017-11-17 NOTE — PROVIDER PROGRESS NOTES - EMERGENCY DEPT.
Encounter Date: 11/17/2017    ED Physician Progress Notes         EKG - STEMI Decision  Initial Reading: No STEMI present.    I, Isaura Manuel, am scribing for, and in the presence of, Dr. Guardado. I performed the above scribed service and the documentation accurately describes the services I performed. I attest to the accuracy of the note.

## 2017-11-19 LAB — BACTERIA UR CULT: NORMAL

## 2018-03-19 ENCOUNTER — PATIENT MESSAGE (OUTPATIENT)
Dept: OPTOMETRY | Facility: CLINIC | Age: 23
End: 2018-03-19

## 2018-05-14 PROBLEM — Z3A.34 34 WEEKS GESTATION OF PREGNANCY: Status: RESOLVED | Noted: 2017-06-01 | Resolved: 2018-05-14

## 2018-06-18 PROBLEM — Z3A.39 PREGNANCY WITH 39 COMPLETED WEEKS GESTATION: Status: RESOLVED | Noted: 2017-07-06 | Resolved: 2018-06-18

## 2018-08-27 ENCOUNTER — OFFICE VISIT (OUTPATIENT)
Dept: OPTOMETRY | Facility: CLINIC | Age: 23
End: 2018-08-27
Payer: COMMERCIAL

## 2018-08-27 DIAGNOSIS — H52.222 REGULAR ASTIGMATISM, LEFT EYE: ICD-10-CM

## 2018-08-27 DIAGNOSIS — R51.9 FREQUENT HEADACHES: Primary | ICD-10-CM

## 2018-08-27 DIAGNOSIS — Z83.511 FAMILY HISTORY OF GLAUCOMA: ICD-10-CM

## 2018-08-27 PROCEDURE — 92004 COMPRE OPH EXAM NEW PT 1/>: CPT | Mod: S$GLB,,, | Performed by: OPTOMETRIST

## 2018-08-27 PROCEDURE — 92015 DETERMINE REFRACTIVE STATE: CPT | Mod: S$GLB,,, | Performed by: OPTOMETRIST

## 2018-08-27 PROCEDURE — 99999 PR PBB SHADOW E&M-EST. PATIENT-LVL III: CPT | Mod: PBBFAC,,, | Performed by: OPTOMETRIST

## 2018-08-27 RX ORDER — TRAMADOL HYDROCHLORIDE 50 MG/1
TABLET ORAL
Refills: 0 | COMMUNITY
Start: 2018-06-19 | End: 2020-01-23

## 2018-08-27 RX ORDER — CYCLOBENZAPRINE HCL 10 MG
TABLET ORAL
Refills: 0 | COMMUNITY
Start: 2018-06-19 | End: 2022-01-05 | Stop reason: CLARIF

## 2018-08-27 RX ORDER — FAMOTIDINE 20 MG/1
TABLET, FILM COATED ORAL
Refills: 0 | COMMUNITY
Start: 2018-06-19 | End: 2020-01-23

## 2018-08-27 RX ORDER — KETOROLAC TROMETHAMINE 10 MG/1
TABLET, FILM COATED ORAL
Refills: 0 | COMMUNITY
Start: 2018-06-22 | End: 2020-01-23

## 2018-08-27 RX ORDER — PHENTERMINE HYDROCHLORIDE 37.5 MG/1
TABLET ORAL
Refills: 0 | COMMUNITY
Start: 2018-08-24 | End: 2020-01-23

## 2018-08-27 NOTE — LETTER
August 29, 2018      Other  [address] [place] [address]           [facility]  [address]  [facility] [address]  Phone: [phone]          Patient: Rhea Gilman   MR Number: [id number]   YOB: 1995   Date of Visit: 8/27/2018       Dear Other:    Thank you for referring Rhea Gilman to me for evaluation. Attached you will find relevant portions of my assessment and plan of care.    If you have questions, please do not hesitate to call me. I look forward to following Rhea Gilman along with you.    Sincerely,    Laverne Archibald, OD    Enclosure  CC:  No Recipients    If you would like to receive this communication electronically, please contact [email or web address] or [phone] to request more information on [facility] access.    For providers and/or their staff who would like to refer a patient to [facility], please contact us through our one-stop-shop provider referral line, [facility], at [phone].    If you feel you have received this communication in error or would no longer like to receive these types of communications, please e-mail [email or web address]

## 2018-08-27 NOTE — PATIENT INSTRUCTIONS
ANISOMETROPIA    A majority of people who wear contacts and glasses have some degree of anisometropia. Anisometropia is characterized by a difference in the strenghth of the correcting lens between the two eyes. Most eye care professionals often reserve the use of this term for cases in which the difference is significant, around 2.00 diopters or more. Another form of anisometropia is called anitmetropia and this is a condition in which the power designations for each eye differ. For example the right eye may have a plus power while the other has a minus power. The refractive error itself is not too problematic for the patient. However when spectacles are used to correct this error problems can begin to arise.    All corrective lenses displace images. In the case of a converging or magnifying lens the images are displaced towards the edges of the lens creating magnification. In the case of a diverging or minifying lens the images are displaced towards the center of the lens creating minification. The amount of displacement depends of the power of the lens. In the case of a anisometropic patient the image displacement between the right and left eyes differ greatly. As the wearer moves their eyes away from the center of the lens this displacement increases, sometimes so much so that the double vision occurs. This is more of a problem for the presbyope who needs to look away from the center of the lens to view through the reading portion of thier progressive or bifocal lens. People with distance vision and anisometropia will generaly tilt their whole head to view through the centers of thier lenses.    There are several ways to correct for this prism imbalance. A popular technique used when making the spectacle lenses is called bicentric grinding or slab off. In this case either the eye with the most minus power or least plus power has prism ground in only half of the lens, creating a visible line that bisects the  lens. Another option would be that the wearer could have two sets of glasses one for reading and the other for distance. However most patients prefer to wear contact lenses. Contact lenses greatly reduce the amount of prismatic effect experienced with spectacle wear. When a contact lens wearer rotates his or her eyes the lens stays relatively stationary moving with the eye and reducing the amount of prism otherwise experienced with spectacle lenses.    The exact cause of anisometropia is not to easy to pin down. It could be congenital or be the result of trauma. Often if one eye has a less significant refractive error than the other eye it will become dominant, in fact so much so that the non dominant eye will loose much of its visual function. This condition is called amblyopia. With the loss of function this amblyopic eye often begins to wander creating a condition known as strabismus.

## 2018-08-29 NOTE — PROGRESS NOTES
HPI     Ms. Rhea Gilman is here for a comprehensive eye exam.    Patient complains of blurry distance vision for over 1 year, pt lost her   prescription glasses about 1 year ago. She requests refraction today.    Pt also complains of bilateral temporal headaches. Started after she lost   her glasses. Occurs almost daily. Can occur at any time of day, but   usually in the evenings. Lasts about 2 hours. Improved with ibuprofen.     (-)drops  (+)flashes: pt states with in the last 6 months she may have seen 4   flashes in her OS  (-)floaters  (-)diplopia    Diabetic no  No results found for: HGBA1C    OCULAR HISTORY  Last Eye Exam 2 years ago at [facility]  (-)eye surgery   (-)diagnosed or treated for any eye conditions or diseases none     FAMILY HISTORY  (+)Glaucoma: mother (resulting in blindness OU)         Last edited by Laverne Archibald, OD on 8/29/2018  1:53 PM. (History)            Assessment /Plan     For exam results, see Encounter Report.    Frequent headaches   Possibly due to asymmetric and uncorrected refractive error. See PCP if headaches do not improve after wearing glasses.    Regular astigmatism, left eye   New glasses prescription released, adaptation expected.    Eyeglass Final Rx     Eyeglass Final Rx       Sphere Cylinder Axis    Right [place] Sphere     Left -0.50 +0.75 105    Type:  SVL    Expiration Date:  8/28/2019            Family history of glaucoma   No signs of glaucoma today OU. Monitor.      RTC 1 year

## 2018-10-23 ENCOUNTER — CLINICAL SUPPORT (OUTPATIENT)
Dept: LAB | Facility: HOSPITAL | Age: 23
End: 2018-10-23
Attending: FAMILY MEDICINE
Payer: MEDICAID

## 2018-10-23 ENCOUNTER — OFFICE VISIT (OUTPATIENT)
Dept: FAMILY MEDICINE | Facility: HOSPITAL | Age: 23
End: 2018-10-23
Attending: FAMILY MEDICINE
Payer: MEDICAID

## 2018-10-23 VITALS
HEART RATE: 85 BPM | WEIGHT: 176.81 LBS | HEIGHT: 64 IN | BODY MASS INDEX: 30.19 KG/M2 | SYSTOLIC BLOOD PRESSURE: 116 MMHG | DIASTOLIC BLOOD PRESSURE: 78 MMHG

## 2018-10-23 DIAGNOSIS — I48.0 PAROXYSMAL ATRIAL FIBRILLATION: Primary | ICD-10-CM

## 2018-10-23 DIAGNOSIS — J30.89 SEASONAL ALLERGIC RHINITIS DUE TO OTHER ALLERGIC TRIGGER: ICD-10-CM

## 2018-10-23 DIAGNOSIS — Z23 ENCOUNTER FOR VACCINATION: ICD-10-CM

## 2018-10-23 DIAGNOSIS — I48.0 PAROXYSMAL ATRIAL FIBRILLATION: ICD-10-CM

## 2018-10-23 DIAGNOSIS — Z83.3 FAMILY HISTORY OF DIABETES MELLITUS: ICD-10-CM

## 2018-10-23 DIAGNOSIS — G43.009 MIGRAINE WITHOUT AURA AND WITHOUT STATUS MIGRAINOSUS, NOT INTRACTABLE: ICD-10-CM

## 2018-10-23 PROCEDURE — 99213 OFFICE O/P EST LOW 20 MIN: CPT | Performed by: FAMILY MEDICINE

## 2018-10-23 PROCEDURE — 90651 9VHPV VACCINE 2/3 DOSE IM: CPT | Performed by: FAMILY MEDICINE

## 2018-10-23 RX ORDER — FEXOFENADINE HCL 60 MG
60 TABLET ORAL DAILY PRN
Qty: 60 TABLET | Refills: 1 | Status: SHIPPED | OUTPATIENT
Start: 2018-10-23 | End: 2020-01-23

## 2018-10-23 RX ORDER — PROPRANOLOL HYDROCHLORIDE 10 MG/1
20 TABLET ORAL 3 TIMES DAILY
Qty: 180 TABLET | Refills: 11 | Status: SHIPPED | OUTPATIENT
Start: 2018-10-23 | End: 2020-01-23 | Stop reason: SDUPTHER

## 2018-10-23 RX ORDER — SUMATRIPTAN SUCCINATE 25 MG/1
50 TABLET ORAL
Qty: 30 TABLET | Refills: 1 | Status: SHIPPED | OUTPATIENT
Start: 2018-10-23 | End: 2020-01-23

## 2018-10-23 RX ORDER — BUTALBITAL, ACETAMINOPHEN AND CAFFEINE 50; 325; 40 MG/1; MG/1; MG/1
TABLET ORAL
Refills: 0 | COMMUNITY
Start: 2018-09-26 | End: 2020-01-23

## 2018-10-23 RX ORDER — FLUTICASONE PROPIONATE 50 MCG
1 SPRAY, SUSPENSION (ML) NASAL DAILY
Qty: 15.8 ML | Refills: 1 | Status: SHIPPED | OUTPATIENT
Start: 2018-10-23 | End: 2023-01-27

## 2018-10-23 RX ORDER — MELOXICAM 15 MG/1
TABLET ORAL
Refills: 0 | COMMUNITY
Start: 2018-09-26 | End: 2020-01-23

## 2018-10-23 NOTE — PROGRESS NOTES
Subjective:       Patient ID: Rhea Gilman is a 23 y.o. female.    Chief Complaint: Headache    Ms. Gilman is a 23 year old female with PMH paroxysmal afib who presents for preventative exam.     Patient reports headaches have been bothering her for about a year. Hasn't seen a doctor for them. Bitemporal, every two days, last 1-2hours. Relieved with sleep. Ibuprofren helps. Has a feeling of lightheadedness before the headaches start, occasional blurry vision. Denies N/V. Keterolac from the ED has helped a little bit as well. States wine has made them worse in the past. Hasn't made a headache diary. A cup a day of coffee, no soda.     Also reports a head cold for two weeks, coughs up yellow mucous. Denies SOB/CP. Does report facial tenderness and worse symptoms with leaning forward. Does report worsening symptoms with the seasonal changes.     Upon chart review, patient had paroxysmal afib in 06/17 while 33 weeks pregnant that was attributed to amphetamine exposure and electrolyte abnormality (hypokalemia, hypomagnesemia).     Patiently recently saw optometrist and was RX'd glasses for astigmatism & headaches on 08/29.     Patient is unsure if she has had a pap smear in the past. Doesn't currently have an ob/gyn. Has a one year old girl at home, doing well, single. Lives alone with daughter, has family in the area, feels stable at home. Sexually active in the past year with one partner, not currently on contraception. Stopped the depot shots in January. Not interested in any BC at this time. Not currently taking prenatals. Denies vaginal discharge/vaginal symptoms.     Denies family history of migraines or similar headaches.     Reports smoking 2 cigarettes, denies alcohol, denies drug use.       Review of Systems   Constitutional: Negative for chills and fever.   HENT: Negative for congestion and sore throat.    Eyes: Negative for pain and redness.   Respiratory: Negative for cough and shortness of breath.     Cardiovascular: Negative for chest pain and palpitations.   Gastrointestinal: Negative for diarrhea, nausea and vomiting.   Genitourinary: Negative for difficulty urinating and dysuria.   Musculoskeletal: Negative for arthralgias and myalgias.   Skin: Negative for rash.   Neurological: Negative for syncope and headaches.   Psychiatric/Behavioral: Negative for agitation and confusion.       Objective:      Vitals:    10/23/18 1348   BP: 116/78   Pulse: 85     Physical Exam   Constitutional: She is oriented to person, place, and time. She appears well-developed and well-nourished. No distress.   HENT:   Head: Normocephalic and atraumatic.   Right Ear: External ear normal.   Left Ear: External ear normal.   Red, swollen, boggy turbinates; minor pharyngeal erythema without exudate. TM's with good light reflex.    Eyes: Conjunctivae are normal. Pupils are equal, round, and reactive to light. Right eye exhibits no discharge. Left eye exhibits no discharge.   Neck: Normal range of motion. Neck supple. No JVD present.   Cardiovascular: Normal rate and regular rhythm. Exam reveals no gallop and no friction rub.   No murmur heard.  Pulmonary/Chest: Effort normal and breath sounds normal. No respiratory distress. She has no wheezes. She has no rales.   Abdominal: Soft. Bowel sounds are normal. She exhibits no distension. There is no tenderness.   Musculoskeletal: Normal range of motion.   Neurological: She is alert and oriented to person, place, and time. She has normal reflexes. No cranial nerve deficit.   Skin: Skin is warm. No rash noted. No erythema.   Psychiatric: She has a normal mood and affect. Her behavior is normal.       Assessment:       1. Paroxysmal atrial fibrillation    2. Seasonal allergic rhinitis due to other allergic trigger    3. Migraine without aura and without status migrainosus, not intractable    4. Family history of diabetes mellitus    5. Encounter for vaccination        Plan:       Paroxysmal  atrial fibrillation  -     SCHEDULED EKG 12-LEAD (to Muse); Future    Seasonal allergic rhinitis due to other allergic trigger  -     fluticasone (FLONASE ALLERGY RELIEF) 50 mcg/actuation nasal spray; 1 spray (50 mcg total) by Each Nare route once daily.  Dispense: 15.8 mL; Refill: 1  -     fexofenadine (ALLEGRA) 60 MG tablet; Take 1 tablet (60 mg total) by mouth daily as needed (for congestion).  Dispense: 60 tablet; Refill: 1    Migraine without aura and without status migrainosus, not intractable  -     propranolol (INDERAL) 10 MG tablet; Take 2 tablets (20 mg total) by mouth 3 (three) times daily.  Dispense: 180 tablet; Refill: 11  -     sumatriptan (IMITREX) 25 MG Tab; Take 2 tablets (50 mg total) by mouth every 2 (two) hours as needed.  Dispense: 30 tablet; Refill: 1  -     Comprehensive metabolic panel; Future; Expected date: 10/23/2018    Family history of diabetes mellitus  -     Hemoglobin A1c; Future; Expected date: 10/23/2018  -     Lipid panel; Future; Expected date: 10/23/2018  -     Comprehensive metabolic panel; Future; Expected date: 10/23/2018    Encounter for vaccination  -     (In Office Administered) HPV Vaccine (9-Valent) (3 Dose) (IM)    Counseled patient on a migraine diary, patient notes wine may be a trigger. Counseled on the importance of a daily prenatal vitamin with folate and the risk of pregnancy without contraception. Counseled patient on smoking cessation.     Follow-up in about 2 weeks (around 11/6/2018) for for women's health visit with pap smear.

## 2018-10-24 NOTE — PROGRESS NOTES
I have reviewed the notes, assessments, and/or procedures performed by Dr. whittington, I concur with her/his documentation of Rhea Gilman.

## 2019-02-25 ENCOUNTER — TELEPHONE (OUTPATIENT)
Dept: PLASTIC SURGERY | Facility: CLINIC | Age: 24
End: 2019-02-25

## 2019-02-25 NOTE — TELEPHONE ENCOUNTER
Pt phone said there were restrictions to certain numbers and my call could not be processed at the time.

## 2019-03-01 ENCOUNTER — OFFICE VISIT (OUTPATIENT)
Dept: URGENT CARE | Facility: CLINIC | Age: 24
End: 2019-03-01
Payer: MEDICAID

## 2019-03-01 VITALS
DIASTOLIC BLOOD PRESSURE: 72 MMHG | HEIGHT: 64 IN | TEMPERATURE: 98 F | WEIGHT: 165 LBS | HEART RATE: 70 BPM | BODY MASS INDEX: 28.17 KG/M2 | SYSTOLIC BLOOD PRESSURE: 130 MMHG

## 2019-03-01 DIAGNOSIS — H10.211: Primary | ICD-10-CM

## 2019-03-01 DIAGNOSIS — H57.11 ACUTE RIGHT EYE PAIN: ICD-10-CM
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
## 2019-03-01 DIAGNOSIS — Z02.6 ENCOUNTER RELATED TO WORKER'S COMPENSATION CLAIM: ICD-10-CM

## 2019-03-01 LAB
BREATH ALCOHOL: YES
CTP QC/QA: YES
POC 10 PANEL DRUG SCREEN: NORMAL

## 2019-03-01 PROCEDURE — 82075 POCT BREATH ALCOHOL TEST: ICD-10-PCS | Mod: S$GLB,,, | Performed by: NURSE PRACTITIONER

## 2019-03-01 PROCEDURE — 80305 DRUG TEST PRSMV DIR OPT OBS: CPT | Mod: QW,S$GLB,, | Performed by: NURSE PRACTITIONER

## 2019-03-01 PROCEDURE — 99204 PR OFFICE/OUTPT VISIT, NEW, LEVL IV, 45-59 MIN: ICD-10-PCS | Mod: S$GLB,,, | Performed by: NURSE PRACTITIONER

## 2019-03-01 PROCEDURE — 82075 ASSAY OF BREATH ETHANOL: CPT | Mod: S$GLB,,, | Performed by: NURSE PRACTITIONER

## 2019-03-01 PROCEDURE — 80305 POCT RAPID DRUG SCREEN 10 PANEL: ICD-10-PCS | Mod: QW,S$GLB,, | Performed by: NURSE PRACTITIONER

## 2019-03-01 PROCEDURE — 99204 OFFICE O/P NEW MOD 45 MIN: CPT | Mod: S$GLB,,, | Performed by: NURSE PRACTITIONER

## 2019-03-01 RX ORDER — TOBRAMYCIN 3 MG/ML
SOLUTION/ DROPS OPHTHALMIC
Qty: 5 ML | Refills: 0 | Status: SHIPPED | OUTPATIENT
Start: 2019-03-01 | End: 2019-03-07

## 2019-03-01 NOTE — LETTER
[facility]  [address] [place] [address]  Phone: [phone]  Fax: [phone]  [facility]: [id number]    Pt Name: Rhea Gilman  Injury Date: 03/01/2019   Employee ID:  Date of Treatment: 03/01/2019   Company: [employer]      Appointment Time: 09:30 AM Arrived: 9:32AM   Provider: Princess Brasher NP Time Out: 11:50AM     Office Treatment:   EXAM  RX GIVEN  REGULAR DUTY RESUMES 3/2/19    1. Conjunctivitis, chemical, right    2. Encounter related to worker's compensation claim    3. Work related injury    4. Acute right eye pain      Medications Ordered This Encounter   Medications    tobramycin sulfate 0.3% (TOBREX) 0.3 % ophthalmic solution           Restrictions: Home today, Regular Duty(Regular work starting 3/2/19.)     Return Appointment: 3/4/2019 at 11:00AM

## 2019-03-01 NOTE — PROGRESS NOTES
Subjective:       Patient ID: Rhea Gilman is a 23 y.o. female.    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 Patient states on 3/1/19 @ 0715 while cleaning the room, she pulled a [employer] Cloth Wipe out of the container and the fluid splashed her right eye. Patient c/o immediate loss of vision for 10 minutes, washed eye out at eye wash station with assistance, no prior injury to right eye. Patient only wears glasses for reading. Ambulatory. MJB Says she recently got new glasses that she wears all the time. Td is UTD. MWT      Eye Pain    The right eye is affected. This is a new problem. The current episode started today. The problem occurs constantly. The problem has been gradually worsening. The injury mechanism was a chemical exposure. The pain is at a severity of 7/10. The pain is moderate. There is no known exposure to pink eye. She does not wear contacts. Associated symptoms include blurred vision, eye redness, itching and photophobia. Pertinent negatives include no fever, nausea or vomiting. She has tried water for the symptoms. The treatment provided mild relief.     Review of Systems   Constitution: Negative for chills and fever.   HENT: Negative for congestion and sore throat.    Eyes: Positive for blurred vision, itching, pain, photophobia, redness, vision loss in right eye and visual disturbance.        Burning, stinging pain   Cardiovascular: Negative for chest pain.   Respiratory: Negative for shortness of breath.    Skin: Negative for rash.   Musculoskeletal: Negative for back pain and joint pain.   Gastrointestinal: Negative for abdominal pain, diarrhea, nausea and vomiting.   Neurological: Negative for headaches.   Psychiatric/Behavioral: The patient is not nervous/anxious.        Objective:      Physical Exam   Constitutional: She is oriented to person, place, and time. She appears well-developed and well-nourished. No distress.   HENT:   Right Ear:  External ear normal.   Left Ear: External ear normal.   Nose: Nose normal.   Eyes: Conjunctivae, EOM and lids are normal. Right eye exhibits no discharge and no exudate. No foreign body present in the right eye. Right conjunctiva is not injected.   R eye instilled with Proparacaine and Fluorescein drops. Examined eye, no FB, no abrasion seen. Flushed copiously with normal saline eye wash. Pt tolerated well. Says she is feeling much better.   Cardiovascular: Normal rate, regular rhythm, normal heart sounds and intact distal pulses.   Pulmonary/Chest: Effort normal and breath sounds normal.   Abdominal: Soft. Bowel sounds are normal.   Neurological: She is alert and oriented to person, place, and time.   Skin: Skin is warm and dry.   Psychiatric: She has a normal mood and affect. Her behavior is normal. Judgment and thought content normal.   Nursing note and vitals reviewed.      Assessment:       1. Conjunctivitis, chemical, right    2. Encounter related to worker's compensation claim    3. Work related injury    4. Acute right eye pain        Plan:     Td is UTD.    Medications Ordered This Encounter   Medications    tobramycin sulfate 0.3% (TOBREX) 0.3 % ophthalmic solution     Sig: Place 2 drops into the right eye every 2 (two) hours for 1 day, THEN 2 drops 4 (four) times daily for 5 days. 2 drops every 2 hours x 24 hours when awake then 4 times a day x 5 days..     Dispense:  5 mL     Refill:  0         Restrictions: Home today, Regular Duty(Regular work starting 3/2/19.)  Follow-up in about 3 days (around 3/4/2019).    If unable to RTC Monday then may RTC Wednesday if you are not having any problems.

## 2019-03-01 NOTE — LETTER
[facility]  [address] [place] [address]  Phone: [phone]  Fax: [phone]  [facility]: [id number]    Pt Name: Rhea Gilman  Injury Date: 03/01/2019   Employee ID:  Date of First Treatment: 03/01/2019   Company: [employer]      Appointment Time: 09:30 AM Arrived: ***   Provider: Princess Brasher NP Time Out:***     Office Treatment:   1. Conjunctivitis, chemical, right    2. Encounter related to worker's compensation claim    3. Work related injury    4. Acute right eye pain      Medications Ordered This Encounter   Medications    tobramycin sulfate 0.3% (TOBREX) 0.3 % ophthalmic solution                 Return Appointment: 3/4/2019 at ***

## 2019-07-09 ENCOUNTER — HOSPITAL ENCOUNTER (EMERGENCY)
Facility: HOSPITAL | Age: 24
Discharge: HOME | End: 2019-07-09
Attending: EMERGENCY MEDICINE
Payer: MEDICAID

## 2019-07-09 VITALS
HEIGHT: 64 IN | OXYGEN SATURATION: 100 % | DIASTOLIC BLOOD PRESSURE: 73 MMHG | RESPIRATION RATE: 24 BRPM | WEIGHT: 177 LBS | TEMPERATURE: 99 F | BODY MASS INDEX: 30.22 KG/M2 | SYSTOLIC BLOOD PRESSURE: 122 MMHG | HEART RATE: 82 BPM

## 2019-07-09 DIAGNOSIS — R19.7 DIARRHEA, UNSPECIFIED TYPE: ICD-10-CM

## 2019-07-09 DIAGNOSIS — R42 LIGHTHEADEDNESS: Primary | ICD-10-CM

## 2019-07-09 LAB
B-HCG UR QL: NEGATIVE
BACTERIA #/AREA URNS AUTO: ABNORMAL /HPF
BILIRUB UR QL STRIP: NEGATIVE
CLARITY UR REFRACT.AUTO: ABNORMAL
COLOR UR AUTO: YELLOW
CTP QC/QA: YES
GLUCOSE UR QL STRIP: NEGATIVE
HGB UR QL STRIP: NEGATIVE
KETONES UR QL STRIP: NEGATIVE
LEUKOCYTE ESTERASE UR QL STRIP: ABNORMAL
MICROSCOPIC COMMENT: ABNORMAL
NITRITE UR QL STRIP: NEGATIVE
PH UR STRIP: 6 [PH] (ref 5–8)
PROT UR QL STRIP: NEGATIVE
RBC #/AREA URNS AUTO: 1 /HPF (ref 0–4)
SP GR UR STRIP: 1.02 (ref 1–1.03)
SQUAMOUS #/AREA URNS AUTO: 2 /HPF
URN SPEC COLLECT METH UR: ABNORMAL
WBC #/AREA URNS AUTO: 5 /HPF (ref 0–5)

## 2019-07-09 PROCEDURE — 99285 PR EMERGENCY DEPT VISIT,LEVEL V: ICD-10-PCS | Mod: ,,, | Performed by: PHYSICIAN ASSISTANT

## 2019-07-09 PROCEDURE — 93005 ELECTROCARDIOGRAM TRACING: CPT

## 2019-07-09 PROCEDURE — 80047 BASIC METABLC PNL IONIZED CA: CPT

## 2019-07-09 PROCEDURE — 81025 URINE PREGNANCY TEST: CPT | Performed by: PHYSICIAN ASSISTANT

## 2019-07-09 PROCEDURE — 93010 EKG 12-LEAD: ICD-10-PCS | Mod: ,,, | Performed by: INTERNAL MEDICINE

## 2019-07-09 PROCEDURE — 96361 HYDRATE IV INFUSION ADD-ON: CPT

## 2019-07-09 PROCEDURE — 63600175 PHARM REV CODE 636 W HCPCS: Performed by: PHYSICIAN ASSISTANT

## 2019-07-09 PROCEDURE — 96374 THER/PROPH/DIAG INJ IV PUSH: CPT

## 2019-07-09 PROCEDURE — 93010 ELECTROCARDIOGRAM REPORT: CPT | Mod: ,,, | Performed by: INTERNAL MEDICINE

## 2019-07-09 PROCEDURE — 99284 EMERGENCY DEPT VISIT MOD MDM: CPT | Mod: 25

## 2019-07-09 PROCEDURE — 99285 EMERGENCY DEPT VISIT HI MDM: CPT | Mod: ,,, | Performed by: PHYSICIAN ASSISTANT

## 2019-07-09 PROCEDURE — 81001 URINALYSIS AUTO W/SCOPE: CPT

## 2019-07-09 PROCEDURE — 25000003 PHARM REV CODE 250: Performed by: PHYSICIAN ASSISTANT

## 2019-07-09 RX ORDER — ONDANSETRON 2 MG/ML
4 INJECTION INTRAMUSCULAR; INTRAVENOUS
Status: COMPLETED | OUTPATIENT
Start: 2019-07-09 | End: 2019-07-09

## 2019-07-09 RX ORDER — ONDANSETRON 4 MG/1
4 TABLET, FILM COATED ORAL EVERY 6 HOURS PRN
Qty: 15 TABLET | Refills: 0 | Status: SHIPPED | OUTPATIENT
Start: 2019-07-09 | End: 2020-01-23

## 2019-07-09 RX ADMIN — SODIUM CHLORIDE 1000 ML: 0.9 INJECTION, SOLUTION INTRAVENOUS at 02:07

## 2019-07-09 RX ADMIN — ONDANSETRON 4 MG: 2 INJECTION INTRAMUSCULAR; INTRAVENOUS at 02:07

## 2019-07-09 NOTE — ED PROVIDER NOTES
Encounter Date: 7/9/2019       History     Chief Complaint   Patient presents with    Abdominal Pain     nausea, diarrhea 3d ago, feeling dehydrated     24-year-old female with anemia and AFib presents for nausea, diarrhea and lightheadedness.  She endorses 3 days of frequent diarrhea which has since subsided.  She continues to feel nauseated and lightheaded with occasional palpitations.  She states that she feels dehydrated, she has also been outside a lot this weekend for her child's birthday party. Of note, the triage complaint is stated as abdominal pain, however she denies any abdominal pain to me.  She denies bloody or dark stool, urinary symptoms, fever, back pain, vaginal discharge, chest pain or shortness of breath. No sick contacts, recent antibiotic use or international travel.        Review of patient's allergies indicates:   Allergen Reactions    Amoxicillin      Past Medical History:   Diagnosis Date    Anemia     Atrial fibrillation     converted with [facility] at [facility]    Coronary artery disease     Migraine headache      Past Surgical History:   Procedure Laterality Date    CARDIOVERSION N/A 6/2/2017    Performed by Cora Roberts MD at [facility] CATH LAB     History reviewed. No pertinent family history.  Social History     Tobacco Use    Smoking status: Light Tobacco Smoker     Types: Cigarettes    Smokeless tobacco: Never Used    Tobacco comment: 1 pack 3 days   Substance Use Topics    Alcohol use: No    Drug use: No     Review of Systems   Constitutional: Positive for fatigue. Negative for chills, diaphoresis and fever.   Respiratory: Negative for cough and shortness of breath.    Cardiovascular: Positive for palpitations. Negative for chest pain.   Gastrointestinal: Positive for diarrhea and nausea. Negative for abdominal distention, abdominal pain, anal bleeding, blood in stool, constipation, rectal pain and vomiting.   Endocrine: Negative for polydipsia and polyuria.    Genitourinary: Negative for dysuria, hematuria, pelvic pain and vaginal discharge.   Musculoskeletal: Negative for myalgias.   Skin: Negative for color change and wound.   Allergic/Immunologic: Negative for environmental allergies and immunocompromised state.   Neurological: Positive for light-headedness. Negative for headaches.       Physical Exam     Initial Vitals [07/09/19 1317]   BP Pulse Resp Temp SpO2   129/74 98 18 98.1 °F (36.7 °C) 100 %      MAP       --         Physical Exam    Vitals reviewed.  Constitutional: She appears well-developed and well-nourished. She is not diaphoretic. No distress.   HENT:   Head: Normocephalic and atraumatic.   Eyes: EOM are normal. Pupils are equal, round, and reactive to light.   Neck: Normal range of motion. Neck supple.   Cardiovascular: Normal rate, regular rhythm, normal heart sounds and intact distal pulses. Exam reveals no gallop and no friction rub.    No murmur heard.  Pulmonary/Chest: Breath sounds normal. No respiratory distress. She has no wheezes. She has no rhonchi. She has no rales. She exhibits no tenderness.   Abdominal: Soft. Bowel sounds are normal. She exhibits no distension. There is no tenderness.   Musculoskeletal: Normal range of motion.   Neurological: She is alert and oriented to person, place, and time.   Skin: Skin is warm and dry.         ED Course   Procedures  Labs Reviewed   URINALYSIS, REFLEX TO URINE CULTURE - Abnormal; Notable for the following components:       Result Value    Appearance, UA Hazy (*)     Leukocytes, UA 1+ (*)     All other components within normal limits    Narrative:     Preferred Collection Type->Urine, Clean Catch   URINALYSIS MICROSCOPIC - Abnormal; Notable for the following components:    Bacteria Many (*)     All other components within normal limits    Narrative:     Preferred Collection Type->Urine, Clean Catch   ISTAT PROCEDURE - Abnormal; Notable for the following components:    POC TCO2 (MEASURED) 22 (*)      All other components within normal limits   POCT URINE PREGNANCY     EKG Readings: (Independently Interpreted)   Initial Reading: No STEMI. Previous EKG: Compared with most recent EKG Previous EKG Date: 11/17/2017. Rhythm: Normal Sinus Rhythm. Heart Rate: 82. Ectopy: No Ectopy. Conduction: Normal. ST Segments: Normal ST Segments. T Waves: Normal. Clinical Impression: Normal Sinus Rhythm     ECG Results          EKG 12-lead (Final result)  Result time 07/09/19 17:49:15    Final result by Interface, Lab In [facility] (07/09/19 17:49:15)                 Narrative:    Test Reason : R42,    Vent. Rate : 082 BPM     Atrial Rate : 082 BPM     P-R Int : 120 ms          QRS Dur : 074 ms      QT Int : 362 ms       P-R-T Axes : 046 022 023 degrees     QTc Int : 422 ms    Normal sinus rhythm with sinus arrhythmia  Normal ECG  When compared with ECG of 17-NOV-2017 15:31,  No significant change was found  Confirmed by Esperanza Porras MD ([id number]) on 7/9/2019 5:49:06 PM    Referred By: [email or web address]   SELF           Confirmed By:Esperanza Porras MD                            Imaging Results    None          Medical Decision Making:   History:   Old Medical Records: I decided to obtain old medical records.  Clinical Tests:   Lab Tests: Ordered and Reviewed  Medical Tests: Ordered and Reviewed       APC / Resident Notes:   24-year-old female presenting for resolved diarrhea, continued nausea, lightheadedness and occasional palpitations.  ED arrival, her vitals are normal and she appears well. Abdomen is soft and nontender with normoactive bowel sounds. Differential diagnosis includes pregnancy, dehydration, electrolyte derangement, arrhythmia, anemia.  Will check labs, give fluids, Zofran and reassess.    Patient reports improvement of symptoms after therapy.  Lab workup is unrevealing.  Of note, i-STAT noted normal electrolytes and hematocrit, however I am now not able to see this in the chart.  Given reassuring workup and clinical improvement, do  not believe she requires further ED treatment is stable for discharge. Will discharge with Zofran as needed. Stressed the importance of follow-up, strict ED return precautions given.  Patient voiced understanding and is comfortable with discharge. I discussed this patient with my supervising physician.    Elisa Galvez PA-C             Attending Attestation:     Physician Attestation Statement for NP/PA:   I discussed this assessment and plan of this patient with the NP/PA, but I did not personally examine the patient. The face to face encounter was performed by the NP/PA.                     Clinical Impression:       ICD-10-CM ICD-9-CM   1. Lightheadedness R42 780.4   2. Diarrhea, unspecified type R19.7 787.91         Disposition:   Disposition: Discharged  Condition: Stable                        Elisa Galvez PA-C  07/09/19 1943       Torres Guardado MD  07/11/19 2042

## 2019-07-09 NOTE — ED NOTES
Patient identifiers verified and correct for Ms Gilman  C/C: Fatigue SEE NN  APPEARANCE: awake and alert in NAD.  SKIN: warm, dry and intact. No breakdown or bruising.  MUSCULOSKELETAL: Patient moving all extremities spontaneously, no obvious swelling or deformities noted. Ambulates independently.  RESPIRATORY: Denies shortness of breath.Respirations unlabored.   CARDIAC: Denies CP, 2+ distal pulses; no peripheral edema  ABDOMEN: Abdomen soft, denies vomiting, diarrhea 3 days PTA  : voids spontaneously, denies difficulty  Neurologic: AAO x 4; follows commands equal strength in all extremities; denies numbness/tingling. Denies dizziness Positive gen weakness

## 2019-07-09 NOTE — ED TRIAGE NOTES
"Patient states she has been feeling "weak and dehydrated" x 1 week, diarrhea 3 days PTA, Denies vomiting or abd pain, Denies fevers, denies urinary symptoms.   "

## 2019-07-10 LAB
BUN SERPL-MCNC: 11 MG/DL (ref 6–30)
CHLORIDE SERPL-SCNC: 104 MMOL/L (ref 95–110)
CREAT SERPL-MCNC: 0.6 MG/DL (ref 0.5–1.4)
GLUCOSE SERPL-MCNC: 102 MG/DL (ref 70–110)
HCT VFR BLD CALC: 39 %PCV (ref 36–54)
POC IONIZED CALCIUM: 1.08 MMOL/L (ref 1.06–1.42)
POC TCO2 (MEASURED): 22 MMOL/L (ref 23–29)
POTASSIUM BLD-SCNC: 3.6 MMOL/L (ref 3.5–5.1)
SAMPLE: ABNORMAL
SODIUM BLD-SCNC: 139 MMOL/L (ref 136–145)

## 2019-09-26 ENCOUNTER — OFFICE VISIT (OUTPATIENT)
Dept: OPTOMETRY | Facility: CLINIC | Age: 24
End: 2019-09-26
Payer: MEDICAID

## 2019-09-26 DIAGNOSIS — R51.9 FREQUENT HEADACHES: Primary | ICD-10-CM

## 2019-09-26 DIAGNOSIS — Z13.5 SCREENING FOR EYE CONDITION: ICD-10-CM

## 2019-09-26 DIAGNOSIS — H52.223 REGULAR ASTIGMATISM OF BOTH EYES: ICD-10-CM

## 2019-09-26 PROCEDURE — 92014 PR EYE EXAM, EST PATIENT,COMPREHESV: ICD-10-PCS | Mod: S$PBB,,, | Performed by: OPTOMETRIST

## 2019-09-26 PROCEDURE — 99212 OFFICE O/P EST SF 10 MIN: CPT | Mod: PBBFAC | Performed by: OPTOMETRIST

## 2019-09-26 PROCEDURE — 99999 PR PBB SHADOW E&M-EST. PATIENT-LVL II: CPT | Mod: PBBFAC,,, | Performed by: OPTOMETRIST

## 2019-09-26 PROCEDURE — 92014 COMPRE OPH EXAM EST PT 1/>: CPT | Mod: S$PBB,,, | Performed by: OPTOMETRIST

## 2019-09-26 PROCEDURE — 92015 DETERMINE REFRACTIVE STATE: CPT | Mod: ,,, | Performed by: OPTOMETRIST

## 2019-09-26 PROCEDURE — 92015 PR REFRACTION: ICD-10-PCS | Mod: ,,, | Performed by: OPTOMETRIST

## 2019-09-26 PROCEDURE — 99999 PR PBB SHADOW E&M-EST. PATIENT-LVL II: ICD-10-PCS | Mod: PBBFAC,,, | Performed by: OPTOMETRIST

## 2019-09-26 NOTE — PATIENT INSTRUCTIONS
Apparently good ocular health in both eyes, but dilated fundus exam (DFE) not done today per request.  Suggest Ms. Gilman return at later date for no-charge visit for DFE.  Call [phone] to schedule that appointment.    Astigmatic refractive error in each eye.  Note change in refractive error in each eye.  New spectacle lens Rx issued for use as desired.  Okay to wear full-time    Repeat general eye exam and refraction in 12 - 18 months.

## 2019-09-26 NOTE — PROGRESS NOTES
"HPI     eye examination       Additional comments: General eye exam and refraction.  Mother has glaucoma - would like to be checked.  Feels [facility] with glasses prescribed by Dr. Archibald not as good as she would like.                     Comments     Patient's age: 24 y.o. 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 Archibald    Wears glasses? yes     If yes, wears  Full-time or part-time?  Part-time for distance and near  Present glasses are: Bifocal, SV Distance, SV Reading?  Single vision   Approximate age of present glasses:  One year   Got new glasses following last exam, or subsequently?:  yes   Any problem with [facility] with glasses?  Feels [facility] with Rx not as good as she   would like  Wears CLs?:  no        Headaches?  Typically frontal in the PM  Eye pain/discomfort?  no                                                                                     Flashes?  no  Floaters?  Yes   Diplopia/Double vision?  no  Patient's Ocular History:         Any eye surgeries? no         Any eye injury?  no         Any treatment for eye disease?  no  Family history of eye disease?  Mother: glaucoma.  None other known  Significant patient medical history:         1. Diabetes?  no       If yes, IDDM or NIDDM? na   2. HBP?  no              3. Other (describe):  no   ! OTC eyedrops currently using:  Artificial tears "sometimes"   ! Prescription eye meds currently using:  no   ! Any history of allergy/adverse reaction to any eye meds used   previously?  no    ! Any history of allergy/adverse reaction to eyedrops used during prior   eye exam(s)? no    ! Any history of allergy/adverse reaction to Novacaine or similar meds?   no   ! Any history of allergy/adverse reaction to Epinephrine or similar meds?   no    ! Patient okay with use of anesthetic eyedrops to check eye pressure?    yes        ! Patient okay with use of eyedrops to dilate pupils " today?  NO -   DECLINES USE OF DILATING DROPS TODAY   !  Allergies/Medications/Medical History/Family History reviewed today?    yes      PD =   67/63  Desired reading distance =    Approx computer distance =                                                                       Last edited by Kenny Gerardo, OD on 9/26/2019  1:21 PM. (History)            Assessment /Plan     For exam results, see Encounter Report.    1. Frequent headaches     2. Regular astigmatism of both eyes     3. Screening for eye condition                 Apparently good ocular health in both eyes, but dilated fundus exam (DFE) not done today per request.  Suggest Ms. Gilman return at later date for no-charge visit for DFE.  Call [phone] to schedule that appointment.    Astigmatic refractive error in each eye.  Note change in refractive error in each eye.  New spectacle lens Rx issued for use as desired.  Okay to wear full-time    Repeat general eye exam and refraction in 12 - 18 months.

## 2020-01-22 NOTE — PROGRESS NOTES
Subjective:       Patient ID: Rhea Gilman is a 24 y.o. female.    Chief Complaint: Consult    CC:Obesity, currently overweight.     Current attempts at weight loss: New pt to me, referred by Fabiano Ballesteros  No address on file , with Patient Active Problem List:     Atrial fibrillation- Did have Afib when she was pregnant. Has been on propanolol since 1 year after that.      Feels she is eating excessively. She is not exercising. Had gained about 60 lbs with pregnancy in 2017.       Previous diet attempts: nutrisystem. Special K. 1000 raymond diet I gave her.       History of medication for loss: Has been on phentermine intermittently in the past 2 years with Dr. Clarke. Has lost 30 lbs with this. Last filled in October. Has been off the phentermine for 2 months    Heaviest weight: 205#    Lightest weight:  131#    Goal weight: 145#      Last eye exam:    No glaucoma.     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 BF works out of time. Pt does cook.   Breakfast: grits, eggs and hill. Weekends: same. Sausage mcmuffin or oatmeal.     Lunch: gumbo. [employer]. Weekends: cabbage, sandwich.     Dinner: fried chicken or shrimp and fries. Jambalaya. Baked chicken, pot and green beans..    Snacks: smoothies ( hulk), chips, nuts.     Beverages: Coffee with 6 sugars or AS and creams. Soda- few/week. Water. ETOH- daiquiri on occasion.     Willingness to change:  10/10    EKG:        Normal sinus rhythm with sinus arrhythmia  Normal ECG  When compared with ECG of 17-NOV-2017 15:31,  No significant change was found    BMR: 1388 (inbody)    Review of Systems   Constitutional: Negative for chills and fever.   Respiratory: Negative for shortness of breath.         + snores.    Cardiovascular: Negative for chest pain, palpitations and leg swelling.   Gastrointestinal: Negative for constipation and diarrhea.        Denies GERD   Genitourinary:        Using condoms for contraception.      Neurological: Negative  "for dizziness and light-headedness.   Psychiatric/Behavioral: Negative for dysphoric mood. The patient is not nervous/anxious.        Objective:     /64   Ht 5' 4" (1.626 m)   Wt 78.5 kg (173 lb)   BMI 29.70 kg/m²    Physical Exam   Constitutional: She is oriented to person, place, and time. She appears well-developed and well-nourished. No distress.   overweight   HENT:   Head: Normocephalic and atraumatic.   Mouth/Throat: No oropharyngeal exudate.   Eyes: Pupils are equal, round, and reactive to light. EOM are normal. No scleral icterus.   Neck: Normal range of motion. Neck supple. No thyromegaly present.   Cardiovascular: Normal rate and normal heart sounds. Exam reveals no gallop and no friction rub.   No murmur heard.  Pulmonary/Chest: Effort normal and breath sounds normal. No respiratory distress. She has no wheezes.   Abdominal: Soft. Bowel sounds are normal. She exhibits no distension. There is no tenderness.   Musculoskeletal: Normal range of motion. She exhibits no edema.   Neurological: She is alert and oriented to person, place, and time. No cranial nerve deficit.   Skin: Skin is warm and dry. No erythema.   Psychiatric: She has a normal mood and affect. Her behavior is normal. Judgment normal.   Vitals reviewed.      Assessment:       1. Overweight (BMI 25.0-29.9)    2. Migraine without aura and without status migrainosus, not intractable        Plan:         Rhea was seen today for consult.    Diagnoses and all orders for this visit:    Overweight (BMI 25.0-29.9)  -     diethylpropion (TENUATE) 75 mg SR tablet; Take 1 tablet (75 mg total) by mouth once daily.    Migraine without aura and without status migrainosus, not intractable  -     Discontinue: propranolol (INDERAL) 10 MG tablet; Take 2 tablets (20 mg total) by mouth 3 (three) times daily.  -     propranolol (INDERAL) 10 MG tablet; Take 2 tablets (20 mg total) by mouth 3 (three) times daily.     Patient warned of common side effects of " diethylpropion including anxiety, insomnia, palpitations and increased blood pressure. It was also explained that it is for short-term usage along with diet and exercise, and that stopping the medication without making lifestyle changes will result in regain of weight. Patient states understanding.    Weight loss medications are controlled substances.  They require routine follow up. Prescription or pills that are lost or destroyed will not be replaced.         1000 calories a day  40%  carbs(100 grams)  30% protein (75 grams)  30 % fat (33 grams)  Food diary or calorie tracking Karlene      Exercise -Add some type of resistance training 2-3 days a week. These can be body weight exercises, light weight or elastic bands. [employer] and [employer] are great sources for free work out plans and videos.       1000 raymond meal planner and 30 min recipes given.

## 2020-01-23 ENCOUNTER — INITIAL CONSULT (OUTPATIENT)
Dept: BARIATRICS | Facility: CLINIC | Age: 25
End: 2020-01-23
Payer: COMMERCIAL

## 2020-01-23 VITALS
HEIGHT: 64 IN | SYSTOLIC BLOOD PRESSURE: 110 MMHG | BODY MASS INDEX: 29.53 KG/M2 | HEART RATE: 90 BPM | DIASTOLIC BLOOD PRESSURE: 64 MMHG | WEIGHT: 173 LBS

## 2020-01-23 DIAGNOSIS — E66.3 OVERWEIGHT (BMI 25.0-29.9): Primary | ICD-10-CM

## 2020-01-23 DIAGNOSIS — G43.009 MIGRAINE WITHOUT AURA AND WITHOUT STATUS MIGRAINOSUS, NOT INTRACTABLE: ICD-10-CM

## 2020-01-23 PROCEDURE — 99999 PR PBB SHADOW E&M-EST. PATIENT-LVL III: ICD-10-PCS | Mod: PBBFAC,,, | Performed by: INTERNAL MEDICINE

## 2020-01-23 PROCEDURE — 3008F PR BODY MASS INDEX (BMI) DOCUMENTED: ICD-10-PCS | Mod: CPTII,S$GLB,, | Performed by: INTERNAL MEDICINE

## 2020-01-23 PROCEDURE — 99213 PR OFFICE/OUTPT VISIT, EST, LEVL III, 20-29 MIN: ICD-10-PCS | Mod: S$GLB,,, | Performed by: INTERNAL MEDICINE

## 2020-01-23 PROCEDURE — 99213 OFFICE O/P EST LOW 20 MIN: CPT | Mod: S$GLB,,, | Performed by: INTERNAL MEDICINE

## 2020-01-23 PROCEDURE — 99999 PR PBB SHADOW E&M-EST. PATIENT-LVL III: CPT | Mod: PBBFAC,,, | Performed by: INTERNAL MEDICINE

## 2020-01-23 PROCEDURE — 3008F BODY MASS INDEX DOCD: CPT | Mod: CPTII,S$GLB,, | Performed by: INTERNAL MEDICINE

## 2020-01-23 RX ORDER — PROPRANOLOL HYDROCHLORIDE 10 MG/1
20 TABLET ORAL 3 TIMES DAILY
Qty: 270 TABLET | Refills: 0 | Status: SHIPPED | OUTPATIENT
Start: 2020-01-23 | End: 2021-02-03 | Stop reason: SDUPTHER

## 2020-01-23 RX ORDER — DIETHYLPROPION HYDROCHLORIDE 75 MG/1
75 TABLET, EXTENDED RELEASE ORAL DAILY
Qty: 30 TABLET | Refills: 2 | Status: SHIPPED | OUTPATIENT
Start: 2020-01-23 | End: 2020-05-11

## 2020-01-23 RX ORDER — PROPRANOLOL HYDROCHLORIDE 10 MG/1
20 TABLET ORAL 3 TIMES DAILY
Qty: 270 TABLET | Refills: 0 | Status: SHIPPED | OUTPATIENT
Start: 2020-01-23 | End: 2020-01-23 | Stop reason: SDUPTHER

## 2020-01-23 NOTE — PATIENT INSTRUCTIONS
Patient warned of common side effects of diethylpropion including anxiety, insomnia, palpitations and increased blood pressure. It was also explained that it is for short-term usage along with diet and exercise, and that stopping the medication without making lifestyle changes will result in regain of weight. Patient states understanding.    Weight loss medications are controlled substances.  They require routine follow up. Prescription or pills that are lost or destroyed will not be replaced.         1000 calories a day  40%  carbs(100 grams)  30% protein (75 grams)  30 % fat (33 grams)  Food diary or calorie tracking Karlene      Exercise -Add some type of resistance training 2-3 days a week. These can be body weight exercises, light weight or elastic bands. [employer] and [employer] are great sources for free work out plans and videos.           Dinner in Under 30 minutes and 400 calories.     Baked Chicken breast with Broccoli Cheese Casserole  2-3 chicken breast (approximately 6-8 oz each)    2 tsp each garlic and herb Mrs. Dash seasoning   2 tsp your favorite creole seasoning  2 tablespoons of balsamic vinegar  2 - 10 oz packages of frozen broccoli  1 egg   ½ cup skim milk  ½ tsp paprika   ½ tsp cayenne pepper   1 can fat free cream of mushroom soup.   1 .5 cups of shredded cheddar cheese    Pre-heat oven to 450 degrees. Toss 2-3 chicken breast (approximately 6-8 oz each) in 2 tsp each garlic and herb Mrs. Dash seasoning, 2 tsp your favorite creole seasoning, and 2 tablespoons of balsamic vinegar. This can also be done up to 24 hours ahead of time, and the chicken can be left in the refrigerator to marinate. Place on a baking sheet sprayed with non-stick cooking spray and bake on 450 degrees for 10 min, then reduce heat to 350 degrees and cook an addition 10-15 minutes until chicken is cooked through.   While chicken is baking, microwave safe dish, thaw 2 - 10 oz packages of frozen broccoli. Drain any excess water,  season with salt, pepper, all-purpose seasoning of your choice. In another bowl, whisk together 1 egg, ½ cup skim milk, ½ tsp paprika, ½ tsp cayenne pepper and 1 can fat free cream of mushroom soup. Combine broccoli, soup mixture, 1 cup of shredded cheddar cheese in baking dish sprayed with cooking spray. Top with remaining cheese. Bake in the oven with chicken for about 20 min or until set cheese is melted and cárdenas.     Makes 4 servings. 389 calories per serving.10 g fat, 13 g carbs, 54g protein     Sheet Pan [place] Shrimp  1 pound large shrimp, shelled, peeled and deveined.   2 tablespoon olive oil  The zest and the juice of 1 lime  ½ tsp chili powder  ½-1 tsp cayenne pepper  1 tsp cumin  ½ tsp dry oregano  1 red bell pepper  1 green bell pepper  1 red onion  2 cups mushrooms, quartered  1 avocado  Whole tiffanie lettuce leaves  Preheat oven to 375 degrees.  In a bowl combine shrimp, lime juice, lime zest, cumin, cayenne pepper, chili powder, and a pinch of salt. Set aside.   Slice peppers and onions into thin strips. Toss in 1 tablespoon of olive oil. Sprinkle with salt and pepper and arrange in a single layer over 1/3 of a large sheet pan  Toss mushrooms with remaining olive oil, oregano, salt and pepper. Arrange in single layer on sheet pan. Place sheet pan in oven for about 15-20 minutes until vegetables are softened, cooked about ¾ of the way through. Remove the sheet pan from oven.  Change setting on oven to low broil.  If needed, rearrange vegetables to make room for shrimp. Arrange the shrimp in a single layer on the sheet pan and return pan to oven. After 5 minutes, flip shrimp. Cook 3-5 additional minutes, or until  Shrimp are opaque.   Serve shrimp and cooked vegetables on lettuce leaves with sliced avocado.   Makes 4 servings. Calories per servin; 23g fat, 19 g carbohydrates, 27g protein.    Zoodles Primavera with Seasoned Ricotta  8 cups zucchini noodles. These can be purchased already  "prepared, or you can make them on your own with whole zucchini and a vegetable spiralizer.   1.5 cups cherry tomatoes, quartered  2 cups sliced mushrooms  1 cup chopped fresh broccoli  1 cup frozen peas and carrots  2 cloves garlic, finely chopped  16 oz part skim ricotta cheese  2 oz Neufchatel cream cream cheese, cut into small cubes  Juice and zest of 1 lemon  1 pinch red pepper flakes    2 tsp Italian seasoning  4-5 leaves fresh basil, thinly sliced  1 tablespoon of olive oil  Parmesan cheese for topping (optional)     In a bowl, combine ricotta cheese, 1 tsp Italian seasoning, ½ teaspoon lemon zest. Season with salt and pepper to taste. Mix well. Fold in half of fresh basil. Set aside.   Heat a large skillet to medium high. Add olive oil. Sautee mushrooms until starting to become tender. Season them with salt and pepper while cooking.  Add broccoli and peas and carrots. Reduce heat to medium. Cover pan and cook for 4-5 minutes until broccoli is tender and peas and carrots are warmed through. Add Neufchatel cheese, Italian seasoning, red pepper flakes, 1 tsp lemon zest and lemon juice. Stir until a smooth sauce is formed. Add zucchini noodles (zoodles) to skillet and toss in sauce, then add cherry tomatoes.  Separate zoodle and vegetables into 4 equal portions. Serve each with a ¼ cup serving of seasoned ricotta cheese. Sprinkle with grated parmesan cheese or fresh basil,  if desired.   Makes 4 servings. Calories per servin calories, 32 g carbs, 33 g protein, 18 g fat      Vegetarian Meal Plan                  1000 calorie Vegetarian Meal Plan  STARCHES 80 CALORIES PER SERVING 15g CARB, 3g PROTEIN, 1g FAT  Servings per day    bread    tortilla    crackers    cooked cereals    dry cereals    pasta    rice    corn    popcorn    potato (small)    potato, mashed    sweet potato    squash, winter    cooked beans, peas, lentils (add 1 meat exchange)    1 slice    1 (6")    4-6 (3/4 oz)    1/2 " cup    3/4 cup    1/2 cup    1/3 cup   1/2 cup    1 small light bag   1 (3 oz)    1/2 cup    1/3 cup    1 cup    1/2 cup  Most starches are a good source of B vitamins    Choose whole grain foods such as 100% whole wheat bread and flour, brown rice, tortillas, etc. for nutrients and fiber.    Combine beans (starch & meat) with grains (starch) for their complimentary proteins and fiber    Combine grains (starch) with milk (milk) or cheese (meat) to compliment proteins     2   FRUIT 60 CALORIES PER SERVING 15g CARB     fresh fruit    banana   melon (cubes)    berries   canned fruit    dried fruit     1 small    1/2   ½ cup    ¾ cup   ½ cup    ¼ cup     Choose whole fruits for fiber    No fruit juices   2   DAIRY  CALORIES PER SERVING 12g CARB, 8g PROTEIN, 0-8g FAT     milk    yogurt   Protein soy or almond milk 1 cup   1 cup   1 cup  Use unsweetened almond or soy milk with added protein   Avoid chocolate or flavored milk   Avoid yogurt with more than 8 gms of sugar. Gms of protein should be higher than grams of sugar               1   MEAT AND SUBSTITUES  CALORIES PER SERVING 7g Protein, 0-13g FAT     Seafood and fish    cheese    cottage cheese    egg    peanut butter    tofu or tempeh   cooked beans, peas, lentils (add 1 starch)   Quinoa (add 1 starch)    Nuts and seeds (½ serving protein + 1 fat)   Nutritional yeast   Morning Star grillers  1 oz      1 oz   1/4 cup      1    1.5 Tbsp    4 oz (1/2 cup)    1/2 cup               ¼ cup             2 tablespoons     1 annika or ½ cup       Choose fish, seafood and lower fat cheeses   Limit frying or adding fat.      8   FATS 45 CALORIES PER SERVING      oil    mayonnaise    cream cheese    salad dressing    peanuts    avocado    butter or margarine     1 tsp    1 tsp    1 Tbsp    1 Tbsp    10    1/8    1 tsp  Eat less fat.    Eat less saturated fat such as animal fat found in fatter  meat, cheese, and butter. Also eat less hydrogenated fat.      2-3   VEGETABLES 25 CALORIES PER SERVING 5 g CARB, 2g PROTEIN     raw vegetables    cooked vegetables    tomato or vegetable juice  1 cup    1/2 cup      1/2 cup  Choose dark green leafy and deep yellow vegetables such as spinach, zuchinni, squash, mushrooms, cauliflower ,broccoli, carrots, and peppers.   Unlimited       VEGETARIAN 1000 CALORIE MEAL PLAN   Eat 3 small meals per day with 1-2 small snacks to keep you full throughout the day   Aim for at least 15 gms of protein at breakfast, at least 25 grams at lunch and at least 25 grams of protein at dinner.  Snacks should contain at least 5 grams of protein   Limit starches to 1 serving per meal or snack.  Keep your carbs whole grain or whole wheat   Limit your intake of refined sugar including sugary beverages ie sweet tea, lemonade, fruit punch             Fruit Protein Dairy Starch Fats Calories   Breakfast 1 2  1  370   Lunch  3  1 1 290   Dinner 1 3  1 1 315   Snack   1   120   Total 2 8 1 3 2 1085     Sample breakfasts:   2 scrambled eggs (use spray), 1 cup Protein Silk soy milk, 1 slice whole wheat toast, 1 small orange   Omelet made with 1 egg, 1-ounce low fat cheese and non-starchy veggies, 1 low fat plain yogurt with ½ banana   Plain yogurt with ¾ cup unsweetened cold cereal and ½ cup fresh fruit salad, 2 hardboiled eggs  Sample lunches:   ½ whole wheat bagel topped with 3 ounces of low fat cheese melted in oven with a wild green salad with 1 TBSP dressing   ¾ cup cooked beans with 6 whole grain crackers, 10 roasted peanuts   ½ medium baked potato with 3 ounces of low fat cheddar cheese and 1 TBSP  sour cream   1 small wheat tortilla brushed with 1 tsp olive oil then brushed with pizza sauce and 4 ounces low fat cheese, sliced mushrooms and green peppers broiled until cheese is melted.  ½ cup chopped melon    Sample Dinners:   4 ounces of tuna pan seared in 1 tsp olive oil,  ½ baked small sweet potato and 2 small plums   ½ cup chick peas, 1 cup cauliflower sauteed with 1 tbsp chopped onion, 1 tsp oil, and 2 tsp shea powder. Add low sodium vegetable stock to desired consistency. Serve with ½ cup brown rice   Red beans seasoned with onions and bell peppers served over cauliflower rice   1 cup cooked green or brown lentils served with ½ cup cooked quinoa and chopped tomatoes and cucumbers and 1 tbsp feta cheese  Sample Snacks:   1 cup of Protein Silk Soy milk with 3 squares angel crackers   3/4 ounce Triscuits with 1 ounce cubed cheese   Chobani Triple Zero yogurt with ¾ cup unsweetened cereal   ½ cup edamame (shelled)

## 2020-04-21 DIAGNOSIS — Z01.84 ANTIBODY RESPONSE EXAMINATION: ICD-10-CM

## 2020-04-28 ENCOUNTER — LAB VISIT (OUTPATIENT)
Dept: LAB | Facility: HOSPITAL | Age: 25
End: 2020-04-28
Attending: INTERNAL MEDICINE
Payer: COMMERCIAL

## 2020-04-28 DIAGNOSIS — Z01.84 ANTIBODY RESPONSE EXAMINATION: ICD-10-CM

## 2020-04-28 LAB — SARS-COV-2 IGG SERPL QL IA: NEGATIVE

## 2020-04-28 PROCEDURE — 86769 SARS-COV-2 COVID-19 ANTIBODY: CPT

## 2020-04-28 PROCEDURE — 36415 COLL VENOUS BLD VENIPUNCTURE: CPT

## 2020-05-11 ENCOUNTER — OFFICE VISIT (OUTPATIENT)
Dept: BARIATRICS | Facility: CLINIC | Age: 25
End: 2020-05-11
Payer: COMMERCIAL

## 2020-05-11 DIAGNOSIS — E66.3 OVERWEIGHT (BMI 25.0-29.9): Primary | ICD-10-CM

## 2020-05-11 PROCEDURE — 99212 PR OFFICE/OUTPT VISIT, EST, LEVL II, 10-19 MIN: ICD-10-PCS | Mod: 95,,, | Performed by: INTERNAL MEDICINE

## 2020-05-11 PROCEDURE — 99212 OFFICE O/P EST SF 10 MIN: CPT | Mod: 95,,, | Performed by: INTERNAL MEDICINE

## 2020-05-11 RX ORDER — PHENTERMINE HYDROCHLORIDE 37.5 MG/1
37.5 TABLET ORAL
Qty: 30 TABLET | Refills: 2 | Status: SHIPPED | OUTPATIENT
Start: 2020-06-01 | End: 2020-07-01

## 2020-05-11 NOTE — PROGRESS NOTES
Subjective:       Patient ID: Rhea Gilman is a 25 y.o. female.    Chief Complaint: No chief complaint on file.    The patient location is: [facility].   The chief complaint leading to consultation is: Patient presents with:  Follow-up     Visit type: audiovisual  Total time spent with patient: 10 min  Each patient to whom he or she provides medical services by telemedicine is:  (1) informed of the relationship between the physician and patient and the respective role of any other health care provider with respect to management of the patient; and (2) notified that he or she may decline to receive medical services by telemedicine and may withdraw from such care at any time.    Notes:     Pt here today for follow-up. Has lost lbs. Started 1000 raymond diet and diethylpropion last filled 5/5/2020, [facility] before that. States she has been eating more recently. Her boyfriend is in town.  SHe states the diethylpropion works ok. She does feel that phentermine worked.       Prev 173#. Current weight 176# /58. HR 56# O2 99%. States she was as low as 165# at a point since last OV.     Review of Systems   Constitutional: Negative for chills and fever.   Respiratory: Negative for shortness of breath.         + snores.    Cardiovascular: Negative for chest pain, palpitations and leg swelling.   Gastrointestinal: Negative for constipation and diarrhea.        Denies GERD   Genitourinary:        Using condoms for contraception.      Neurological: Negative for dizziness and light-headedness.   Psychiatric/Behavioral: Negative for dysphoric mood. The patient is not nervous/anxious.        Objective:     There were no vitals taken for this visit.   Physical Exam   Constitutional: She is oriented to person, place, and time. She appears well-developed and well-nourished. No distress.   overweight   HENT:   Head: Normocephalic and atraumatic.   Pulmonary/Chest: Effort normal.   Neurological: She is alert and oriented to person,  place, and time.   Psychiatric: She has a normal mood and affect. Her behavior is normal. Judgment normal.   Vitals reviewed.      Assessment:       1. Overweight (BMI 25.0-29.9)        Plan:         Rhea was seen today for follow-up.    Diagnoses and all orders for this visit:    Overweight (BMI 25.0-29.9)  -     phentermine (ADIPEX-P) 37.5 mg tablet; Take 1 tablet (37.5 mg total) by mouth before breakfast.         Patient warned of common side effects of phentermine including anxiety, insomnia, palpitations and increased blood pressure. It was also explained that it is for short-term usage along with diet and exercise, and that stopping the medication without making lifestyle changes will result in regain of weight. Patient states understanding.     Weight loss medications are controlled substances.  They require routine follow up. Prescription or pills that are lost or destroyed will not be replaced.         Start phentermine with 1/2 pill a day for at least 1 week to see if that will control your appetite.  Go up to a full pill when needed.           1000 calories a day  40%  carbs(100 grams)  30% protein (75 grams)  30 % fat (33 grams)  Food diary or calorie tracking Karlene      Exercise -Add some type of resistance training 2-3 days a week. These can be body weight exercises, light weight or elastic bands. [employer] and [employer] are great sources for free work out plans and videos.       Quarantine tips given.

## 2020-05-11 NOTE — PATIENT INSTRUCTIONS
Patient warned of common side effects of phentermine including anxiety, insomnia, palpitations and increased blood pressure. It was also explained that it is for short-term usage along with diet and exercise, and that stopping the medication without making lifestyle changes will result in regain of weight. Patient states understanding.     Weight loss medications are controlled substances.  They require routine follow up. Prescription or pills that are lost or destroyed will not be replaced.       Start phentermine with 1/2 pill a day for at least 1 week to see if that will control your appetite.  Go up to a full pill when needed.           1000 calories a day  40%  carbs(100 grams)  30% protein (75 grams)  30 % fat (33 grams)  Food diary or calorie tracking Karlene      Exercise -Add some type of resistance training 2-3 days a week. These can be body weight exercises, light weight or elastic bands. Perzo and Fantastic.cl are great sources for free work out plans and videos.       [facility]  Tips to Stay on Track During COVID-19      DO DON'T   Keep up with your food log  Maintaining your caloric intake and diet quality is critical for achieving your health and weight loss goals.  Download the [employer] and use [facility] Code 49997 to track food and fluid intake Feel Discouraged - You can do this!  There are a lot of changes happening in our world but don't let them discourage you. Focus on the future and remind yourself of all the work and effort you've put in so far.     Keep protein-rich foods stocked up  buy chicken and turkey in bulk and freeze them, keep dry or canned beans on hand, get the largest quantity of eggs available. Make sure you are getting your required protein intake (between  grams EACH DAY).   Drink fluid during meals or 30 minutes before/after eating  Your regular routine may have changed which may have caused some of your meal or snack times to  change.  keep track of when you consume any liquid and time your meals accordingly. This will also help you make sure you are staying hydrated throughout the day   Continue with your protein drinks or bars  Order online or use grocery delivery service. Always make sure you order more WELL BEFORE you are running low, especially now when deliveries may take longer to arrive.  Remember to check to make sure your protein shakes or bars have 4 gms of sugar or less.     Keep table sugar around  You may be more tempted to add it to your drinks or food if it is visible and easily accessible.   Try new recipes  Use this time to experiment in the kitchen and find some different healthy dishes you enjoy - you can use the nutrition booklet to help guide you.  If you cannot find your copy, please download it from our website @ [email or web address]  Click here to download [facility]'s Nutrition Binder.   Add tough or crunchy foods back into your diet too quickly  Raw veggies are great snack foods but adding them in too quickly after surgery will cause pain and discomfort   Eat your meals slowly and intentionally  You shouldn't have to rush out to be anywhere so really take your time and aim for each meal to last around 20-30 minutes.   Drink sugary/bubbly drinks or alcohol    It may be tempting especially if you are surrounded by others without these limitations, but these beverages will prevent weight loss and cause gastric  pain/discomfort     Listen to your body - try to recognize when you feel full.  Learning your body's signals can be difficult but it is a key step in your weight loss journey. While you are is this process of working on this step, be sure portion out the recommended quantities of foods and meals/snacks to prevent overeating.   Eat your meals using electronics  This is especially difficult at home where your use of computers, phones, and TVs are pretty  much unregulated. Designate a meal spot or spots where there is limited distractions and you can focus on your food - maybe your kitchen table or on an outside porch or deck.   Continue taking vitamins and minerals  Take them at the same time each day and keep a log of when you take your supplements to make sure you don't miss any. These supplements are essential for preventing malnutrition and other health problems that will deter your progress.   'Save' your appetite   You may feel like holding out from food as long as possible so you can eat a large meal later on, but your body needs energy throughout the day in order to properly fuel itself and keep you alert.  Try eating small meals throughout the day - use these meals as mini breaks from work or projects you are doing at home.   Stay active!  It is so important for both your physical and mental health that you get regular physical activity. Even if you can no longer physically go to the gym or to workout classes there are tons of online resources to keep you moving. Incorporate a specific exercise time into your daily home routine and keep a journal of your activity.   Order food delivery or take out   Most places are now offering delivery services, but it can still be difficult to find and choose healthy options. Choose to do a grocery store  or delivery instead- cooking food at home is less expensive then eating out!   Review the resources you've been provided and keep in touch with your healthcare team.  Let them know if you are struggling or experiencing any problems - they are here to help!  Call us to schedule a telehealth visit at [phone] Isolate yourself   It may be called 'social distancing' but that does not mean we can't still connect with one another. Phone calls or group video chats are great ways to keep in touch while staying at home. Any method of getting regular social time with friends and family will help to remind you that  you're not in this alone.     Grocery List    Items to Keep Stocked in Your Kitchen  PROTEINS (Lean)    Eggs  Beans (canned and/or dried)  Skinless chicken/turkey   Tuna/[place] (canned and/or pouch)  Tofu or Tempeh  Clay Veggie Burgers  Fish or shrimp (fresh or frozen)  Ground Beef (90% lean)  Steaks  Chobani Greek Yogurt  Cabot Cottage Cheese  Hummus  Low-fat cheeses (Laughing Cow, Baby Bell, mozzarella string cheese)  Fairlife Non-fat Milk    Vegetables (non-starchy)  *veggies can be fresh or frozen    Broccoli   Cauliflower   Carrots   Onions   Cabbage   Radishes   Zucchini   Okra   Greens   Peppers   Spinach   Turnips   Mushrooms   Tomatoes   Celery   Lettuce   Asparagus  Eggplant   Green Onions   Kale    Fruits  *Fruits can be fresh or frozen    Apples  Oranges  Pears  Kiwi  Melon  Berries  Peaches  Unsweetened Applesauce    *Avoid fruits canned In syrup  *Stick to 1-2 servings of fruit/day   Vitamins    Flintstones Complete  Chewables    Super B-Complex tablets with 50 mg Thiamine    Nature's Way liquid Calcium Citrate + Vit D     Sublingual Vitamin B12   Other    Sugar-free Popsicles    Sugar-free Jello    Crystal Lite    Low Fat Condiments     Decaf Coffee/Tea           Foods to Avoid Having Around the House  Butter/Margarine Cookies Candy Chips  Pretzels Grits  Granola Popcorn Joseph Corn  Bread Alcohol Soda  Pasta  Rice  Cake/Pie Sausage Potatoes Ice Cream                 Tricks to Prevent Emotional Eating During Quarantine      Keep 'trigger foods' out of the house   Keep yourself distracted with work, games, music, or whatever hobby you enjoy. This may be the time to try a new activity!   Try fighting stress with breathing techniques, yoga, meditation, or prayer   Mix up your meals with a variety of dishes   Keep up with your food diary   Call or video chat with a friend or family   Plan your meals for specific time and try for smaller meals throughout the day   Pre-portion all meals and  snacks    Step outside for some fresh air or do a quick exercise activity to reset yourself    *Avoid negative thoughts about yourself - if you have a slip-up, you are not a failure. Forgive yourself and focus on learning from it so you can prevent it for the future              Ways to Stay Active While Staying Inside      Youtube Videos - free exercise and gym classes at any fitness level   Apps -tons of fitness apps are currently offering free trial periods and offer workouts that don't require equipment   Chores around the house, such as cleaning or gardening   Walk up and down the stairs   Video-chat with your regular workout ashtyn and do an online class together   Make a playlist of your favorite fun songs and dance around - no need to worry about knowing any serious dance moves, just jump around get your heart rate up!    While you are limited to working out at home, you may find it easier to do short, mini workouts multiple times a day instead of all at once. Try to still get at least 30 minutes of physical activity in each day!   Physical Health = Mental Health    The health of both your mind and body are equally important -be sure you are taking the time to care for both. It is likely that the current health concerns and quarantine mandates caused significant changes to your normal routine. Although this can feel overwhelming and seem difficult to manage, there are ways you can take to manage these feelings and keep your mental and physical health journey on track. Here are some tips for self-care during quarantine:     Meditate, take deep breaths - find any practice that will help you center yourself.   Move around your house throughout the day. Avoid staying in the same seat or room for too long and try to work in an area of your house that get lots of sunlight if possible.   Get fresh air for a bit every day - being outside is a great way to improve your mood! You don't necessarily have to go  far from your house. You could even just hang out on your porch for a while or take a walk around the block.    Get good sleep and maintain a regular sleep schedule   Connect with others. While we aren't able to physically be with others right now it is still so important to socialize and interact with other people, even if it is being done remotely.    Keep yourself busy. Make a list of tasks that you want to complete around the house, start a new book, do some art projects, try journaling.

## 2020-06-07 ENCOUNTER — HOSPITAL ENCOUNTER (EMERGENCY)
Facility: HOSPITAL | Age: 25
Discharge: HOME | End: 2020-06-08
Attending: EMERGENCY MEDICINE
Payer: COMMERCIAL

## 2020-06-07 DIAGNOSIS — N10 ACUTE PYELONEPHRITIS: Primary | ICD-10-CM

## 2020-06-07 LAB
B-HCG UR QL: NEGATIVE
BACTERIA #/AREA URNS AUTO: ABNORMAL /HPF
BILIRUB UR QL STRIP: NEGATIVE
BUN SERPL-MCNC: 10 MG/DL (ref 6–30)
CHLORIDE SERPL-SCNC: 104 MMOL/L (ref 95–110)
CLARITY UR REFRACT.AUTO: ABNORMAL
COLOR UR AUTO: YELLOW
CREAT SERPL-MCNC: 0.7 MG/DL (ref 0.5–1.4)
CTP QC/QA: YES
GLUCOSE SERPL-MCNC: 96 MG/DL (ref 70–110)
GLUCOSE UR QL STRIP: NEGATIVE
HCT VFR BLD CALC: 42 %PCV (ref 36–54)
HGB UR QL STRIP: ABNORMAL
KETONES UR QL STRIP: NEGATIVE
LEUKOCYTE ESTERASE UR QL STRIP: ABNORMAL
MICROSCOPIC COMMENT: ABNORMAL
NITRITE UR QL STRIP: POSITIVE
PH UR STRIP: 6 [PH] (ref 5–8)
POC IONIZED CALCIUM: 1.09 MMOL/L (ref 1.06–1.42)
POC TCO2 (MEASURED): 28 MMOL/L (ref 23–29)
POTASSIUM BLD-SCNC: 4 MMOL/L (ref 3.5–5.1)
PROT UR QL STRIP: NEGATIVE
RBC #/AREA URNS AUTO: 1 /HPF (ref 0–4)
SAMPLE: NORMAL
SODIUM BLD-SCNC: 139 MMOL/L (ref 136–145)
SP GR UR STRIP: 1.01 (ref 1–1.03)
SQUAMOUS #/AREA URNS AUTO: 3 /HPF
URN SPEC COLLECT METH UR: ABNORMAL
WBC #/AREA URNS AUTO: 54 /HPF (ref 0–5)

## 2020-06-07 PROCEDURE — 99284 EMERGENCY DEPT VISIT MOD MDM: CPT | Mod: ,,, | Performed by: PHYSICIAN ASSISTANT

## 2020-06-07 PROCEDURE — 25000003 PHARM REV CODE 250: Performed by: PHYSICIAN ASSISTANT

## 2020-06-07 PROCEDURE — 96374 THER/PROPH/DIAG INJ IV PUSH: CPT

## 2020-06-07 PROCEDURE — 87186 SC STD MICRODIL/AGAR DIL: CPT

## 2020-06-07 PROCEDURE — 63600175 PHARM REV CODE 636 W HCPCS: Performed by: PHYSICIAN ASSISTANT

## 2020-06-07 PROCEDURE — 87077 CULTURE AEROBIC IDENTIFY: CPT

## 2020-06-07 PROCEDURE — 81001 URINALYSIS AUTO W/SCOPE: CPT

## 2020-06-07 PROCEDURE — 99284 PR EMERGENCY DEPT VISIT,LEVEL IV: ICD-10-PCS | Mod: ,,, | Performed by: PHYSICIAN ASSISTANT

## 2020-06-07 PROCEDURE — 87086 URINE CULTURE/COLONY COUNT: CPT

## 2020-06-07 PROCEDURE — 99284 EMERGENCY DEPT VISIT MOD MDM: CPT | Mod: 25

## 2020-06-07 PROCEDURE — 80047 BASIC METABLC PNL IONIZED CA: CPT

## 2020-06-07 PROCEDURE — 81025 URINE PREGNANCY TEST: CPT | Performed by: PHYSICIAN ASSISTANT

## 2020-06-07 PROCEDURE — 96361 HYDRATE IV INFUSION ADD-ON: CPT

## 2020-06-07 PROCEDURE — 96375 TX/PRO/DX INJ NEW DRUG ADDON: CPT

## 2020-06-07 PROCEDURE — 87088 URINE BACTERIA CULTURE: CPT

## 2020-06-07 RX ORDER — ONDANSETRON 2 MG/ML
8 INJECTION INTRAMUSCULAR; INTRAVENOUS
Status: COMPLETED | OUTPATIENT
Start: 2020-06-07 | End: 2020-06-07

## 2020-06-07 RX ADMIN — SODIUM CHLORIDE 1000 ML: 0.9 INJECTION, SOLUTION INTRAVENOUS at 11:06

## 2020-06-07 RX ADMIN — ONDANSETRON 8 MG: 2 INJECTION INTRAMUSCULAR; INTRAVENOUS at 11:06

## 2020-06-08 VITALS
HEIGHT: 64 IN | RESPIRATION RATE: 16 BRPM | BODY MASS INDEX: 28.17 KG/M2 | SYSTOLIC BLOOD PRESSURE: 130 MMHG | DIASTOLIC BLOOD PRESSURE: 68 MMHG | TEMPERATURE: 99 F | OXYGEN SATURATION: 100 % | WEIGHT: 165 LBS | HEART RATE: 98 BPM

## 2020-06-08 PROCEDURE — 63600175 PHARM REV CODE 636 W HCPCS: Performed by: PHYSICIAN ASSISTANT

## 2020-06-08 RX ORDER — CEFTRIAXONE 1 G/1
1 INJECTION, POWDER, FOR SOLUTION INTRAMUSCULAR; INTRAVENOUS
Status: COMPLETED | OUTPATIENT
Start: 2020-06-08 | End: 2020-06-08

## 2020-06-08 RX ORDER — ONDANSETRON 4 MG/1
8 TABLET, ORALLY DISINTEGRATING ORAL EVERY 8 HOURS PRN
Qty: 15 TABLET | Refills: 0 | OUTPATIENT
Start: 2020-06-08 | End: 2021-03-04

## 2020-06-08 RX ORDER — CEPHALEXIN 500 MG/1
500 CAPSULE ORAL EVERY 12 HOURS
Qty: 20 CAPSULE | Refills: 0 | Status: SHIPPED | OUTPATIENT
Start: 2020-06-08 | End: 2020-06-08 | Stop reason: SDUPTHER

## 2020-06-08 RX ORDER — CEPHALEXIN 500 MG/1
500 CAPSULE ORAL EVERY 12 HOURS
Qty: 20 CAPSULE | Refills: 0 | Status: SHIPPED | OUTPATIENT
Start: 2020-06-08 | End: 2020-06-18

## 2020-06-08 RX ORDER — ONDANSETRON 4 MG/1
8 TABLET, ORALLY DISINTEGRATING ORAL EVERY 8 HOURS PRN
Qty: 15 TABLET | Refills: 0 | Status: SHIPPED | OUTPATIENT
Start: 2020-06-08 | End: 2020-06-08 | Stop reason: SDUPTHER

## 2020-06-08 RX ADMIN — CEFTRIAXONE SODIUM 1 G: 1 INJECTION, POWDER, FOR SOLUTION INTRAMUSCULAR; INTRAVENOUS at 12:06

## 2020-06-08 NOTE — ED NOTES
Discharge instructions, diagnosis, medications, and follow up discussed with patient. Patient verbalized understanding. All questions and concerns answered. No needs expressed at the time. Pt is awake, alert and oriented with no acute distress noted. Respirations even and unlabored. Ambulatory out of ED with mask in place.

## 2020-06-08 NOTE — ED PROVIDER NOTES
Encounter Date: 6/7/2020       History     Chief Complaint   Patient presents with    Emesis     Pt reports N/V since friday and unable to hold down food and liquids. Pt denies abdominal pain. Pt states having headache to left side of head. Pt feeling dizzy and weak.      25-year-old female with anemia, history of AFib and migraines presents for nausea, vomiting and generalized weakness for 2 days.  Her symptoms started after eating crawfish on Friday.  States that she has been unable to keep anything down.  Denies any uncooked or raw food at the crawfish well.  She reports associated foul-smelling urine my lightheadedness and fatigue.  She states I am so weak, I feel like I am going to fall out. She denies dysuria, hematuria, increased urinary frequency, flank pain or abdominal pain.  No changes in bowel movements, vaginal discharge, chest pain, shortness of breath or palpitations.  No prior abdominal surgeries.  LMP last week.        Review of patient's allergies indicates:   Allergen Reactions    Amoxicillin Hives     Past Medical History:   Diagnosis Date    Anemia     Atrial fibrillation     converted [facility] at [facility]    Coronary artery disease     Migraine headache      History reviewed. No pertinent surgical history.  History reviewed. No pertinent family history.  Social History     Tobacco Use    Smoking status: Light Tobacco Smoker     Types: Cigarettes    Smokeless tobacco: Never Used    Tobacco comment: 1 pack 3 days   Substance Use Topics    Alcohol use: No    Drug use: No     Review of Systems   Constitutional: Positive for fatigue. Negative for fever.   HENT: Negative for sore throat.    Respiratory: Negative for shortness of breath.    Cardiovascular: Negative for chest pain.   Gastrointestinal: Positive for nausea and vomiting. Negative for abdominal distention, abdominal pain, anal bleeding, blood in stool, constipation, diarrhea and rectal pain.    Genitourinary: Negative for difficulty urinating, dysuria, frequency, hematuria, pelvic pain, urgency, vaginal bleeding and vaginal discharge.   Musculoskeletal: Negative for back pain and myalgias.   Skin: Negative for rash.   Neurological: Positive for weakness. Negative for numbness.   Hematological: Does not bruise/bleed easily.       Physical Exam     Initial Vitals [06/07/20 2235]   BP Pulse Resp Temp SpO2   125/76 105 18 98.1 °F (36.7 °C) 100 %      MAP       --         Physical Exam    Nursing note and vitals reviewed.  Constitutional: She appears well-developed and well-nourished.   HENT:   Head: Normocephalic and atraumatic.   Eyes: EOM are normal. Pupils are equal, round, and reactive to light.   Neck: Normal range of motion. Neck supple.   Cardiovascular: Normal rate, regular rhythm, normal heart sounds and intact distal pulses. Exam reveals no gallop and no friction rub.    No murmur heard.  Pulmonary/Chest: Breath sounds normal. No respiratory distress. She has no wheezes. She has no rhonchi. She has no rales. She exhibits no tenderness.   Abdominal: Soft. Bowel sounds are normal. She exhibits no distension and no mass. There is no tenderness. There is no rebound and no guarding.   Musculoskeletal: Normal range of motion.   Neurological: She is alert and oriented to person, place, and time.   Skin: Skin is warm and dry.   Psychiatric: She has a normal mood and affect.         ED Course   Procedures  Labs Reviewed   URINALYSIS, REFLEX TO URINE CULTURE - Abnormal; Notable for the following components:       Result Value    Appearance, UA Hazy (*)     Occult Blood UA 1+ (*)     Nitrite, UA Positive (*)     Leukocytes, UA 2+ (*)     All other components within normal limits    Narrative:     Preferred Collection Type->Urine, Clean Catch   URINALYSIS MICROSCOPIC - Abnormal; Notable for the following components:    WBC, UA 54 (*)     Bacteria Many (*)     All other components within normal limits     Narrative:     Preferred Collection Type->Urine, Clean Catch   CULTURE, URINE   POCT URINE PREGNANCY   ISTAT PROCEDURE   ISTAT CHEM8          Imaging Results    None          Medical Decision Making:   History:   Old Medical Records: I decided to obtain old medical records.  Old Records Summarized: records from previous admission(s).       <> Summary of Records: Most recent ED visit 07/2019 for diarrhea and lightheadedness  Initial Assessment:   25-year-old female presenting for nausea, vomiting and generalized weakness.  Her vitals are normal and she is well-appearing.  Abdomen is soft and nontender with normoactive bowel sounds.  Differential Diagnosis:   Dehydration  Electrolyte derangement  Gastritis  Pregnancy  UTI  Clinical Tests:   Lab Tests: Ordered and Reviewed  ED Management:  Will check labs, give Zofran, fluids and reassess.    Labs notable for nitrite positive UTI.  Otherwise unremarkable.  She reports improvement of her symptoms after therapy and is able to tolerate p.o. Intake. Given the patient's systemic symptoms of nausea, vomiting and weakness will for pyelonephritis with loading dose of ceftriaxone and discharge with Keflex and Zofran.  I instructed the patient to follow up with her PCP and return to the ED for any worsening symptoms. Stressed the importance of follow-up, strict ED return precautions given.  Patient voiced understanding and is comfortable with discharge.                    ED Course as of Jun 08 0005   Sun Jun 07, 2020   2320 POCT urine pregnancy [CC]   2352 ISTAT PROCEDURE [CC]      ED Course User Index  [CC] Elisa Galvez PA-C                Clinical Impression:       ICD-10-CM ICD-9-CM   1. Acute pyelonephritis N10 590.10         Disposition:   Disposition: Discharged  Condition: Stable                        Elisa Galvez PA-C  06/08/20 0128

## 2020-06-08 NOTE — DISCHARGE INSTRUCTIONS
Diagnosis:  Pyelonephritis (kidney infection)    You have a urinary tract infection which has spread to your body causing nausea, vomiting and weakness.  You were given antibiotics in the emergency department as well as nausea medicine and fluids.  I am prescribing a course of antibiotics, please finish the entire course of antibiotics even if you feel better.  I am also prescribing nausea medicine that you can take as needed.    Make sure to get plenty of rest and drink plenty of fluids.  Please schedule a follow-up appointment with your primary care doctor within the week.  If you start to feel worse, especially with high fever, severe abdominal pain, persistent vomiting or worsening weakness please return to the emergency department.

## 2020-06-08 NOTE — ED TRIAGE NOTES
"Rhea Gilman, a 25 y.o. female presents to the ED w/ complaint of N/V since Friday. States unable to tolerate anything PO. Pt denies abdominal pain. Denies fevers. Reports one episode of chills/body aches. States "I feel extremely weak and dizzy". Denies cough, SOB. Denies chest pain.     Triage note:  Chief Complaint   Patient presents with    Emesis     Pt reports N/V since friday and unable to hold down food and liquids. Pt denies abdominal pain. Pt states having headache to left side of head. Pt feeling dizzy and weak.      Review of patient's allergies indicates:   Allergen Reactions    Amoxicillin Hives     Past Medical History:   Diagnosis Date    Anemia     Atrial fibrillation     converted with calcium channel blockers at [facility]    Coronary artery disease     Migraine headache      "

## 2020-06-09 LAB — BACTERIA UR CULT: ABNORMAL

## 2020-11-04 ENCOUNTER — PATIENT MESSAGE (OUTPATIENT)
Dept: BARIATRICS | Facility: CLINIC | Age: 25
End: 2020-11-04

## 2020-11-04 RX ORDER — POLYETHYLENE GLYCOL 3350 17 G/17G
17 POWDER, FOR SOLUTION ORAL DAILY
Qty: 1530 G | Refills: 0 | Status: SHIPPED | OUTPATIENT
Start: 2020-11-04 | End: 2020-12-04

## 2020-11-04 RX ORDER — POLYETHYLENE GLYCOL 3350 17 G/17G
17 POWDER, FOR SOLUTION ORAL DAILY
Qty: 1530 G | Refills: 0 | Status: SHIPPED | OUTPATIENT
Start: 2020-11-04 | End: 2020-11-04 | Stop reason: SDUPTHER

## 2020-11-05 ENCOUNTER — PATIENT MESSAGE (OUTPATIENT)
Dept: BARIATRICS | Facility: CLINIC | Age: 25
End: 2020-11-05

## 2020-11-05 NOTE — TELEPHONE ENCOUNTER
Spoke with Pharmacist in regards to initiating transferring for rx(Glycolax) from [facility]. pharmacist stated medical assistant can not transfer or call in a prescription. Informed pharmacist I am to assist the provider with these duties , I have the provider's Sabiha and Npi to do so.     Pharmacist stated she would like to speak with clinic manager or nurse. Transferred call to supervisor Maria Esther. Maria Esther asked if pharmacist can tranfer rx(Glycolax) from [facility]. Pharmacist stated the pt is suppose to transferred this medication not clinic staff. Pharmacist hung up on Maria Esther after follow up questions asked.    Spoke with pt in regards to rx(Glycolax) status, Pt transferred medication successfully.

## 2020-12-14 ENCOUNTER — OFFICE VISIT (OUTPATIENT)
Dept: BARIATRICS | Facility: CLINIC | Age: 25
End: 2020-12-14
Payer: COMMERCIAL

## 2020-12-14 VITALS
DIASTOLIC BLOOD PRESSURE: 72 MMHG | SYSTOLIC BLOOD PRESSURE: 118 MMHG | WEIGHT: 181 LBS | HEART RATE: 82 BPM | RESPIRATION RATE: 18 BRPM | OXYGEN SATURATION: 99 % | BODY MASS INDEX: 31.07 KG/M2

## 2020-12-14 DIAGNOSIS — E66.9 OBESITY, CLASS I, BMI 30.0-34.9 (SEE ACTUAL BMI): Primary | ICD-10-CM

## 2020-12-14 DIAGNOSIS — G43.009 MIGRAINE WITHOUT AURA AND WITHOUT STATUS MIGRAINOSUS, NOT INTRACTABLE: ICD-10-CM

## 2020-12-14 PROCEDURE — 3008F PR BODY MASS INDEX (BMI) DOCUMENTED: ICD-10-PCS | Mod: CPTII,S$GLB,, | Performed by: INTERNAL MEDICINE

## 2020-12-14 PROCEDURE — 99999 PR PBB SHADOW E&M-EST. PATIENT-LVL III: ICD-10-PCS | Mod: PBBFAC,,, | Performed by: INTERNAL MEDICINE

## 2020-12-14 PROCEDURE — 99214 OFFICE O/P EST MOD 30 MIN: CPT | Mod: S$GLB,,, | Performed by: INTERNAL MEDICINE

## 2020-12-14 PROCEDURE — 99214 PR OFFICE/OUTPT VISIT, EST, LEVL IV, 30-39 MIN: ICD-10-PCS | Mod: S$GLB,,, | Performed by: INTERNAL MEDICINE

## 2020-12-14 PROCEDURE — 1126F AMNT PAIN NOTED NONE PRSNT: CPT | Mod: S$GLB,,, | Performed by: INTERNAL MEDICINE

## 2020-12-14 PROCEDURE — 1126F PR PAIN SEVERITY QUANTIFIED, NO PAIN PRESENT: ICD-10-PCS | Mod: S$GLB,,, | Performed by: INTERNAL MEDICINE

## 2020-12-14 PROCEDURE — 3008F BODY MASS INDEX DOCD: CPT | Mod: CPTII,S$GLB,, | Performed by: INTERNAL MEDICINE

## 2020-12-14 PROCEDURE — 99999 PR PBB SHADOW E&M-EST. PATIENT-LVL III: CPT | Mod: PBBFAC,,, | Performed by: INTERNAL MEDICINE

## 2020-12-14 RX ORDER — TOPIRAMATE 25 MG/1
25 TABLET ORAL 2 TIMES DAILY
Qty: 60 TABLET | Refills: 3 | Status: SHIPPED | OUTPATIENT
Start: 2020-12-14 | End: 2021-04-26

## 2020-12-14 RX ORDER — DIETHYLPROPION HYDROCHLORIDE 75 MG/1
75 TABLET, EXTENDED RELEASE ORAL DAILY
Qty: 30 TABLET | Refills: 2 | Status: SHIPPED | OUTPATIENT
Start: 2020-12-14 | End: 2021-04-26

## 2020-12-14 RX ORDER — PHENTERMINE HYDROCHLORIDE 37.5 MG/1
37.5 TABLET ORAL
COMMUNITY
Start: 2020-10-01 | End: 2020-12-14

## 2020-12-14 NOTE — PATIENT INSTRUCTIONS
Patient was informed that topiramate is used for migraine prevention and seizures. Weight loss is a common side effect that is well documented. S/he understands this. S/he was informed of the potential side effects such as serious and possibly fatal rash in which case the medication should be discontinued immediately. Paresthesias, forgetfulness, fatigue, kidney stones, GI symptoms, and changes in lab values such as electrolytes, blood counts and kidney function.    Start topiramate  in the evening for 1 week, then morning and evening.       Patient warned of common side effects of diethylpropion including anxiety, insomnia, palpitations and increased blood pressure. It was also explained that it is for short-term usage along with diet and exercise, and that stopping the medication without making lifestyle changes will result in regain of weight. Patient states understanding.    Weight loss medications are controlled substances.  They require routine follow up. Prescription or pills that are lost or destroyed will not be replaced.         1000 calories a day  40%  carbs(100 grams)  30% protein (75 grams)- 50 g minimum!  30 % fat (33 grams)  Food diary or calorie tracking Karlene      Exercise -Add some type of resistance training 2-3 days a week. These can be body weight exercises, light weight or elastic bands. [employer] are great sources for free work out plans and videos.       5-Day Menu Plan: 800-1000 Calories    DAY 1     Breakfast  1 boiled egg  Small apple    Snack  ¼ cup almonds    Lunch  Morning star krissy  1 cup green beans    Dinner  3 oz salmon  1 cup cooked carrots    Snack  SF jello    DAY 2    Breakfast  2 eggs with 2 tbsp salsa    Snack  6 oz greek yogurt  ½ cup Peaches canned (in its own juice)    Lunch  [place]:Tuna and mustard   Pear cup (no sugar added)    Dinner  Baked fish  1 cup cooked yellow squash    Snack  SF popsicle            DAY 3    Breakfast   Protein drink: 1 scoop whey  powder + 6oz soy milk    Snack  ¼ cup almonds    Lunch  Tuna Salad: 3oz canned tuna in water, 1 egg, and 1 tsp light willoughby)  Pineapple cup (no sugar added)    Dinner  Baked shrimp  1 cup grilled eggplant    Snack  8oz Chocolate Soy Milk      DAY 4    Breakfast  2 eggs, morning star krissy    Snack  1 cheese stick    Lunch    Snack  1/4 cup almonds  Peach cup (no sugar added)    Dinner  3oz baked fish  1 cup cooked green beans          DAY 5    Breakfast  Protein drink: 1 scoop whey powder + 6oz soy milk    Snack   ¼ cup almonds  1 apple    Lunch  1 cup tuna salad: (3oz canned tuna in water, 1 egg, 1 tsp light willoughby)    Snack  3 oz greek yogurt  Fruit cup (no sugar added)    Dinner  Omelet: 2 eggs, grilled shrimp, bell pepper and onion          Helpful tips to survive the holidays:  - Dont save yourself for the meal: Make sure you continue to eat high protein small meals every 3-4 hours to ensure to do not become over-hungry. Avoid temptation by not showing up to a holiday party or gathering hungry.   - Plan ahead. Bring a dish to a party if you know there may not be an appropriate option.   - Choose sugar-free drinks: Stick to water or other sugar-free beverages and make sure you are getting 6-8 cups of fluid each day (but not with meals!). Avoid alcohol, carbonated beverages, and high-fat/high-sugar beverages like hot chocolate and eggnog. Try sugar-free hot cocoa made with skim milk or water, or sugar-free spiced tea to add some holiday flair to your beverage (see sugar-free mulled cider recipe below)  - Take your time: Eat mindfully. Dont graze on food throughout the day. Sit down to enjoy your small meals. Chew slowly and thoroughly. Cut your food into small pieces before eating.  - Listen to your body: Stop eating as soon as you feel full. Do not feel pressured to try certain (or all) foods or to eat all of the food on your plate. Listen to your hunger cues.   - REMEMBER: Make your holidays about spending time  with family and friends instead of focusing gatherings around food.  - Keep up your exercise routine: Make sure you continue to get regular exercise throughout the holiday season. Encourage friends and family to be active by taking a walk together after a meal, to look at holiday lights, or to window-shop.    Good Holiday Meal Options:  - Roasted Turkey, NO skin. Use low sodium broth instead of gravy.   - Stuffed Bell Peppers made WITHOUT bread crumbs or Rice. Try using parmesan cheese instead  - Gumbo, NO rice. Try picking out mostly the meat/seafood and vegetables with little broth.   - Green Bean Casserole made with 98% fat free cream of mushroom soup and crushed almonds/pecans instead of fried onions  - Side salad w/ low fat dressing. Try a different kind of salad maybe use Kale or spinach.   - Roasted non-starchy vegetables like brussel sprouts, broccoli, green beans, zucchini, butternut squash, cauliflower  - Cauliflower Mash (steam or roast cauliflower, puree w/ low fat cheese, dash of fat free milk and 2-3 sprays of I cant believe its not butter spray. Add garlic powder and black pepper to season). Use Low sodium broth instead of gravy.   - Try Loaded Cauliflower Mash (Make cauliflower like above cauliflower mash. Top with diced turkey hill, ¼ cup low fat cheddar cheese and bake @ 350* F for 5-10 minutes, until cheese is melted. Top with minced chives, black pepper and garlic to taste).   - Homemade cranberry sauce using Splenda or another alternative sweetener. Boil fresh cranberries and add splenda to taste. Boil until cranberries break open and then simmer until it reaches the consistency you want (less time for more watery sauce and simmer for longer to create a thicker sauce).   - Deviled eggs: make using low fat [place]oughby, mustard, DILL relish (not sweet relish).   - Vegetable tray w/ Greek yogurt Ranch Dip. Mix 1 packet of [place] ranch dip mix w/ 16 oz low fat plain greek yogurt.     Good Holiday  Dessert Options:  - High protein Pumpkin Cheesecake (see recipe below)  - Pumpkin Whip (see recipe below)  - Quest Apple Pie or Cinnamon Roll flavored protein bar (warm in microwave for 10-15 seconds)  - Eggnog Protein shake (see recipe below)  - Fresh fruit w/ low fat cheese  - Sugar-free Jello Parfaits. Layer Red and Green sugar-free jello in cups and top w/ 2 tbsp Sugar-free cool-whip    Pumpkin Cheesecake    8 ounces fat free cream cheese, softened   2 scoops of vanilla protein powder (<4 g sugar per serving)   ¼ tsp Fine salt   2 eggs, at room temperature   1/3 cup fat free sour cream  1/3 cup fat free half and half  1 15 -ounce can pure pumpkin puree   1 tablespoon pumpkin pie spice, plus more for dusting   Unsalted nuts, crushed  *Add splenda to taste    Directions     1. Preheat the oven to 300 degrees F. Line 18 muffin cups with paper liners. Sprinkle 1 tsp crushed unsalted nuts at the bottom of each of muffin cup liner.     2. In a large bowl, beat the cream cheese, vanilla protein powder and 1/4 teaspoon fine salt on medium-high speed until smooth and creamy, 2 to 3 minutes. Scrape down the sides, reduce speed to low and beat in the eggs, 1 at a time, until combined. Beat in 1/3 cup fat free sour cream and fat free half and half. Stir in the pumpkin puree and pumpkin pie spice until smooth. Divide evenly among cookie-lined paper cups, filling almost all the way to the top.     3. Bake until the filling is just set, 40 to 45 minutes. A sharp knife inserted into the center will come out moist, but clean. Cool completely in tins on a wire rack. Refrigerate until cold, 4 hours, or overnight. Top with a dusting of pumpkin pie spice.    Recipe altered from the following recipe: http://www.[employer]/recipes/food-network-joselito/mini-pumpkin-cheesecakes-recipe.print.html?oc=linkback    Pumpkin Whip    Box of sugar-free vanilla pudding  Can of pumpkin puree  Pumpkin Pie spice (sprinkle to taste)  ½ cup of  sugar-free Cool Whip    Directions:  Make sugar-free pudding according to package directions using fat free or 1% milk. Stir in pumpkin and cool whip. Add pumpkin pie spice to taste.     Egg Nog Protein shake    8 oz skim or 1% milk  1 scoop vanilla protein powder  1 tbsp sugar-free vanilla pudding mix  ½ tsp butter flavor extract  ½ tsp rum extract  ½ tsp cinnamon     Shake together or blend with ice and serve.     Sugar-Free Mulled Cider    3 oz diet cran-apple juice  6 oz water  1 packet sugar-free apple cider mix  ½ tsp apple pie spice  ½ tsp butter flavor extract  1 tbsp Sugar-free Syrup    Mix together. Warm if needed and serve w/ orange wedge and cinnamon stick.

## 2020-12-14 NOTE — PROGRESS NOTES
Subjective:       Patient ID: Rhea Gilman is a 25 y.o. female.    Chief Complaint: Follow-up    CC:Obesity, currently overweight.     Pt here today for follow-up. Has gained 8lbs (-0.5 lbs muscle. +7.1 lbs fat). Has been on 1000 raymond diet and last on phentermine filled 10/1/2020. She does state she was eating some fast food and soda. Her boyfriend is in town now, but she is staying focused. She also plans to resume exercise.     New BMR: 1317    New PBF: 46.6      BMR: 1388 (inbody)    Review of Systems   Constitutional: Negative for chills and fever.   Respiratory: Negative for shortness of breath.         + snores.    Cardiovascular: Negative for chest pain, palpitations and leg swelling.   Gastrointestinal: Negative for constipation and diarrhea.        Denies GERD   Genitourinary:        Using condoms for contraception.      Neurological: Negative for dizziness and light-headedness.   Psychiatric/Behavioral: Negative for dysphoric mood. The patient is not nervous/anxious.        Objective:     /72   Pulse 82   Resp 18   Wt 82.1 kg (181 lb)   LMP 12/13/2020 (Exact Date)   SpO2 99%   BMI 31.07 kg/m²    Physical Exam   Constitutional: She is oriented to person, place, and time. She appears well-developed and well-nourished. No distress.   overweight   HENT:   Head: Normocephalic and atraumatic.   Mouth/Throat: No oropharyngeal exudate.   Eyes:  EOM are normal. No scleral icterus.   Neck: Normal range of motion.  Cardiovascular: Normal rate  Pulmonary/Chest: Effort normal. No respiratory distress  Musculoskeletal: Normal range of motion. She exhibits no edema.   Neurological: She is alert and oriented to person, place, and time. No cranial nerve deficit.   Skin: Skin is warm and dry. No erythema.   Psychiatric: She has a normal mood and affect. Her behavior is normal. Judgment normal.   Vitals reviewed.      Assessment:       1. Obesity, Class I, BMI 30.0-34.9 (see actual BMI)    2. Migraine without aura  and without status migrainosus, not intractable        Plan:         Rhea was seen today for follow-up.    Diagnoses and all orders for this visit:    Obesity, Class I, BMI 30.0-34.9 (see actual BMI)  -     diethylpropion (TENUATE) 75 mg SR tablet; Take 1 tablet (75 mg total) by mouth once daily.    Migraine without aura and without status migrainosus, not intractable  -     topiramate (TOPAMAX) 25 MG tablet; Take 1 tablet (25 mg total) by mouth 2 (two) times daily.       Patient was informed that topiramate is used for migraine prevention and seizures. Weight loss is a common side effect that is well documented. S/he understands this. S/he was informed of the potential side effects such as serious and possibly fatal rash in which case the medication should be discontinued immediately. Paresthesias, forgetfulness, fatigue, kidney stones, GI symptoms, and changes in lab values such as electrolytes, blood counts and kidney function.    Start topiramate  in the evening for 1 week, then morning and evening.     Pt advised not to get pregnant while on this medication.     Patient warned of common side effects of diethylpropion including anxiety, insomnia, palpitations and increased blood pressure. It was also explained that it is for short-term usage along with diet and exercise, and that stopping the medication without making lifestyle changes will result in regain of weight. Patient states understanding.    Weight loss medications are controlled substances.  They require routine follow up. Prescription or pills that are lost or destroyed will not be replaced.         1000 calories a day  40%  carbs(100 grams)  30% protein (75 grams)- 50 g minimum!  30 % fat (33 grams)  Food diary or calorie tracking Karlene      Exercise -Add some type of resistance training 2-3 days a week. These can be body weight exercises, light weight or elastic bands. [employer] [facility] are great sources for free work out plans and videos.      1000 raymond pescetarian menu and holiday tips given.

## 2021-01-06 ENCOUNTER — OFFICE VISIT (OUTPATIENT)
Dept: OPTOMETRY | Facility: CLINIC | Age: 26
End: 2021-01-06
Payer: COMMERCIAL

## 2021-01-06 DIAGNOSIS — Z13.5 SCREENING FOR EYE CONDITION: ICD-10-CM

## 2021-01-06 DIAGNOSIS — H53.8 BLURRED VISION, LEFT EYE: Primary | ICD-10-CM

## 2021-01-06 DIAGNOSIS — H52.7 REFRACTIVE ERRORS: ICD-10-CM

## 2021-01-06 PROCEDURE — 92015 PR REFRACTION: ICD-10-PCS | Mod: S$GLB,,, | Performed by: OPTOMETRIST

## 2021-01-06 PROCEDURE — 99999 PR PBB SHADOW E&M-EST. PATIENT-LVL III: CPT | Mod: PBBFAC,,, | Performed by: OPTOMETRIST

## 2021-01-06 PROCEDURE — 99999 PR PBB SHADOW E&M-EST. PATIENT-LVL III: ICD-10-PCS | Mod: PBBFAC,,, | Performed by: OPTOMETRIST

## 2021-01-06 PROCEDURE — 92014 PR EYE EXAM, EST PATIENT,COMPREHESV: ICD-10-PCS | Mod: S$GLB,,, | Performed by: OPTOMETRIST

## 2021-01-06 PROCEDURE — 92014 COMPRE OPH EXAM EST PT 1/>: CPT | Mod: S$GLB,,, | Performed by: OPTOMETRIST

## 2021-01-06 PROCEDURE — 92015 DETERMINE REFRACTIVE STATE: CPT | Mod: S$GLB,,, | Performed by: OPTOMETRIST

## 2021-02-03 ENCOUNTER — PATIENT MESSAGE (OUTPATIENT)
Dept: BARIATRICS | Facility: CLINIC | Age: 26
End: 2021-02-03

## 2021-02-03 DIAGNOSIS — Z00.00 ROUTINE CHECK-UP: ICD-10-CM

## 2021-02-03 DIAGNOSIS — G43.009 MIGRAINE WITHOUT AURA AND WITHOUT STATUS MIGRAINOSUS, NOT INTRACTABLE: ICD-10-CM

## 2021-02-03 DIAGNOSIS — R53.83 FATIGUE, UNSPECIFIED TYPE: Primary | ICD-10-CM

## 2021-02-03 RX ORDER — PROPRANOLOL HYDROCHLORIDE 10 MG/1
20 TABLET ORAL 3 TIMES DAILY
Qty: 270 TABLET | Refills: 0 | Status: SHIPPED | OUTPATIENT
Start: 2021-02-03 | End: 2021-03-31 | Stop reason: SDUPTHER

## 2021-02-04 ENCOUNTER — PATIENT MESSAGE (OUTPATIENT)
Dept: BARIATRICS | Facility: CLINIC | Age: 26
End: 2021-02-04

## 2021-02-05 ENCOUNTER — TELEPHONE (OUTPATIENT)
Dept: PRIMARY CARE CLINIC | Facility: OTHER | Age: 26
End: 2021-02-05

## 2021-02-05 ENCOUNTER — NURSE TRIAGE (OUTPATIENT)
Dept: ADMINISTRATIVE | Facility: CLINIC | Age: 26
End: 2021-02-05

## 2021-02-05 ENCOUNTER — LAB VISIT (OUTPATIENT)
Dept: INTERNAL MEDICINE | Facility: CLINIC | Age: 26
End: 2021-02-05
Payer: COMMERCIAL

## 2021-02-05 DIAGNOSIS — R53.83 FATIGUE, UNSPECIFIED TYPE: ICD-10-CM

## 2021-02-05 DIAGNOSIS — R05.9 COUGH: ICD-10-CM

## 2021-02-05 DIAGNOSIS — R52 BODY ACHES: ICD-10-CM

## 2021-02-05 DIAGNOSIS — R06.02 SOB (SHORTNESS OF BREATH): Primary | ICD-10-CM

## 2021-02-05 DIAGNOSIS — R68.83 CHILLS: ICD-10-CM

## 2021-02-05 DIAGNOSIS — R06.02 SOB (SHORTNESS OF BREATH): ICD-10-CM

## 2021-02-05 LAB
CTP QC/QA: YES
SARS-COV-2 RDRP RESP QL NAA+PROBE: NEGATIVE

## 2021-02-05 PROCEDURE — U0002: ICD-10-PCS | Mod: QW,S$GLB,, | Performed by: PREVENTIVE MEDICINE

## 2021-02-05 PROCEDURE — U0002 COVID-19 LAB TEST NON-CDC: HCPCS | Mod: QW,S$GLB,, | Performed by: PREVENTIVE MEDICINE

## 2021-02-08 ENCOUNTER — LAB VISIT (OUTPATIENT)
Dept: LAB | Facility: HOSPITAL | Age: 26
End: 2021-02-08
Attending: INTERNAL MEDICINE
Payer: COMMERCIAL

## 2021-02-08 DIAGNOSIS — R53.83 FATIGUE, UNSPECIFIED TYPE: ICD-10-CM

## 2021-02-08 DIAGNOSIS — Z00.00 ROUTINE CHECK-UP: ICD-10-CM

## 2021-02-08 LAB
ALBUMIN SERPL BCP-MCNC: 3.9 G/DL (ref 3.5–5.2)
ALP SERPL-CCNC: 40 U/L (ref 55–135)
ALT SERPL W/O P-5'-P-CCNC: 10 U/L (ref 10–44)
ANION GAP SERPL CALC-SCNC: 8 MMOL/L (ref 8–16)
AST SERPL-CCNC: 16 U/L (ref 10–40)
BASOPHILS # BLD AUTO: 0.06 K/UL (ref 0–0.2)
BASOPHILS NFR BLD: 1.1 % (ref 0–1.9)
BILIRUB SERPL-MCNC: 0.5 MG/DL (ref 0.1–1)
BUN SERPL-MCNC: 7 MG/DL (ref 6–20)
CALCIUM SERPL-MCNC: 8.9 MG/DL (ref 8.7–10.5)
CHLORIDE SERPL-SCNC: 106 MMOL/L (ref 95–110)
CO2 SERPL-SCNC: 27 MMOL/L (ref 23–29)
CREAT SERPL-MCNC: 0.7 MG/DL (ref 0.5–1.4)
DIFFERENTIAL METHOD: ABNORMAL
EOSINOPHIL # BLD AUTO: 0.5 K/UL (ref 0–0.5)
EOSINOPHIL NFR BLD: 8.6 % (ref 0–8)
ERYTHROCYTE [DISTWIDTH] IN BLOOD BY AUTOMATED COUNT: 12.9 % (ref 11.5–14.5)
EST. GFR  (AFRICAN AMERICAN): >60 ML/MIN/1.73 M^2
EST. GFR  (NON AFRICAN AMERICAN): >60 ML/MIN/1.73 M^2
ESTIMATED AVG GLUCOSE: 100 MG/DL (ref 68–131)
GLUCOSE SERPL-MCNC: 84 MG/DL (ref 70–110)
HBA1C MFR BLD: 5.1 % (ref 4–5.6)
HCT VFR BLD AUTO: 40.4 % (ref 37–48.5)
HGB BLD-MCNC: 12.8 G/DL (ref 12–16)
IMM GRANULOCYTES # BLD AUTO: 0.01 K/UL (ref 0–0.04)
IMM GRANULOCYTES NFR BLD AUTO: 0.2 % (ref 0–0.5)
IRON SERPL-MCNC: 99 UG/DL (ref 30–160)
LYMPHOCYTES # BLD AUTO: 2.1 K/UL (ref 1–4.8)
LYMPHOCYTES NFR BLD: 37.7 % (ref 18–48)
MCH RBC QN AUTO: 29.9 PG (ref 27–31)
MCHC RBC AUTO-ENTMCNC: 31.7 G/DL (ref 32–36)
MCV RBC AUTO: 94 FL (ref 82–98)
MONOCYTES # BLD AUTO: 0.4 K/UL (ref 0.3–1)
MONOCYTES NFR BLD: 6.7 % (ref 4–15)
NEUTROPHILS # BLD AUTO: 2.6 K/UL (ref 1.8–7.7)
NEUTROPHILS NFR BLD: 45.7 % (ref 38–73)
NRBC BLD-RTO: 0 /100 WBC
PLATELET # BLD AUTO: 325 K/UL (ref 150–350)
PMV BLD AUTO: 10.1 FL (ref 9.2–12.9)
POTASSIUM SERPL-SCNC: 3.7 MMOL/L (ref 3.5–5.1)
PROT SERPL-MCNC: 7.1 G/DL (ref 6–8.4)
RBC # BLD AUTO: 4.28 M/UL (ref 4–5.4)
SATURATED IRON: 23 % (ref 20–50)
SODIUM SERPL-SCNC: 141 MMOL/L (ref 136–145)
TOTAL IRON BINDING CAPACITY: 431 UG/DL (ref 250–450)
TRANSFERRIN SERPL-MCNC: 291 MG/DL (ref 200–375)
TSH SERPL DL<=0.005 MIU/L-ACNC: 0.83 UIU/ML (ref 0.4–4)
WBC # BLD AUTO: 5.67 K/UL (ref 3.9–12.7)

## 2021-02-08 PROCEDURE — 36415 COLL VENOUS BLD VENIPUNCTURE: CPT

## 2021-02-08 PROCEDURE — 83540 ASSAY OF IRON: CPT

## 2021-02-08 PROCEDURE — 83036 HEMOGLOBIN GLYCOSYLATED A1C: CPT

## 2021-02-08 PROCEDURE — 80053 COMPREHEN METABOLIC PANEL: CPT

## 2021-02-08 PROCEDURE — 85025 COMPLETE CBC W/AUTO DIFF WBC: CPT

## 2021-02-08 PROCEDURE — 84443 ASSAY THYROID STIM HORMONE: CPT

## 2021-03-04 ENCOUNTER — HOSPITAL ENCOUNTER (EMERGENCY)
Facility: OTHER | Age: 26
Discharge: HOME | End: 2021-03-04
Attending: EMERGENCY MEDICINE
Payer: COMMERCIAL

## 2021-03-04 VITALS
SYSTOLIC BLOOD PRESSURE: 119 MMHG | HEART RATE: 96 BPM | WEIGHT: 176 LBS | TEMPERATURE: 98 F | BODY MASS INDEX: 30.05 KG/M2 | HEIGHT: 64 IN | DIASTOLIC BLOOD PRESSURE: 80 MMHG | OXYGEN SATURATION: 100 % | RESPIRATION RATE: 18 BRPM

## 2021-03-04 DIAGNOSIS — R10.13 EPIGASTRIC ABDOMINAL PAIN: ICD-10-CM

## 2021-03-04 DIAGNOSIS — R11.2 NON-INTRACTABLE VOMITING WITH NAUSEA, UNSPECIFIED VOMITING TYPE: Primary | ICD-10-CM

## 2021-03-04 DIAGNOSIS — R07.9 CHEST PAIN: ICD-10-CM

## 2021-03-04 LAB
ALBUMIN SERPL BCP-MCNC: 4.2 G/DL (ref 3.5–5.2)
ALP SERPL-CCNC: 43 U/L (ref 55–135)
ALT SERPL W/O P-5'-P-CCNC: 14 U/L (ref 10–44)
ANION GAP SERPL CALC-SCNC: 8 MMOL/L (ref 8–16)
AST SERPL-CCNC: 17 U/L (ref 10–40)
B-HCG UR QL: NEGATIVE
BASOPHILS # BLD AUTO: 0.05 K/UL (ref 0–0.2)
BASOPHILS NFR BLD: 0.7 % (ref 0–1.9)
BILIRUB SERPL-MCNC: 0.2 MG/DL (ref 0.1–1)
BILIRUB UR QL STRIP: NEGATIVE
BUN SERPL-MCNC: 13 MG/DL (ref 6–20)
CALCIUM SERPL-MCNC: 9.4 MG/DL (ref 8.7–10.5)
CHLORIDE SERPL-SCNC: 105 MMOL/L (ref 95–110)
CLARITY UR: ABNORMAL
CO2 SERPL-SCNC: 29 MMOL/L (ref 23–29)
COLOR UR: YELLOW
CREAT SERPL-MCNC: 0.8 MG/DL (ref 0.5–1.4)
CTP QC/QA: YES
CTP QC/QA: YES
DIFFERENTIAL METHOD: ABNORMAL
EOSINOPHIL # BLD AUTO: 0.1 K/UL (ref 0–0.5)
EOSINOPHIL NFR BLD: 1.6 % (ref 0–8)
ERYTHROCYTE [DISTWIDTH] IN BLOOD BY AUTOMATED COUNT: 12.6 % (ref 11.5–14.5)
EST. GFR  (AFRICAN AMERICAN): >60 ML/MIN/1.73 M^2
EST. GFR  (NON AFRICAN AMERICAN): >60 ML/MIN/1.73 M^2
GLUCOSE SERPL-MCNC: 103 MG/DL (ref 70–110)
GLUCOSE UR QL STRIP: NEGATIVE
HCT VFR BLD AUTO: 39.1 % (ref 37–48.5)
HCV AB SERPL QL IA: NEGATIVE
HGB BLD-MCNC: 12.5 G/DL (ref 12–16)
HGB UR QL STRIP: NEGATIVE
HIV 1+2 AB+HIV1 P24 AG SERPL QL IA: NEGATIVE
IMM GRANULOCYTES # BLD AUTO: 0.01 K/UL (ref 0–0.04)
IMM GRANULOCYTES NFR BLD AUTO: 0.1 % (ref 0–0.5)
KETONES UR QL STRIP: NEGATIVE
LEUKOCYTE ESTERASE UR QL STRIP: NEGATIVE
LYMPHOCYTES # BLD AUTO: 1.3 K/UL (ref 1–4.8)
LYMPHOCYTES NFR BLD: 17.4 % (ref 18–48)
MCH RBC QN AUTO: 29.7 PG (ref 27–31)
MCHC RBC AUTO-ENTMCNC: 32 G/DL (ref 32–36)
MCV RBC AUTO: 93 FL (ref 82–98)
MONOCYTES # BLD AUTO: 0.3 K/UL (ref 0.3–1)
MONOCYTES NFR BLD: 3.7 % (ref 4–15)
NEUTROPHILS # BLD AUTO: 5.6 K/UL (ref 1.8–7.7)
NEUTROPHILS NFR BLD: 76.5 % (ref 38–73)
NITRITE UR QL STRIP: NEGATIVE
NRBC BLD-RTO: 0 /100 WBC
PH UR STRIP: 8 [PH] (ref 5–8)
PLATELET # BLD AUTO: 278 K/UL (ref 150–350)
PMV BLD AUTO: 9.6 FL (ref 9.2–12.9)
POTASSIUM SERPL-SCNC: 3.6 MMOL/L (ref 3.5–5.1)
PROT SERPL-MCNC: 7.5 G/DL (ref 6–8.4)
PROT UR QL STRIP: ABNORMAL
RBC # BLD AUTO: 4.21 M/UL (ref 4–5.4)
SARS-COV-2 RDRP RESP QL NAA+PROBE: NEGATIVE
SODIUM SERPL-SCNC: 142 MMOL/L (ref 136–145)
SP GR UR STRIP: 1.02 (ref 1–1.03)
URN SPEC COLLECT METH UR: ABNORMAL
UROBILINOGEN UR STRIP-ACNC: 1 EU/DL
WBC # BLD AUTO: 7.28 K/UL (ref 3.9–12.7)

## 2021-03-04 PROCEDURE — 63600175 PHARM REV CODE 636 W HCPCS: Performed by: EMERGENCY MEDICINE

## 2021-03-04 PROCEDURE — 86703 HIV-1/HIV-2 1 RESULT ANTBDY: CPT | Performed by: EMERGENCY MEDICINE

## 2021-03-04 PROCEDURE — 81003 URINALYSIS AUTO W/O SCOPE: CPT | Performed by: NURSE PRACTITIONER

## 2021-03-04 PROCEDURE — U0002 COVID-19 LAB TEST NON-CDC: HCPCS | Performed by: EMERGENCY MEDICINE

## 2021-03-04 PROCEDURE — 25000003 PHARM REV CODE 250: Performed by: EMERGENCY MEDICINE

## 2021-03-04 PROCEDURE — 25000003 PHARM REV CODE 250: Performed by: NURSE PRACTITIONER

## 2021-03-04 PROCEDURE — 81025 URINE PREGNANCY TEST: CPT | Performed by: EMERGENCY MEDICINE

## 2021-03-04 PROCEDURE — 96374 THER/PROPH/DIAG INJ IV PUSH: CPT

## 2021-03-04 PROCEDURE — 85025 COMPLETE CBC W/AUTO DIFF WBC: CPT | Performed by: NURSE PRACTITIONER

## 2021-03-04 PROCEDURE — 99284 EMERGENCY DEPT VISIT MOD MDM: CPT | Mod: 25

## 2021-03-04 PROCEDURE — 96361 HYDRATE IV INFUSION ADD-ON: CPT

## 2021-03-04 PROCEDURE — 86803 HEPATITIS C AB TEST: CPT | Performed by: EMERGENCY MEDICINE

## 2021-03-04 PROCEDURE — 80053 COMPREHEN METABOLIC PANEL: CPT | Performed by: NURSE PRACTITIONER

## 2021-03-04 RX ORDER — MAG HYDROX/ALUMINUM HYD/SIMETH 200-200-20
30 SUSPENSION, ORAL (FINAL DOSE FORM) ORAL ONCE
Status: COMPLETED | OUTPATIENT
Start: 2021-03-04 | End: 2021-03-04

## 2021-03-04 RX ORDER — PANTOPRAZOLE SODIUM 40 MG/1
40 TABLET, DELAYED RELEASE ORAL
Status: COMPLETED | OUTPATIENT
Start: 2021-03-04 | End: 2021-03-04

## 2021-03-04 RX ORDER — MAG HYDROX/ALUMINUM HYD/SIMETH 200-200-20
30 SUSPENSION, ORAL (FINAL DOSE FORM) ORAL
Status: DISCONTINUED | OUTPATIENT
Start: 2021-03-05 | End: 2021-03-04

## 2021-03-04 RX ORDER — METOCLOPRAMIDE HYDROCHLORIDE 5 MG/ML
10 INJECTION INTRAMUSCULAR; INTRAVENOUS
Status: COMPLETED | OUTPATIENT
Start: 2021-03-04 | End: 2021-03-04

## 2021-03-04 RX ORDER — DICYCLOMINE HYDROCHLORIDE 20 MG/1
20 TABLET ORAL 3 TIMES DAILY
Qty: 30 TABLET | Refills: 0 | Status: SHIPPED | OUTPATIENT
Start: 2021-03-04 | End: 2021-03-31 | Stop reason: SDUPTHER

## 2021-03-04 RX ORDER — OMEPRAZOLE 20 MG/1
40 CAPSULE, DELAYED RELEASE ORAL DAILY
Qty: 60 CAPSULE | Refills: 0 | Status: SHIPPED | OUTPATIENT
Start: 2021-03-04 | End: 2021-03-31 | Stop reason: SDUPTHER

## 2021-03-04 RX ORDER — ONDANSETRON 4 MG/1
4 TABLET, ORALLY DISINTEGRATING ORAL EVERY 6 HOURS PRN
Qty: 12 TABLET | Refills: 0 | Status: SHIPPED | OUTPATIENT
Start: 2021-03-04 | End: 2021-10-29

## 2021-03-04 RX ADMIN — ALUMINUM HYDROXIDE, MAGNESIUM HYDROXIDE, AND SIMETHICONE 30 ML: 200; 200; 20 SUSPENSION ORAL at 10:03

## 2021-03-04 RX ADMIN — METOCLOPRAMIDE 10 MG: 5 INJECTION, SOLUTION INTRAMUSCULAR; INTRAVENOUS at 09:03

## 2021-03-04 RX ADMIN — SODIUM CHLORIDE 1000 ML: 0.9 INJECTION, SOLUTION INTRAVENOUS at 08:03

## 2021-03-04 RX ADMIN — PANTOPRAZOLE SODIUM 40 MG: 40 TABLET, DELAYED RELEASE ORAL at 09:03

## 2021-03-18 ENCOUNTER — IMMUNIZATION (OUTPATIENT)
Dept: PRIMARY CARE CLINIC | Facility: CLINIC | Age: 26
End: 2021-03-18
Payer: COMMERCIAL

## 2021-03-18 DIAGNOSIS — Z23 NEED FOR VACCINATION: Primary | ICD-10-CM

## 2021-03-18 PROCEDURE — 0011A COVID-19, MRNA, LNP-S, PF, 100 MCG/0.5 ML DOSE VACCINE: CPT | Mod: PBBFAC | Performed by: EMERGENCY MEDICINE

## 2021-03-26 DIAGNOSIS — E66.9 OBESITY, CLASS I, BMI 30.0-34.9 (SEE ACTUAL BMI): Primary | ICD-10-CM

## 2021-03-26 DIAGNOSIS — Z83.3 FAMILY HISTORY OF DIABETES MELLITUS: ICD-10-CM

## 2021-03-30 ENCOUNTER — LAB VISIT (OUTPATIENT)
Dept: LAB | Facility: HOSPITAL | Age: 26
End: 2021-03-30
Attending: INTERNAL MEDICINE
Payer: COMMERCIAL

## 2021-03-30 DIAGNOSIS — E66.9 OBESITY, CLASS I, BMI 30.0-34.9 (SEE ACTUAL BMI): ICD-10-CM

## 2021-03-30 DIAGNOSIS — Z83.3 FAMILY HISTORY OF DIABETES MELLITUS: ICD-10-CM

## 2021-03-30 LAB
CHOLEST SERPL-MCNC: 154 MG/DL (ref 120–199)
CHOLEST/HDLC SERPL: 3.8 {RATIO} (ref 2–5)
ESTIMATED AVG GLUCOSE: 100 MG/DL (ref 68–131)
HBA1C MFR BLD: 5.1 % (ref 4–5.6)
HDLC SERPL-MCNC: 41 MG/DL (ref 40–75)
HDLC SERPL: 26.6 % (ref 20–50)
INSULIN COLLECTION INTERVAL: 0
INSULIN SERPL-ACNC: 11.3 UU/ML
LDLC SERPL CALC-MCNC: 100.6 MG/DL (ref 63–159)
NONHDLC SERPL-MCNC: 113 MG/DL
TRIGL SERPL-MCNC: 62 MG/DL (ref 30–150)

## 2021-03-30 PROCEDURE — 80061 LIPID PANEL: CPT | Performed by: INTERNAL MEDICINE

## 2021-03-30 PROCEDURE — 83036 HEMOGLOBIN GLYCOSYLATED A1C: CPT | Performed by: INTERNAL MEDICINE

## 2021-03-30 PROCEDURE — 83525 ASSAY OF INSULIN: CPT | Performed by: INTERNAL MEDICINE

## 2021-03-30 PROCEDURE — 36415 COLL VENOUS BLD VENIPUNCTURE: CPT | Performed by: INTERNAL MEDICINE

## 2021-03-31 ENCOUNTER — OFFICE VISIT (OUTPATIENT)
Dept: INTERNAL MEDICINE | Facility: CLINIC | Age: 26
End: 2021-03-31
Payer: COMMERCIAL

## 2021-03-31 VITALS
OXYGEN SATURATION: 99 % | DIASTOLIC BLOOD PRESSURE: 72 MMHG | HEIGHT: 64 IN | BODY MASS INDEX: 31.57 KG/M2 | WEIGHT: 184.94 LBS | HEART RATE: 106 BPM | SYSTOLIC BLOOD PRESSURE: 122 MMHG

## 2021-03-31 DIAGNOSIS — Z00.00 ANNUAL PHYSICAL EXAM: Primary | ICD-10-CM

## 2021-03-31 DIAGNOSIS — E55.9 VITAMIN D DEFICIENCY: ICD-10-CM

## 2021-03-31 DIAGNOSIS — E53.8 VITAMIN B12 DEFICIENCY: ICD-10-CM

## 2021-03-31 DIAGNOSIS — N62 MACROMASTIA: ICD-10-CM

## 2021-03-31 DIAGNOSIS — K21.9 GASTROESOPHAGEAL REFLUX DISEASE, UNSPECIFIED WHETHER ESOPHAGITIS PRESENT: ICD-10-CM

## 2021-03-31 DIAGNOSIS — I48.0 PAROXYSMAL ATRIAL FIBRILLATION: ICD-10-CM

## 2021-03-31 DIAGNOSIS — G89.29 CHRONIC BILATERAL THORACIC BACK PAIN: ICD-10-CM

## 2021-03-31 DIAGNOSIS — G43.009 MIGRAINE WITHOUT AURA AND WITHOUT STATUS MIGRAINOSUS, NOT INTRACTABLE: ICD-10-CM

## 2021-03-31 DIAGNOSIS — Z01.419 WELL WOMAN EXAM: ICD-10-CM

## 2021-03-31 DIAGNOSIS — M54.6 CHRONIC BILATERAL THORACIC BACK PAIN: ICD-10-CM

## 2021-03-31 PROCEDURE — 99999 PR PBB SHADOW E&M-EST. PATIENT-LVL V: ICD-10-PCS | Mod: PBBFAC,,, | Performed by: NURSE PRACTITIONER

## 2021-03-31 PROCEDURE — 1126F AMNT PAIN NOTED NONE PRSNT: CPT | Mod: S$GLB,,, | Performed by: NURSE PRACTITIONER

## 2021-03-31 PROCEDURE — 99395 PR PREVENTIVE VISIT,EST,18-39: ICD-10-PCS | Mod: S$GLB,,, | Performed by: NURSE PRACTITIONER

## 2021-03-31 PROCEDURE — 99999 PR PBB SHADOW E&M-EST. PATIENT-LVL V: CPT | Mod: PBBFAC,,, | Performed by: NURSE PRACTITIONER

## 2021-03-31 PROCEDURE — 99395 PREV VISIT EST AGE 18-39: CPT | Mod: S$GLB,,, | Performed by: NURSE PRACTITIONER

## 2021-03-31 PROCEDURE — 3008F BODY MASS INDEX DOCD: CPT | Mod: CPTII,S$GLB,, | Performed by: NURSE PRACTITIONER

## 2021-03-31 PROCEDURE — 3008F PR BODY MASS INDEX (BMI) DOCUMENTED: ICD-10-PCS | Mod: CPTII,S$GLB,, | Performed by: NURSE PRACTITIONER

## 2021-03-31 PROCEDURE — 1126F PR PAIN SEVERITY QUANTIFIED, NO PAIN PRESENT: ICD-10-PCS | Mod: S$GLB,,, | Performed by: NURSE PRACTITIONER

## 2021-03-31 RX ORDER — DICYCLOMINE HYDROCHLORIDE 20 MG/1
20 TABLET ORAL 3 TIMES DAILY
Qty: 30 TABLET | Refills: 0 | Status: SHIPPED | OUTPATIENT
Start: 2021-03-31 | End: 2021-04-30

## 2021-03-31 RX ORDER — PROPRANOLOL HYDROCHLORIDE 10 MG/1
20 TABLET ORAL 3 TIMES DAILY
Qty: 270 TABLET | Refills: 0 | Status: SHIPPED | OUTPATIENT
Start: 2021-03-31 | End: 2022-02-14 | Stop reason: SDUPTHER

## 2021-03-31 RX ORDER — DICYCLOMINE HYDROCHLORIDE 20 MG/1
20 TABLET ORAL 3 TIMES DAILY
Qty: 30 TABLET | Refills: 0 | Status: SHIPPED | OUTPATIENT
Start: 2021-03-31 | End: 2021-03-31 | Stop reason: SDUPTHER

## 2021-03-31 RX ORDER — PNV NO.95/FERROUS FUM/FOLIC AC 28MG-0.8MG
100 TABLET ORAL DAILY
Qty: 90 TABLET | Refills: 0 | Status: SHIPPED | OUTPATIENT
Start: 2021-03-31 | End: 2022-01-05 | Stop reason: CLARIF

## 2021-03-31 RX ORDER — OMEPRAZOLE 20 MG/1
40 CAPSULE, DELAYED RELEASE ORAL DAILY
Qty: 60 CAPSULE | Refills: 0 | Status: SHIPPED | OUTPATIENT
Start: 2021-03-31 | End: 2021-03-31 | Stop reason: SDUPTHER

## 2021-03-31 RX ORDER — OMEPRAZOLE 20 MG/1
40 CAPSULE, DELAYED RELEASE ORAL DAILY
Qty: 60 CAPSULE | Refills: 0 | Status: SHIPPED | OUTPATIENT
Start: 2021-03-31 | End: 2023-02-13

## 2021-04-01 ENCOUNTER — PATIENT MESSAGE (OUTPATIENT)
Dept: BARIATRICS | Facility: CLINIC | Age: 26
End: 2021-04-01

## 2021-04-01 DIAGNOSIS — E88.819 INSULIN RESISTANCE: Primary | ICD-10-CM

## 2021-04-01 RX ORDER — METFORMIN HYDROCHLORIDE 500 MG/1
500 TABLET, EXTENDED RELEASE ORAL
Qty: 90 TABLET | Refills: 0 | Status: SHIPPED | OUTPATIENT
Start: 2021-04-01 | End: 2021-04-26 | Stop reason: SDUPTHER

## 2021-04-16 ENCOUNTER — IMMUNIZATION (OUTPATIENT)
Dept: PHARMACY | Facility: CLINIC | Age: 26
End: 2021-04-16
Payer: COMMERCIAL

## 2021-04-16 DIAGNOSIS — Z23 NEED FOR VACCINATION: Primary | ICD-10-CM

## 2021-04-26 ENCOUNTER — OFFICE VISIT (OUTPATIENT)
Dept: BARIATRICS | Facility: CLINIC | Age: 26
End: 2021-04-26
Payer: COMMERCIAL

## 2021-04-26 VITALS
HEART RATE: 86 BPM | WEIGHT: 187.31 LBS | BODY MASS INDEX: 32.15 KG/M2 | OXYGEN SATURATION: 98 % | SYSTOLIC BLOOD PRESSURE: 100 MMHG | DIASTOLIC BLOOD PRESSURE: 64 MMHG

## 2021-04-26 DIAGNOSIS — E66.9 OBESITY, CLASS I, BMI 30.0-34.9 (SEE ACTUAL BMI): Primary | ICD-10-CM

## 2021-04-26 DIAGNOSIS — E88.819 INSULIN RESISTANCE: ICD-10-CM

## 2021-04-26 DIAGNOSIS — G43.009 MIGRAINE WITHOUT AURA AND WITHOUT STATUS MIGRAINOSUS, NOT INTRACTABLE: ICD-10-CM

## 2021-04-26 PROCEDURE — 99213 PR OFFICE/OUTPT VISIT, EST, LEVL III, 20-29 MIN: ICD-10-PCS | Mod: S$GLB,,, | Performed by: INTERNAL MEDICINE

## 2021-04-26 PROCEDURE — 1126F AMNT PAIN NOTED NONE PRSNT: CPT | Mod: S$GLB,,, | Performed by: INTERNAL MEDICINE

## 2021-04-26 PROCEDURE — 3008F PR BODY MASS INDEX (BMI) DOCUMENTED: ICD-10-PCS | Mod: CPTII,S$GLB,, | Performed by: INTERNAL MEDICINE

## 2021-04-26 PROCEDURE — 1126F PR PAIN SEVERITY QUANTIFIED, NO PAIN PRESENT: ICD-10-PCS | Mod: S$GLB,,, | Performed by: INTERNAL MEDICINE

## 2021-04-26 PROCEDURE — 3008F BODY MASS INDEX DOCD: CPT | Mod: CPTII,S$GLB,, | Performed by: INTERNAL MEDICINE

## 2021-04-26 PROCEDURE — 99213 OFFICE O/P EST LOW 20 MIN: CPT | Mod: S$GLB,,, | Performed by: INTERNAL MEDICINE

## 2021-04-26 PROCEDURE — 99999 PR PBB SHADOW E&M-EST. PATIENT-LVL III: ICD-10-PCS | Mod: PBBFAC,,, | Performed by: INTERNAL MEDICINE

## 2021-04-26 PROCEDURE — 99999 PR PBB SHADOW E&M-EST. PATIENT-LVL III: CPT | Mod: PBBFAC,,, | Performed by: INTERNAL MEDICINE

## 2021-04-26 RX ORDER — METFORMIN HYDROCHLORIDE 500 MG/1
500 TABLET, EXTENDED RELEASE ORAL
Qty: 90 TABLET | Refills: 0 | Status: SHIPPED | OUTPATIENT
Start: 2021-04-26 | End: 2021-06-24 | Stop reason: SDUPTHER

## 2021-04-26 RX ORDER — TOPIRAMATE 25 MG/1
25 TABLET ORAL DAILY
Qty: 90 TABLET | Refills: 0 | Status: SHIPPED | OUTPATIENT
Start: 2021-04-26 | End: 2021-11-09

## 2021-04-26 RX ORDER — PHENTERMINE HYDROCHLORIDE 37.5 MG/1
TABLET ORAL
Qty: 30 TABLET | Refills: 0 | Status: SHIPPED | OUTPATIENT
Start: 2021-04-26 | End: 2021-06-03 | Stop reason: SDUPTHER

## 2021-06-01 ENCOUNTER — PATIENT MESSAGE (OUTPATIENT)
Dept: BARIATRICS | Facility: CLINIC | Age: 26
End: 2021-06-01

## 2021-06-01 ENCOUNTER — OFFICE VISIT (OUTPATIENT)
Dept: PLASTIC SURGERY | Facility: CLINIC | Age: 26
End: 2021-06-01
Payer: COMMERCIAL

## 2021-06-01 VITALS
HEART RATE: 98 BPM | HEIGHT: 64 IN | DIASTOLIC BLOOD PRESSURE: 73 MMHG | SYSTOLIC BLOOD PRESSURE: 128 MMHG | BODY MASS INDEX: 30.75 KG/M2 | WEIGHT: 180.13 LBS

## 2021-06-01 DIAGNOSIS — R21 EXCORIATED RASH: ICD-10-CM

## 2021-06-01 DIAGNOSIS — N62 MACROMASTIA: ICD-10-CM

## 2021-06-01 DIAGNOSIS — M54.6 CHRONIC BILATERAL THORACIC BACK PAIN: ICD-10-CM

## 2021-06-01 DIAGNOSIS — G89.29 CHRONIC BILATERAL THORACIC BACK PAIN: ICD-10-CM

## 2021-06-01 DIAGNOSIS — G89.29 NECK PAIN, CHRONIC: Primary | ICD-10-CM

## 2021-06-01 DIAGNOSIS — M54.2 NECK PAIN, CHRONIC: Primary | ICD-10-CM

## 2021-06-01 PROCEDURE — 99203 OFFICE O/P NEW LOW 30 MIN: CPT | Mod: S$GLB,,, | Performed by: PHYSICIAN ASSISTANT

## 2021-06-01 PROCEDURE — 99999 PR PBB SHADOW E&M-EST. PATIENT-LVL IV: ICD-10-PCS | Mod: PBBFAC,,, | Performed by: PHYSICIAN ASSISTANT

## 2021-06-01 PROCEDURE — 1125F PR PAIN SEVERITY QUANTIFIED, PAIN PRESENT: ICD-10-PCS | Mod: S$GLB,,, | Performed by: PHYSICIAN ASSISTANT

## 2021-06-01 PROCEDURE — 99999 PR PBB SHADOW E&M-EST. PATIENT-LVL IV: CPT | Mod: PBBFAC,,, | Performed by: PHYSICIAN ASSISTANT

## 2021-06-01 PROCEDURE — 3008F BODY MASS INDEX DOCD: CPT | Mod: CPTII,S$GLB,, | Performed by: PHYSICIAN ASSISTANT

## 2021-06-01 PROCEDURE — 1125F AMNT PAIN NOTED PAIN PRSNT: CPT | Mod: S$GLB,,, | Performed by: PHYSICIAN ASSISTANT

## 2021-06-01 PROCEDURE — 99203 PR OFFICE/OUTPT VISIT, NEW, LEVL III, 30-44 MIN: ICD-10-PCS | Mod: S$GLB,,, | Performed by: PHYSICIAN ASSISTANT

## 2021-06-01 PROCEDURE — 3008F PR BODY MASS INDEX (BMI) DOCUMENTED: ICD-10-PCS | Mod: CPTII,S$GLB,, | Performed by: PHYSICIAN ASSISTANT

## 2021-06-03 RX ORDER — PHENTERMINE HYDROCHLORIDE 37.5 MG/1
TABLET ORAL
Qty: 30 TABLET | Refills: 1 | Status: SHIPPED | OUTPATIENT
Start: 2021-06-03 | End: 2021-10-29

## 2021-06-09 ENCOUNTER — HOSPITAL ENCOUNTER (EMERGENCY)
Facility: OTHER | Age: 26
Discharge: HOME | End: 2021-06-09
Attending: EMERGENCY MEDICINE
Payer: COMMERCIAL

## 2021-06-09 VITALS
HEIGHT: 64 IN | DIASTOLIC BLOOD PRESSURE: 79 MMHG | OXYGEN SATURATION: 100 % | RESPIRATION RATE: 18 BRPM | BODY MASS INDEX: 30.56 KG/M2 | HEART RATE: 68 BPM | SYSTOLIC BLOOD PRESSURE: 123 MMHG | TEMPERATURE: 97 F | WEIGHT: 179 LBS

## 2021-06-09 DIAGNOSIS — R06.02 SOB (SHORTNESS OF BREATH): ICD-10-CM

## 2021-06-09 DIAGNOSIS — R00.2 PALPITATIONS: ICD-10-CM

## 2021-06-09 DIAGNOSIS — F41.0 PANIC ATTACK: Primary | ICD-10-CM

## 2021-06-09 LAB
ALBUMIN SERPL BCP-MCNC: 3.9 G/DL (ref 3.5–5.2)
ALP SERPL-CCNC: 45 U/L (ref 55–135)
ALT SERPL W/O P-5'-P-CCNC: 13 U/L (ref 10–44)
ANION GAP SERPL CALC-SCNC: 10 MMOL/L (ref 8–16)
AST SERPL-CCNC: 17 U/L (ref 10–40)
B-HCG UR QL: NEGATIVE
BASOPHILS # BLD AUTO: 0.06 K/UL (ref 0–0.2)
BASOPHILS NFR BLD: 1.1 % (ref 0–1.9)
BILIRUB SERPL-MCNC: 0.7 MG/DL (ref 0.1–1)
BUN SERPL-MCNC: 8 MG/DL (ref 6–20)
CALCIUM SERPL-MCNC: 8.8 MG/DL (ref 8.7–10.5)
CHLORIDE SERPL-SCNC: 106 MMOL/L (ref 95–110)
CO2 SERPL-SCNC: 23 MMOL/L (ref 23–29)
CREAT SERPL-MCNC: 0.8 MG/DL (ref 0.5–1.4)
CTP QC/QA: YES
DIFFERENTIAL METHOD: NORMAL
EOSINOPHIL # BLD AUTO: 0.2 K/UL (ref 0–0.5)
EOSINOPHIL NFR BLD: 3.8 % (ref 0–8)
ERYTHROCYTE [DISTWIDTH] IN BLOOD BY AUTOMATED COUNT: 12.7 % (ref 11.5–14.5)
EST. GFR  (AFRICAN AMERICAN): >60 ML/MIN/1.73 M^2
EST. GFR  (NON AFRICAN AMERICAN): >60 ML/MIN/1.73 M^2
GLUCOSE SERPL-MCNC: 104 MG/DL (ref 70–110)
HCT VFR BLD AUTO: 41.3 % (ref 37–48.5)
HGB BLD-MCNC: 13.3 G/DL (ref 12–16)
IMM GRANULOCYTES # BLD AUTO: 0.02 K/UL (ref 0–0.04)
IMM GRANULOCYTES NFR BLD AUTO: 0.4 % (ref 0–0.5)
LYMPHOCYTES # BLD AUTO: 1.5 K/UL (ref 1–4.8)
LYMPHOCYTES NFR BLD: 26.7 % (ref 18–48)
MAGNESIUM SERPL-MCNC: 1.8 MG/DL (ref 1.6–2.6)
MCH RBC QN AUTO: 30.2 PG (ref 27–31)
MCHC RBC AUTO-ENTMCNC: 32.2 G/DL (ref 32–36)
MCV RBC AUTO: 94 FL (ref 82–98)
MONOCYTES # BLD AUTO: 0.4 K/UL (ref 0.3–1)
MONOCYTES NFR BLD: 6.7 % (ref 4–15)
NEUTROPHILS # BLD AUTO: 3.4 K/UL (ref 1.8–7.7)
NEUTROPHILS NFR BLD: 61.3 % (ref 38–73)
NRBC BLD-RTO: 0 /100 WBC
PLATELET # BLD AUTO: 228 K/UL (ref 150–450)
PLATELET BLD QL SMEAR: NORMAL
PMV BLD AUTO: 9.9 FL (ref 9.2–12.9)
POTASSIUM SERPL-SCNC: 4 MMOL/L (ref 3.5–5.1)
PROT SERPL-MCNC: 7.4 G/DL (ref 6–8.4)
RBC # BLD AUTO: 4.41 M/UL (ref 4–5.4)
SODIUM SERPL-SCNC: 139 MMOL/L (ref 136–145)
WBC # BLD AUTO: 5.51 K/UL (ref 3.9–12.7)

## 2021-06-09 PROCEDURE — 93005 ELECTROCARDIOGRAM TRACING: CPT

## 2021-06-09 PROCEDURE — 93010 ELECTROCARDIOGRAM REPORT: CPT | Mod: ,,, | Performed by: INTERNAL MEDICINE

## 2021-06-09 PROCEDURE — 81025 URINE PREGNANCY TEST: CPT | Performed by: EMERGENCY MEDICINE

## 2021-06-09 PROCEDURE — 36000 PLACE NEEDLE IN VEIN: CPT

## 2021-06-09 PROCEDURE — 85025 COMPLETE CBC W/AUTO DIFF WBC: CPT | Performed by: EMERGENCY MEDICINE

## 2021-06-09 PROCEDURE — 83735 ASSAY OF MAGNESIUM: CPT | Performed by: EMERGENCY MEDICINE

## 2021-06-09 PROCEDURE — 99285 EMERGENCY DEPT VISIT HI MDM: CPT | Mod: 25

## 2021-06-09 PROCEDURE — 93010 EKG 12-LEAD: ICD-10-PCS | Mod: ,,, | Performed by: INTERNAL MEDICINE

## 2021-06-09 PROCEDURE — 80053 COMPREHEN METABOLIC PANEL: CPT | Performed by: EMERGENCY MEDICINE

## 2021-06-24 DIAGNOSIS — E88.819 INSULIN RESISTANCE: ICD-10-CM

## 2021-06-25 RX ORDER — PHENTERMINE HYDROCHLORIDE 37.5 MG/1
TABLET ORAL
Qty: 30 TABLET | Refills: 1 | OUTPATIENT
Start: 2021-06-25

## 2021-06-25 RX ORDER — METFORMIN HYDROCHLORIDE 500 MG/1
500 TABLET, EXTENDED RELEASE ORAL
Qty: 90 TABLET | Refills: 0 | Status: SHIPPED | OUTPATIENT
Start: 2021-06-25 | End: 2021-11-09

## 2021-07-15 ENCOUNTER — TELEPHONE (OUTPATIENT)
Dept: BARIATRICS | Facility: CLINIC | Age: 26
End: 2021-07-15
Payer: COMMERCIAL

## 2021-07-26 ENCOUNTER — OFFICE VISIT (OUTPATIENT)
Dept: BARIATRICS | Facility: CLINIC | Age: 26
End: 2021-07-26
Payer: COMMERCIAL

## 2021-07-26 VITALS
SYSTOLIC BLOOD PRESSURE: 111 MMHG | HEART RATE: 93 BPM | BODY MASS INDEX: 30.23 KG/M2 | OXYGEN SATURATION: 100 % | WEIGHT: 176.13 LBS | DIASTOLIC BLOOD PRESSURE: 64 MMHG

## 2021-07-26 DIAGNOSIS — E66.9 OBESITY, CLASS I, BMI 30.0-34.9 (SEE ACTUAL BMI): Primary | ICD-10-CM

## 2021-07-26 PROCEDURE — 1160F PR REVIEW ALL MEDS BY PRESCRIBER/CLIN PHARMACIST DOCUMENTED: ICD-10-PCS | Mod: CPTII,S$GLB,, | Performed by: INTERNAL MEDICINE

## 2021-07-26 PROCEDURE — 1160F RVW MEDS BY RX/DR IN RCRD: CPT | Mod: CPTII,S$GLB,, | Performed by: INTERNAL MEDICINE

## 2021-07-26 PROCEDURE — 1126F PR PAIN SEVERITY QUANTIFIED, NO PAIN PRESENT: ICD-10-PCS | Mod: CPTII,S$GLB,, | Performed by: INTERNAL MEDICINE

## 2021-07-26 PROCEDURE — 3008F PR BODY MASS INDEX (BMI) DOCUMENTED: ICD-10-PCS | Mod: CPTII,S$GLB,, | Performed by: INTERNAL MEDICINE

## 2021-07-26 PROCEDURE — 99999 PR PBB SHADOW E&M-EST. PATIENT-LVL III: CPT | Mod: PBBFAC,,, | Performed by: INTERNAL MEDICINE

## 2021-07-26 PROCEDURE — 99999 PR PBB SHADOW E&M-EST. PATIENT-LVL III: ICD-10-PCS | Mod: PBBFAC,,, | Performed by: INTERNAL MEDICINE

## 2021-07-26 PROCEDURE — 1159F MED LIST DOCD IN RCRD: CPT | Mod: CPTII,S$GLB,, | Performed by: INTERNAL MEDICINE

## 2021-07-26 PROCEDURE — 1159F PR MEDICATION LIST DOCUMENTED IN MEDICAL RECORD: ICD-10-PCS | Mod: CPTII,S$GLB,, | Performed by: INTERNAL MEDICINE

## 2021-07-26 PROCEDURE — 99213 OFFICE O/P EST LOW 20 MIN: CPT | Mod: S$GLB,,, | Performed by: INTERNAL MEDICINE

## 2021-07-26 PROCEDURE — 1126F AMNT PAIN NOTED NONE PRSNT: CPT | Mod: CPTII,S$GLB,, | Performed by: INTERNAL MEDICINE

## 2021-07-26 PROCEDURE — 99213 PR OFFICE/OUTPT VISIT, EST, LEVL III, 20-29 MIN: ICD-10-PCS | Mod: S$GLB,,, | Performed by: INTERNAL MEDICINE

## 2021-07-26 PROCEDURE — 3008F BODY MASS INDEX DOCD: CPT | Mod: CPTII,S$GLB,, | Performed by: INTERNAL MEDICINE

## 2021-07-26 RX ORDER — SEMAGLUTIDE 2.4 MG/.75ML
2.4 INJECTION, SOLUTION SUBCUTANEOUS
Qty: 3 ML | Refills: 1 | Status: SHIPPED | OUTPATIENT
Start: 2021-10-22 | End: 2021-09-17 | Stop reason: SDUPTHER

## 2021-07-26 RX ORDER — SEMAGLUTIDE 1 MG/.5ML
1 INJECTION, SOLUTION SUBCUTANEOUS
Qty: 2 ML | Refills: 0 | Status: SHIPPED | OUTPATIENT
Start: 2021-08-26 | End: 2021-11-09

## 2021-07-26 RX ORDER — SEMAGLUTIDE 0.5 MG/.5ML
0.5 INJECTION, SOLUTION SUBCUTANEOUS
Qty: 2 ML | Refills: 0 | Status: SHIPPED | OUTPATIENT
Start: 2021-07-26 | End: 2021-11-09

## 2021-07-26 RX ORDER — SEMAGLUTIDE 1.7 MG/.75ML
1.7 INJECTION, SOLUTION SUBCUTANEOUS
Qty: 3 ML | Refills: 0 | Status: SHIPPED | OUTPATIENT
Start: 2021-09-24 | End: 2021-11-09

## 2021-09-17 DIAGNOSIS — E66.9 OBESITY, CLASS I, BMI 30.0-34.9 (SEE ACTUAL BMI): ICD-10-CM

## 2021-09-17 RX ORDER — SEMAGLUTIDE 2.4 MG/.75ML
2.4 INJECTION, SOLUTION SUBCUTANEOUS
Qty: 3 ML | Refills: 2 | Status: SHIPPED | OUTPATIENT
Start: 2021-10-22 | End: 2021-11-09

## 2021-09-27 ENCOUNTER — LAB VISIT (OUTPATIENT)
Dept: INTERNAL MEDICINE | Facility: CLINIC | Age: 26
End: 2021-09-27

## 2021-09-27 DIAGNOSIS — R05.9 COUGH: ICD-10-CM

## 2021-09-27 DIAGNOSIS — R05.9 COUGH: Primary | ICD-10-CM

## 2021-09-27 LAB — SARS-COV-2 RDRP RESP QL NAA+PROBE: NEGATIVE

## 2021-09-27 PROCEDURE — U0002 COVID-19 LAB TEST NON-CDC: HCPCS | Performed by: PREVENTIVE MEDICINE

## 2021-09-30 ENCOUNTER — PATIENT MESSAGE (OUTPATIENT)
Dept: PLASTIC SURGERY | Facility: CLINIC | Age: 26
End: 2021-09-30

## 2021-09-30 DIAGNOSIS — Z72.0 NICOTINE ABUSE: Primary | ICD-10-CM

## 2021-09-30 DIAGNOSIS — N62 MACROMASTIA: Primary | ICD-10-CM

## 2021-10-22 ENCOUNTER — LAB VISIT (OUTPATIENT)
Dept: LAB | Facility: HOSPITAL | Age: 26
End: 2021-10-22
Payer: COMMERCIAL

## 2021-10-22 DIAGNOSIS — E53.8 VITAMIN B12 DEFICIENCY: ICD-10-CM

## 2021-10-22 DIAGNOSIS — E55.9 VITAMIN D DEFICIENCY: ICD-10-CM

## 2021-10-22 LAB — 25(OH)D3+25(OH)D2 SERPL-MCNC: 39 NG/ML (ref 30–96)

## 2021-10-22 PROCEDURE — 82306 VITAMIN D 25 HYDROXY: CPT | Performed by: NURSE PRACTITIONER

## 2021-10-22 PROCEDURE — 82607 VITAMIN B-12: CPT | Performed by: NURSE PRACTITIONER

## 2021-10-22 PROCEDURE — 36415 COLL VENOUS BLD VENIPUNCTURE: CPT | Performed by: NURSE PRACTITIONER

## 2021-10-25 ENCOUNTER — PATIENT MESSAGE (OUTPATIENT)
Dept: INTERNAL MEDICINE | Facility: CLINIC | Age: 26
End: 2021-10-25
Payer: COMMERCIAL

## 2021-10-25 LAB — VIT B12 SERPL-MCNC: 994 NG/L (ref 180–914)

## 2021-10-26 ENCOUNTER — PATIENT MESSAGE (OUTPATIENT)
Dept: INTERNAL MEDICINE | Facility: CLINIC | Age: 26
End: 2021-10-26
Payer: COMMERCIAL

## 2021-10-29 ENCOUNTER — PATIENT MESSAGE (OUTPATIENT)
Dept: BARIATRICS | Facility: CLINIC | Age: 26
End: 2021-10-29
Payer: COMMERCIAL

## 2021-10-29 RX ORDER — METOCLOPRAMIDE 10 MG/1
TABLET ORAL
Qty: 20 TABLET | Refills: 0 | Status: CANCELLED | OUTPATIENT
Start: 2021-10-29

## 2021-10-29 RX ORDER — ONDANSETRON 4 MG/1
4 TABLET, FILM COATED ORAL EVERY 8 HOURS PRN
Qty: 20 TABLET | Refills: 0 | Status: SHIPPED | OUTPATIENT
Start: 2021-10-29 | End: 2022-09-02 | Stop reason: SDUPTHER

## 2021-11-09 ENCOUNTER — OFFICE VISIT (OUTPATIENT)
Dept: BARIATRICS | Facility: CLINIC | Age: 26
End: 2021-11-09
Payer: COMMERCIAL

## 2021-11-09 VITALS
WEIGHT: 162.69 LBS | DIASTOLIC BLOOD PRESSURE: 72 MMHG | OXYGEN SATURATION: 99 % | BODY MASS INDEX: 27.93 KG/M2 | SYSTOLIC BLOOD PRESSURE: 131 MMHG | HEART RATE: 69 BPM

## 2021-11-09 DIAGNOSIS — E66.3 OVERWEIGHT (BMI 25.0-29.9): Primary | ICD-10-CM

## 2021-11-09 PROCEDURE — 3075F PR MOST RECENT SYSTOLIC BLOOD PRESS GE 130-139MM HG: ICD-10-PCS | Mod: CPTII,S$GLB,, | Performed by: INTERNAL MEDICINE

## 2021-11-09 PROCEDURE — 1160F RVW MEDS BY RX/DR IN RCRD: CPT | Mod: CPTII,S$GLB,, | Performed by: INTERNAL MEDICINE

## 2021-11-09 PROCEDURE — 99999 PR PBB SHADOW E&M-EST. PATIENT-LVL IV: CPT | Mod: PBBFAC,,, | Performed by: INTERNAL MEDICINE

## 2021-11-09 PROCEDURE — 99212 OFFICE O/P EST SF 10 MIN: CPT | Mod: S$GLB,,, | Performed by: INTERNAL MEDICINE

## 2021-11-09 PROCEDURE — 3044F HG A1C LEVEL LT 7.0%: CPT | Mod: CPTII,S$GLB,, | Performed by: INTERNAL MEDICINE

## 2021-11-09 PROCEDURE — 99212 PR OFFICE/OUTPT VISIT, EST, LEVL II, 10-19 MIN: ICD-10-PCS | Mod: S$GLB,,, | Performed by: INTERNAL MEDICINE

## 2021-11-09 PROCEDURE — 3078F PR MOST RECENT DIASTOLIC BLOOD PRESSURE < 80 MM HG: ICD-10-PCS | Mod: CPTII,S$GLB,, | Performed by: INTERNAL MEDICINE

## 2021-11-09 PROCEDURE — 1159F MED LIST DOCD IN RCRD: CPT | Mod: CPTII,S$GLB,, | Performed by: INTERNAL MEDICINE

## 2021-11-09 PROCEDURE — 3078F DIAST BP <80 MM HG: CPT | Mod: CPTII,S$GLB,, | Performed by: INTERNAL MEDICINE

## 2021-11-09 PROCEDURE — 3075F SYST BP GE 130 - 139MM HG: CPT | Mod: CPTII,S$GLB,, | Performed by: INTERNAL MEDICINE

## 2021-11-09 PROCEDURE — 1160F PR REVIEW ALL MEDS BY PRESCRIBER/CLIN PHARMACIST DOCUMENTED: ICD-10-PCS | Mod: CPTII,S$GLB,, | Performed by: INTERNAL MEDICINE

## 2021-11-09 PROCEDURE — 3008F PR BODY MASS INDEX (BMI) DOCUMENTED: ICD-10-PCS | Mod: CPTII,S$GLB,, | Performed by: INTERNAL MEDICINE

## 2021-11-09 PROCEDURE — 3044F PR MOST RECENT HEMOGLOBIN A1C LEVEL <7.0%: ICD-10-PCS | Mod: CPTII,S$GLB,, | Performed by: INTERNAL MEDICINE

## 2021-11-09 PROCEDURE — 99999 PR PBB SHADOW E&M-EST. PATIENT-LVL IV: ICD-10-PCS | Mod: PBBFAC,,, | Performed by: INTERNAL MEDICINE

## 2021-11-09 PROCEDURE — 1159F PR MEDICATION LIST DOCUMENTED IN MEDICAL RECORD: ICD-10-PCS | Mod: CPTII,S$GLB,, | Performed by: INTERNAL MEDICINE

## 2021-11-09 PROCEDURE — 3008F BODY MASS INDEX DOCD: CPT | Mod: CPTII,S$GLB,, | Performed by: INTERNAL MEDICINE

## 2021-11-09 RX ORDER — SEMAGLUTIDE 2.4 MG/.75ML
2.4 INJECTION, SOLUTION SUBCUTANEOUS
Qty: 3 ML | Refills: 2 | Status: SHIPPED | OUTPATIENT
Start: 2021-11-09 | End: 2021-12-27

## 2021-11-24 ENCOUNTER — PATIENT MESSAGE (OUTPATIENT)
Dept: BARIATRICS | Facility: CLINIC | Age: 26
End: 2021-11-24
Payer: COMMERCIAL

## 2021-11-24 RX ORDER — ONDANSETRON 4 MG/1
4 TABLET, FILM COATED ORAL EVERY 8 HOURS PRN
Qty: 20 TABLET | Refills: 0 | Status: CANCELLED | OUTPATIENT
Start: 2021-11-24

## 2021-12-02 ENCOUNTER — LAB VISIT (OUTPATIENT)
Dept: LAB | Facility: HOSPITAL | Age: 26
End: 2021-12-02
Attending: SURGERY
Payer: COMMERCIAL

## 2021-12-02 DIAGNOSIS — Z72.0 NICOTINE ABUSE: ICD-10-CM

## 2021-12-02 PROCEDURE — 80323 ALKALOIDS NOS: CPT | Performed by: SURGERY

## 2021-12-02 PROCEDURE — 36415 COLL VENOUS BLD VENIPUNCTURE: CPT | Performed by: SURGERY

## 2021-12-04 LAB
COTININE SERPL-MCNC: <3 NG/ML
NICOTINE SERPL-MCNC: <3 NG/ML

## 2021-12-09 ENCOUNTER — PATIENT MESSAGE (OUTPATIENT)
Dept: PLASTIC SURGERY | Facility: CLINIC | Age: 26
End: 2021-12-09
Payer: COMMERCIAL

## 2021-12-10 ENCOUNTER — ANESTHESIA EVENT (OUTPATIENT)
Dept: SURGERY | Facility: HOSPITAL | Age: 26
End: 2021-12-10
Payer: COMMERCIAL

## 2021-12-14 ENCOUNTER — TELEPHONE (OUTPATIENT)
Dept: PLASTIC SURGERY | Facility: CLINIC | Age: 26
End: 2021-12-14
Payer: COMMERCIAL

## 2021-12-22 ENCOUNTER — HOSPITAL ENCOUNTER (EMERGENCY)
Facility: OTHER | Age: 26
Discharge: HOME | End: 2021-12-22
Attending: EMERGENCY MEDICINE
Payer: COMMERCIAL

## 2021-12-22 VITALS
HEIGHT: 64 IN | HEART RATE: 129 BPM | BODY MASS INDEX: 27.31 KG/M2 | RESPIRATION RATE: 19 BRPM | DIASTOLIC BLOOD PRESSURE: 71 MMHG | SYSTOLIC BLOOD PRESSURE: 109 MMHG | TEMPERATURE: 99 F | WEIGHT: 160 LBS | OXYGEN SATURATION: 100 %

## 2021-12-22 DIAGNOSIS — R07.9 CHEST PAIN: ICD-10-CM

## 2021-12-22 DIAGNOSIS — U07.1 COVID-19 VIRUS INFECTION: Primary | ICD-10-CM

## 2021-12-22 DIAGNOSIS — N30.90 CYSTITIS: ICD-10-CM

## 2021-12-22 LAB
B-HCG UR QL: NEGATIVE
BACTERIA #/AREA URNS HPF: NORMAL /HPF
BILIRUB UR QL STRIP: NEGATIVE
CLARITY UR: CLEAR
COLOR UR: YELLOW
CTP QC/QA: YES
CTP QC/QA: YES
GLUCOSE UR QL STRIP: NEGATIVE
HGB UR QL STRIP: ABNORMAL
HYALINE CASTS #/AREA URNS LPF: 0 /LPF
KETONES UR QL STRIP: ABNORMAL
LEUKOCYTE ESTERASE UR QL STRIP: ABNORMAL
MICROSCOPIC COMMENT: NORMAL
NITRITE UR QL STRIP: NEGATIVE
PH UR STRIP: 7 [PH] (ref 5–8)
PROT UR QL STRIP: NEGATIVE
RBC #/AREA URNS HPF: 2 /HPF (ref 0–4)
SARS-COV-2 RDRP RESP QL NAA+PROBE: POSITIVE
SP GR UR STRIP: 1.01 (ref 1–1.03)
SQUAMOUS #/AREA URNS HPF: 8 /HPF
URN SPEC COLLECT METH UR: ABNORMAL
UROBILINOGEN UR STRIP-ACNC: NEGATIVE EU/DL
WBC #/AREA URNS HPF: 5 /HPF (ref 0–5)

## 2021-12-22 PROCEDURE — 99283 EMERGENCY DEPT VISIT LOW MDM: CPT | Mod: 25

## 2021-12-22 PROCEDURE — 93010 EKG 12-LEAD: ICD-10-PCS | Mod: ,,, | Performed by: INTERNAL MEDICINE

## 2021-12-22 PROCEDURE — 81025 URINE PREGNANCY TEST: CPT | Performed by: EMERGENCY MEDICINE

## 2021-12-22 PROCEDURE — 93005 ELECTROCARDIOGRAM TRACING: CPT

## 2021-12-22 PROCEDURE — 93010 ELECTROCARDIOGRAM REPORT: CPT | Mod: ,,, | Performed by: INTERNAL MEDICINE

## 2021-12-22 PROCEDURE — 81000 URINALYSIS NONAUTO W/SCOPE: CPT | Performed by: EMERGENCY MEDICINE

## 2021-12-22 PROCEDURE — U0002 COVID-19 LAB TEST NON-CDC: HCPCS | Performed by: EMERGENCY MEDICINE

## 2021-12-22 RX ORDER — DIPHENHYDRAMINE HYDROCHLORIDE 50 MG/ML
25 INJECTION INTRAMUSCULAR; INTRAVENOUS ONCE AS NEEDED
Status: DISCONTINUED | OUTPATIENT
Start: 2021-12-22 | End: 2021-12-22

## 2021-12-22 RX ORDER — ALBUTEROL SULFATE 90 UG/1
2 AEROSOL, METERED RESPIRATORY (INHALATION)
Status: DISCONTINUED | OUTPATIENT
Start: 2021-12-22 | End: 2021-12-22

## 2021-12-22 RX ORDER — ONDANSETRON 4 MG/1
4 TABLET, ORALLY DISINTEGRATING ORAL ONCE AS NEEDED
Status: DISCONTINUED | OUTPATIENT
Start: 2021-12-22 | End: 2021-12-22

## 2021-12-22 RX ORDER — SODIUM CHLORIDE 0.9 % (FLUSH) 0.9 %
10 SYRINGE (ML) INJECTION
Status: DISCONTINUED | OUTPATIENT
Start: 2021-12-22 | End: 2021-12-22

## 2021-12-22 RX ORDER — ACETAMINOPHEN 325 MG/1
650 TABLET ORAL ONCE AS NEEDED
Status: DISCONTINUED | OUTPATIENT
Start: 2021-12-22 | End: 2021-12-22

## 2021-12-22 RX ORDER — EPINEPHRINE 0.3 MG/.3ML
0.3 INJECTION SUBCUTANEOUS
Status: DISCONTINUED | OUTPATIENT
Start: 2021-12-22 | End: 2021-12-22

## 2021-12-22 RX ORDER — NITROFURANTOIN 25; 75 MG/1; MG/1
100 CAPSULE ORAL 2 TIMES DAILY
Qty: 14 CAPSULE | Refills: 0 | Status: SHIPPED | OUTPATIENT
Start: 2021-12-22 | End: 2021-12-30

## 2021-12-22 RX ORDER — PROPRANOLOL HYDROCHLORIDE 10 MG/1
20 TABLET ORAL
Status: DISCONTINUED | OUTPATIENT
Start: 2021-12-22 | End: 2021-12-22

## 2021-12-22 NOTE — ED PROVIDER NOTES
"     Source of History:  Patient    Chief complaint:  COVID-19 Concerns (Pt c/o bodyaches, chills, bilateral side pain, fatigue, cough x 4 days. Also reports chest pain.)      HPI:  Rhea Gilman is a 26 y.o. 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  Denies any significant shortness of breath.    Also feels lot of suprapubic pressure and feels she has urinary tract infection.    This is the extent to the patients complaints today here in the emergency department.    ROS: As per HPI and below:  Constitutional:  Positive for chills  Eyes: No visual changes.  ENT:  Congestion No ear pain    Cardiovascular: No chest pain.  Respiratory:  Cough  GI: No abdominal pain.  No nausea or vomiting.  Genitourinary: No abnormal urination.  Neurologic:  No focal weakness.  No numbness.  MSK: no back pain.  Integument: No rashes or lesions.  Hematologic: No easy bruising.  Endocrine: No excessive thirst or urination.    Review of patient's allergies indicates:   Allergen Reactions    Amoxicillin Hives       PMH:  As per HPI and below:  Past Medical History:   Diagnosis Date    Anemia     Atrial fibrillation     converted [facility] channel [facility] at [facility]    Coronary artery disease     Migraine headache      No past surgical history on file.    Social History     Tobacco Use    Smoking status: Former Smoker     Packs/day: 0.00     Years: 0.00     Pack years: 0.00     Types: Cigarettes     Quit date: 3/18/2021     Years since quittin.7    Smokeless tobacco: Never Used    Tobacco comment: 1 pack 3 days   Substance Use Topics    Alcohol use: No    Drug use: No       Physical Exam:    /71 (BP Location: Left arm, Patient Position: Sitting)   Pulse (!) 129   Temp 99.4 °F (37.4 °C) (Oral)   Resp 19   Ht 5' 4" (1.626 m)   Wt 72.6 kg (160 lb)   SpO2 100%   BMI 27.46 kg/m²   Nursing note and vital signs " reviewed.  Appearance:  No acute distress.  Comfortable.  Head: Normocephalic.  Atraumatic.    Eyes: No conjunctival injection.  No swelling.  Chest/Respiratory: Normal work of breathing.  No retractions.  No stridor.  Cardiovascular: Tachycardia.  No appearance of shock.  No edema.  Abdomen:  No distention.    Musculoskeletal:  No deformities.  Normal range of motion.    Skin:  No rashes.  Good turgor.  Neurologic:  Normal movement and ambulation.  Mental Status:  Alert and oriented x 3.  Appropriate, conversant.     Labs that have been ordered have been independently reviewed and interpreted by myself.    I decided to obtain the patient's medical records.  Summary of Medical Records:      MDM:    26 y.o. female with COVID infection.  Vital signs are normal.  The patient is not hypoxic and has no respiratory distress.  Do not suspect sepsis or pneumonia.  Do not feel any further workup is indicated.  The patient was offered the monoclonal antibody infusion which they are unable to stay today but requests a referral for outpatient infusion. .  Strict return precautions and self isolation recommendations are given to the patient.    ED Course as of 12/22/21 1045   Wed Dec 22, 2021   0856 SARS-CoV-2 RNA, Amplification, Qual(!): Positive [SM]   0922 Pulse(!): 129  I suspect 2nd to infection. Oxygen sats 100%.  Patient also has a history of tachycardia and has a prescription for propranolol in which she is supposed to take every day but has not taken in the last 3 days. [SM]   0923 SpO2: 100 % [SM]   0939 EKG 12-lead  EKG independently interpreted by myself shows sinus tachycardia rate of 119, normal intervals, narrow QRS, no acute ST T wave abnormalities.  Overall impression sinus tachycardia otherwise normal EKG. [SM]   1040 Occult Blood UA(!): 3+ [SM]   1040 Leukocytes, UA(!): 1+  I re-evaluated patient she stated she needed to leave.  I did discuss that we could do the infusion here but she would prefer just the  outpatient referral.  At the time I was seeing her I had not received the results urinalysis.  She stated that I can send a prescription for antibiotics to the pharmacy if it is and will call her with results.    No further workup indicated based on their complaints or examination today.    Patient to be discharged home in stable condition. Diagnosis and treatment plan explained to patient and/or family member who is at bedside. I have answered all questions and the patient is satisfied with the plan of care. Strict return precautions given. The patient demonstrates understanding of the care plan. [SM]   1041 Due to staffing issue the patient also did not receive her propranolol prior to discharge.  I recommended that she take it at home. [SM]      ED Course User Index  [SM] Angel Gibbons DO                 Diagnostic Impression:    1. COVID-19 virus infection    2. Chest pain    3. Cystitis         ED Disposition Condition    Discharge Stable          ED Prescriptions     Medication Sig Dispense Start Date End Date Auth. Provider    nitrofurantoin, macrocrystal-monohydrate, (MACROBID) 100 MG capsule Take 1 capsule (100 mg total) by mouth 2 (two) times daily. for 7 days 14 capsule 12/22/2021 12/29/2021 Angel Gibbons,         Follow-up Information     Follow up With Specialties Details Why Contact Info    [facility] Emergency Dept Emergency Medicine  If symptoms worsen [address]  [facility] [id number] [phone]           Angel Gibbons DO  12/22/21 [id number]

## 2021-12-23 ENCOUNTER — ANESTHESIA (OUTPATIENT)
Dept: SURGERY | Facility: HOSPITAL | Age: 26
End: 2021-12-23
Payer: COMMERCIAL

## 2021-12-23 ENCOUNTER — PATIENT MESSAGE (OUTPATIENT)
Dept: BARIATRICS | Facility: CLINIC | Age: 26
End: 2021-12-23
Payer: COMMERCIAL

## 2021-12-23 DIAGNOSIS — E88.819 INSULIN RESISTANCE: Primary | ICD-10-CM

## 2021-12-27 ENCOUNTER — PATIENT MESSAGE (OUTPATIENT)
Dept: BARIATRICS | Facility: CLINIC | Age: 26
End: 2021-12-27
Payer: COMMERCIAL

## 2021-12-27 DIAGNOSIS — E88.819 INSULIN RESISTANCE: ICD-10-CM

## 2021-12-27 RX ORDER — SEMAGLUTIDE 1.34 MG/ML
1 INJECTION, SOLUTION SUBCUTANEOUS
Qty: 3 PEN | Refills: 1 | Status: SHIPPED | OUTPATIENT
Start: 2021-12-27 | End: 2021-12-27 | Stop reason: SDUPTHER

## 2021-12-27 RX ORDER — SEMAGLUTIDE 1.34 MG/ML
1 INJECTION, SOLUTION SUBCUTANEOUS
Qty: 3 PEN | Refills: 1 | Status: SHIPPED | OUTPATIENT
Start: 2021-12-27 | End: 2022-03-17 | Stop reason: SDUPTHER

## 2021-12-30 ENCOUNTER — TELEPHONE (OUTPATIENT)
Dept: PLASTIC SURGERY | Facility: CLINIC | Age: 26
End: 2021-12-30
Payer: COMMERCIAL

## 2021-12-30 ENCOUNTER — PATIENT MESSAGE (OUTPATIENT)
Dept: PLASTIC SURGERY | Facility: CLINIC | Age: 26
End: 2021-12-30
Payer: COMMERCIAL

## 2022-01-03 DIAGNOSIS — N62 MACROMASTIA: Primary | ICD-10-CM

## 2022-01-05 ENCOUNTER — LAB VISIT (OUTPATIENT)
Dept: LAB | Facility: HOSPITAL | Age: 27
End: 2022-01-05
Attending: SURGERY
Payer: COMMERCIAL

## 2022-01-05 DIAGNOSIS — Z01.818 PRE-OP TESTING: ICD-10-CM

## 2022-01-05 LAB
ALBUMIN SERPL BCP-MCNC: 3.9 G/DL (ref 3.5–5.2)
ALP SERPL-CCNC: 39 U/L (ref 55–135)
ALT SERPL W/O P-5'-P-CCNC: 11 U/L (ref 10–44)
ANION GAP SERPL CALC-SCNC: 8 MMOL/L (ref 8–16)
AST SERPL-CCNC: 12 U/L (ref 10–40)
BASOPHILS # BLD AUTO: 0.06 K/UL (ref 0–0.2)
BASOPHILS NFR BLD: 1.1 % (ref 0–1.9)
BILIRUB SERPL-MCNC: 0.5 MG/DL (ref 0.1–1)
BUN SERPL-MCNC: 8 MG/DL (ref 6–20)
CALCIUM SERPL-MCNC: 8.8 MG/DL (ref 8.7–10.5)
CHLORIDE SERPL-SCNC: 105 MMOL/L (ref 95–110)
CO2 SERPL-SCNC: 25 MMOL/L (ref 23–29)
CREAT SERPL-MCNC: 0.7 MG/DL (ref 0.5–1.4)
DIFFERENTIAL METHOD: ABNORMAL
EOSINOPHIL # BLD AUTO: 0.5 K/UL (ref 0–0.5)
EOSINOPHIL NFR BLD: 9.7 % (ref 0–8)
ERYTHROCYTE [DISTWIDTH] IN BLOOD BY AUTOMATED COUNT: 13.1 % (ref 11.5–14.5)
EST. GFR  (AFRICAN AMERICAN): >60 ML/MIN/1.73 M^2
EST. GFR  (NON AFRICAN AMERICAN): >60 ML/MIN/1.73 M^2
GLUCOSE SERPL-MCNC: 90 MG/DL (ref 70–110)
HCT VFR BLD AUTO: 39 % (ref 37–48.5)
HGB BLD-MCNC: 12.5 G/DL (ref 12–16)
IMM GRANULOCYTES # BLD AUTO: 0.01 K/UL (ref 0–0.04)
IMM GRANULOCYTES NFR BLD AUTO: 0.2 % (ref 0–0.5)
LYMPHOCYTES # BLD AUTO: 2 K/UL (ref 1–4.8)
LYMPHOCYTES NFR BLD: 35.6 % (ref 18–48)
MCH RBC QN AUTO: 31.3 PG (ref 27–31)
MCHC RBC AUTO-ENTMCNC: 32.1 G/DL (ref 32–36)
MCV RBC AUTO: 98 FL (ref 82–98)
MONOCYTES # BLD AUTO: 0.3 K/UL (ref 0.3–1)
MONOCYTES NFR BLD: 5.4 % (ref 4–15)
NEUTROPHILS # BLD AUTO: 2.7 K/UL (ref 1.8–7.7)
NEUTROPHILS NFR BLD: 48 % (ref 38–73)
NRBC BLD-RTO: 0 /100 WBC
PLATELET # BLD AUTO: 320 K/UL (ref 150–450)
PMV BLD AUTO: 10 FL (ref 9.2–12.9)
POTASSIUM SERPL-SCNC: 3.9 MMOL/L (ref 3.5–5.1)
PROT SERPL-MCNC: 6.7 G/DL (ref 6–8.4)
RBC # BLD AUTO: 4 M/UL (ref 4–5.4)
SODIUM SERPL-SCNC: 138 MMOL/L (ref 136–145)
WBC # BLD AUTO: 5.56 K/UL (ref 3.9–12.7)

## 2022-01-05 PROCEDURE — 80053 COMPREHEN METABOLIC PANEL: CPT | Performed by: SURGERY

## 2022-01-05 PROCEDURE — 85025 COMPLETE CBC W/AUTO DIFF WBC: CPT | Performed by: SURGERY

## 2022-01-05 PROCEDURE — 36415 COLL VENOUS BLD VENIPUNCTURE: CPT | Performed by: SURGERY

## 2022-01-05 RX ORDER — SODIUM CHLORIDE 0.9 % (FLUSH) 0.9 %
10 SYRINGE (ML) INJECTION
Status: CANCELLED | OUTPATIENT
Start: 2022-01-05

## 2022-01-06 ENCOUNTER — HOSPITAL ENCOUNTER (OUTPATIENT)
Facility: HOSPITAL | Age: 27
Discharge: HOME | End: 2022-01-06
Attending: SURGERY | Admitting: SURGERY
Payer: COMMERCIAL

## 2022-01-06 VITALS
SYSTOLIC BLOOD PRESSURE: 119 MMHG | RESPIRATION RATE: 16 BRPM | OXYGEN SATURATION: 100 % | BODY MASS INDEX: 26.98 KG/M2 | HEART RATE: 68 BPM | TEMPERATURE: 98 F | DIASTOLIC BLOOD PRESSURE: 77 MMHG | WEIGHT: 158 LBS | HEIGHT: 64 IN

## 2022-01-06 DIAGNOSIS — N62 MACROMASTIA: Primary | ICD-10-CM

## 2022-01-06 LAB
B-HCG UR QL: NEGATIVE
CTP QC/QA: YES

## 2022-01-06 PROCEDURE — 37000008 HC ANESTHESIA 1ST 15 MINUTES: Performed by: SURGERY

## 2022-01-06 PROCEDURE — 36000706: Performed by: SURGERY

## 2022-01-06 PROCEDURE — 71000033 HC RECOVERY, INTIAL HOUR: Performed by: SURGERY

## 2022-01-06 PROCEDURE — 36000707: Performed by: SURGERY

## 2022-01-06 PROCEDURE — 63600175 PHARM REV CODE 636 W HCPCS: Performed by: NURSE ANESTHETIST, CERTIFIED REGISTERED

## 2022-01-06 PROCEDURE — 25000003 PHARM REV CODE 250: Performed by: SURGERY

## 2022-01-06 PROCEDURE — 27000080 OPTIME MED/SURG SUP & DEVICES GENERAL CLASSIFICATION: Performed by: SURGERY

## 2022-01-06 PROCEDURE — 00402 ANES INTEG SYS RCNSTV BREAST: CPT | Performed by: SURGERY

## 2022-01-06 PROCEDURE — 27201423 OPTIME MED/SURG SUP & DEVICES STERILE SUPPLY: Performed by: SURGERY

## 2022-01-06 PROCEDURE — 25000003 PHARM REV CODE 250: Performed by: STUDENT IN AN ORGANIZED HEALTH CARE EDUCATION/TRAINING PROGRAM

## 2022-01-06 PROCEDURE — 81025 URINE PREGNANCY TEST: CPT | Performed by: SURGERY

## 2022-01-06 PROCEDURE — C1789 PROSTHESIS, BREAST, IMP: HCPCS | Performed by: SURGERY

## 2022-01-06 PROCEDURE — 19318 BREAST REDUCTION: CPT | Mod: 50,,, | Performed by: SURGERY

## 2022-01-06 PROCEDURE — 25000003 PHARM REV CODE 250: Performed by: NURSE ANESTHETIST, CERTIFIED REGISTERED

## 2022-01-06 PROCEDURE — 63600175 PHARM REV CODE 636 W HCPCS: Performed by: STUDENT IN AN ORGANIZED HEALTH CARE EDUCATION/TRAINING PROGRAM

## 2022-01-06 PROCEDURE — 19318 PR REDUCTION OF LARGE BREAST: ICD-10-PCS | Mod: 50,,, | Performed by: SURGERY

## 2022-01-06 PROCEDURE — 37000009 HC ANESTHESIA EA ADD 15 MINS: Performed by: SURGERY

## 2022-01-06 PROCEDURE — 71000015 HC POSTOP RECOV 1ST HR: Performed by: SURGERY

## 2022-01-06 PROCEDURE — 71000039 HC RECOVERY, EACH ADD'L HOUR: Performed by: SURGERY

## 2022-01-06 RX ORDER — OXYCODONE AND ACETAMINOPHEN 5; 325 MG/1; MG/1
1 TABLET ORAL EVERY 4 HOURS PRN
Qty: 31 TABLET | Refills: 0 | Status: SHIPPED | OUTPATIENT
Start: 2022-01-06 | End: 2022-04-26

## 2022-01-06 RX ORDER — PROPOFOL 10 MG/ML
VIAL (ML) INTRAVENOUS
Status: DISCONTINUED | OUTPATIENT
Start: 2022-01-06 | End: 2022-01-06

## 2022-01-06 RX ORDER — MUPIROCIN 20 MG/G
OINTMENT TOPICAL 2 TIMES DAILY
Status: CANCELLED | OUTPATIENT
Start: 2022-01-06 | End: 2022-01-11

## 2022-01-06 RX ORDER — MEPERIDINE HYDROCHLORIDE 50 MG/ML
12.5 INJECTION INTRAMUSCULAR; INTRAVENOUS; SUBCUTANEOUS EVERY 10 MIN PRN
Status: DISCONTINUED | OUTPATIENT
Start: 2022-01-06 | End: 2022-01-06 | Stop reason: HOSPADM

## 2022-01-06 RX ORDER — SULFAMETHOXAZOLE AND TRIMETHOPRIM 800; 160 MG/1; MG/1
1 TABLET ORAL 2 TIMES DAILY
Qty: 20 TABLET | Refills: 0 | Status: SHIPPED | OUTPATIENT
Start: 2022-01-06 | End: 2022-01-17

## 2022-01-06 RX ORDER — CLINDAMYCIN PHOSPHATE 900 MG/50ML
900 INJECTION, SOLUTION INTRAVENOUS
Status: COMPLETED | OUTPATIENT
Start: 2022-01-06 | End: 2022-01-06

## 2022-01-06 RX ORDER — HYDROCODONE BITARTRATE AND ACETAMINOPHEN 5; 325 MG/1; MG/1
1 TABLET ORAL EVERY 4 HOURS PRN
Status: CANCELLED | OUTPATIENT
Start: 2022-01-06

## 2022-01-06 RX ORDER — ONDANSETRON 2 MG/ML
4 INJECTION INTRAMUSCULAR; INTRAVENOUS DAILY PRN
Status: DISCONTINUED | OUTPATIENT
Start: 2022-01-06 | End: 2022-01-06 | Stop reason: HOSPADM

## 2022-01-06 RX ORDER — LIDOCAINE HYDROCHLORIDE 5 MG/ML
INJECTION, SOLUTION INFILTRATION; INTRAVENOUS
Status: DISCONTINUED | OUTPATIENT
Start: 2022-01-06 | End: 2022-01-06 | Stop reason: HOSPADM

## 2022-01-06 RX ORDER — OXYCODONE HYDROCHLORIDE 5 MG/1
5 TABLET ORAL
Status: DISCONTINUED | OUTPATIENT
Start: 2022-01-06 | End: 2022-01-06 | Stop reason: HOSPADM

## 2022-01-06 RX ORDER — DIPHENHYDRAMINE HYDROCHLORIDE 50 MG/ML
INJECTION INTRAMUSCULAR; INTRAVENOUS
Status: DISCONTINUED | OUTPATIENT
Start: 2022-01-06 | End: 2022-01-06

## 2022-01-06 RX ORDER — FENTANYL CITRATE 50 UG/ML
INJECTION, SOLUTION INTRAMUSCULAR; INTRAVENOUS
Status: DISCONTINUED | OUTPATIENT
Start: 2022-01-06 | End: 2022-01-06

## 2022-01-06 RX ORDER — DEXAMETHASONE SODIUM PHOSPHATE 4 MG/ML
INJECTION, SOLUTION INTRA-ARTICULAR; INTRALESIONAL; INTRAMUSCULAR; INTRAVENOUS; SOFT TISSUE
Status: DISCONTINUED | OUTPATIENT
Start: 2022-01-06 | End: 2022-01-06

## 2022-01-06 RX ORDER — LIDOCAINE HYDROCHLORIDE 10 MG/ML
1 INJECTION, SOLUTION EPIDURAL; INFILTRATION; INTRACAUDAL; PERINEURAL ONCE
Status: DISCONTINUED | OUTPATIENT
Start: 2022-01-06 | End: 2023-02-06

## 2022-01-06 RX ORDER — ONDANSETRON 2 MG/ML
INJECTION INTRAMUSCULAR; INTRAVENOUS
Status: DISCONTINUED | OUTPATIENT
Start: 2022-01-06 | End: 2022-01-06

## 2022-01-06 RX ORDER — FAMOTIDINE 10 MG/ML
INJECTION INTRAVENOUS
Status: DISCONTINUED | OUTPATIENT
Start: 2022-01-06 | End: 2022-01-06

## 2022-01-06 RX ORDER — HYDROMORPHONE HYDROCHLORIDE 1 MG/ML
0.2 INJECTION, SOLUTION INTRAMUSCULAR; INTRAVENOUS; SUBCUTANEOUS EVERY 5 MIN PRN
Status: COMPLETED | OUTPATIENT
Start: 2022-01-06 | End: 2022-01-06

## 2022-01-06 RX ORDER — SUCCINYLCHOLINE CHLORIDE 20 MG/ML
INJECTION INTRAMUSCULAR; INTRAVENOUS
Status: DISCONTINUED | OUTPATIENT
Start: 2022-01-06 | End: 2022-01-06

## 2022-01-06 RX ORDER — ONDANSETRON 2 MG/ML
4 INJECTION INTRAMUSCULAR; INTRAVENOUS EVERY 12 HOURS PRN
Status: CANCELLED | OUTPATIENT
Start: 2022-01-06

## 2022-01-06 RX ORDER — DIPHENHYDRAMINE HYDROCHLORIDE 50 MG/ML
12.5 INJECTION INTRAMUSCULAR; INTRAVENOUS
Status: DISCONTINUED | OUTPATIENT
Start: 2022-01-06 | End: 2022-01-06 | Stop reason: HOSPADM

## 2022-01-06 RX ORDER — SODIUM CHLORIDE 0.9 % (FLUSH) 0.9 %
10 SYRINGE (ML) INJECTION
Status: DISCONTINUED | OUTPATIENT
Start: 2022-01-06 | End: 2022-01-06 | Stop reason: HOSPADM

## 2022-01-06 RX ORDER — ACETAMINOPHEN 10 MG/ML
INJECTION, SOLUTION INTRAVENOUS
Status: DISCONTINUED | OUTPATIENT
Start: 2022-01-06 | End: 2022-01-06

## 2022-01-06 RX ORDER — ROCURONIUM BROMIDE 10 MG/ML
INJECTION, SOLUTION INTRAVENOUS
Status: DISCONTINUED | OUTPATIENT
Start: 2022-01-06 | End: 2022-01-06

## 2022-01-06 RX ORDER — LIDOCAINE HCL/PF 100 MG/5ML
SYRINGE (ML) INTRAVENOUS
Status: DISCONTINUED | OUTPATIENT
Start: 2022-01-06 | End: 2022-01-06

## 2022-01-06 RX ORDER — MUPIROCIN 20 MG/G
OINTMENT TOPICAL
Status: DISCONTINUED | OUTPATIENT
Start: 2022-01-06 | End: 2023-02-06

## 2022-01-06 RX ADMIN — HYDROMORPHONE HYDROCHLORIDE 0.2 MG: 1 INJECTION, SOLUTION INTRAMUSCULAR; INTRAVENOUS; SUBCUTANEOUS at 04:01

## 2022-01-06 RX ADMIN — DIPHENHYDRAMINE HYDROCHLORIDE 6.25 MG: 50 INJECTION, SOLUTION INTRAMUSCULAR; INTRAVENOUS at 01:01

## 2022-01-06 RX ADMIN — LIDOCAINE HYDROCHLORIDE 50 MG: 20 INJECTION, SOLUTION INTRAVENOUS at 01:01

## 2022-01-06 RX ADMIN — HYDROMORPHONE HYDROCHLORIDE 0.2 MG: 1 INJECTION, SOLUTION INTRAMUSCULAR; INTRAVENOUS; SUBCUTANEOUS at 05:01

## 2022-01-06 RX ADMIN — ROCURONIUM BROMIDE 5 MG: 10 INJECTION, SOLUTION INTRAVENOUS at 01:01

## 2022-01-06 RX ADMIN — SUCCINYLCHOLINE CHLORIDE 160 MG: 20 INJECTION, SOLUTION INTRAMUSCULAR; INTRAVENOUS at 01:01

## 2022-01-06 RX ADMIN — ONDANSETRON 4 MG: 2 INJECTION INTRAMUSCULAR; INTRAVENOUS at 01:01

## 2022-01-06 RX ADMIN — DEXAMETHASONE SODIUM PHOSPHATE 8 MG: 4 INJECTION, SOLUTION INTRAMUSCULAR; INTRAVENOUS at 01:01

## 2022-01-06 RX ADMIN — HYDROMORPHONE HYDROCHLORIDE 0.2 MG: 1 INJECTION, SOLUTION INTRAMUSCULAR; INTRAVENOUS; SUBCUTANEOUS at 03:01

## 2022-01-06 RX ADMIN — ACETAMINOPHEN 1000 MG: 10 INJECTION, SOLUTION INTRAVENOUS at 12:01

## 2022-01-06 RX ADMIN — PROPOFOL 180 MG: 10 INJECTION, EMULSION INTRAVENOUS at 01:01

## 2022-01-06 RX ADMIN — FENTANYL CITRATE 100 MCG: 50 INJECTION INTRAMUSCULAR; INTRAVENOUS at 01:01

## 2022-01-06 RX ADMIN — FENTANYL CITRATE 50 MCG: 50 INJECTION INTRAMUSCULAR; INTRAVENOUS at 01:01

## 2022-01-06 RX ADMIN — FENTANYL CITRATE 50 MCG: 50 INJECTION INTRAMUSCULAR; INTRAVENOUS at 02:01

## 2022-01-06 RX ADMIN — CLINDAMYCIN IN 5 PERCENT DEXTROSE 900 MG: 18 INJECTION, SOLUTION INTRAVENOUS at 12:01

## 2022-01-06 RX ADMIN — SODIUM CHLORIDE, SODIUM LACTATE, POTASSIUM CHLORIDE, AND CALCIUM CHLORIDE: .6; .31; .03; .02 INJECTION, SOLUTION INTRAVENOUS at 12:01

## 2022-01-06 RX ADMIN — OXYCODONE HYDROCHLORIDE 5 MG: 5 TABLET ORAL at 04:01

## 2022-01-06 RX ADMIN — FAMOTIDINE 20 MG: 10 INJECTION, SOLUTION INTRAVENOUS at 01:01

## 2022-01-06 NOTE — PATIENT INSTRUCTIONS
Post Op Bilateral Breast Reduction Discharge Instructions:     - Apply Bacitracin Ointment to nipples 3 times daily and cover with nonstick dressing. Bacitracin can be bought over the counter without a prescription needed.   - Shower in 48 hours with soap and water, then daily.   - Please strip drains and record output every 4 hours OR when bulb is half full.   - Wear Bra at all times, other than shower. Okay to remove for washing when/if soiled  - No lifting, pushing, pulling greater than 5 pounds until seen in clinic for first post op appointment. No sweating for 2 weeks following surgery.   - Follow up in clinic with Dr. Peterson Schumacher or Rosibel Foley PA-C x 1 week following surgery, call for appointment [phone]  - There is a doctor on call for Plastic Surgery at all times of the day and night, please attempt to contact the on call provider with any non emergent questions or concerns prior to reporting to the ED. You can reach the on call provider by calling [phone] and ask for Plastic Surgery Fellow on call.   - Please call the clinic with any questions or concerns prior to their next visit at [phone]

## 2022-01-06 NOTE — DISCHARGE SUMMARY
[facility]  Discharge Note  Short Stay    Procedure(s) (LRB):  Wan mammoplasty reduction (Bilateral)    OUTCOME: Patient tolerated treatment/procedure well without complication and is now ready for discharge.    DISPOSITION: Home or Self Care    FINAL DIAGNOSIS:  Macromastia    FOLLOWUP: In clinic    DISCHARGE INSTRUCTIONS:    Discharge Procedure Orders   Diet general     Call MD for:  hives     Call MD for:  difficulty breathing, headache or visual disturbances     Call MD for:  persistent nausea and vomiting     Call MD for:  severe uncontrolled pain     Call MD for:  redness, tenderness, or signs of infection (pain, swelling, redness, odor or green/yellow discharge around incision site)     Call MD for:  persistent dizziness or light-headedness     Wound care routine (specify)   Order Comments: Wound care routine: Apply Bacitracin to nipples 3 times daily and cover with nonstick dressing. Shower in 48 hours with soap and water then daily.     Other restrictions (specify):   Order Comments: No lifting, pushing or pulling> 5 pounds x 2 weeks. No sweating x 2 weeks         Clinical Reference Documents Added to Patient Instructions       Document    BREAST REDUCTION DISCHARGE INSTRUCTIONS (ENGLISH)    GENERAL ANESTHESIA DISCHARGE INSTRUCTIONS (ENGLISH)          TIME SPENT ON DISCHARGE: [facility] minutes[facility]  [facility]  Short Stay    Procedure(s) (LRB):  Wan mammoplasty reduction (Bilateral)    OUTCOME: Patient tolerated treatment/procedure well without complication and is now ready for discharge.    DISPOSITION: Home or Self Care    FINAL DIAGNOSIS: Macromastia    FOLLOWUP: In clinic    DISCHARGE INSTRUCTIONS:    Discharge Procedure Orders   Diet general     Call MD for:  hives     Call MD for:  difficulty breathing, headache or visual disturbances     Call MD for:  persistent nausea and vomiting     Call MD for:  severe uncontrolled pain     Call MD for:  redness, tenderness, or  signs of infection (pain, swelling, redness, odor or green/yellow discharge around incision site)     Call MD for:  persistent dizziness or light-headedness     Wound care routine (specify)   Order Comments: Wound care routine: Apply Bacitracin to nipples 3 times daily and cover with nonstick dressing. Shower in 48 hours with soap and water then daily.     Other restrictions (specify):   Order Comments: No lifting, pushing or pulling> 5 pounds x 2 weeks. No sweating x 2 weeks         Clinical Reference Documents Added to Patient Instructions       Document    BREAST REDUCTION DISCHARGE INSTRUCTIONS (ENGLISH)    GENERAL ANESTHESIA DISCHARGE INSTRUCTIONS (ENGLISH)          TIME SPENT ON DISCHARGE: 30 minutes

## 2022-01-06 NOTE — ANESTHESIA PREPROCEDURE EVALUATION
01/06/2022  Rhea Gilman is a 26 y.o., female.      Patient Active Problem List   Diagnosis    Atrial fibrillation       History reviewed. No pertinent surgical history.     Tobacco Use:  The patient  reports that she quit smoking about 9 months ago. Her smoking use included cigarettes. She smoked 0.00 packs per day for 0.00 years. She has never used smokeless tobacco.     Results for orders placed or performed during the hospital encounter of 12/22/21   EKG 12-lead    Collection Time: 12/22/21  9:04 AM    Narrative    Test Reason : R07.9,    Vent. Rate : 119 BPM     Atrial Rate : 119 BPM     P-R Int : 116 ms          QRS Dur : 068 ms      QT Int : 314 ms       P-R-T Axes : 058 053 017 degrees     QTc Int : 441 ms    Sinus tachycardia  Otherwise normal ECG    Confirmed by Susanna MILLAN, Nikolai MURRELL ([id number]) on 12/22/2021 4:48:18 PM    Referred By: [email or web address]   SELF           Confirmed By:Nikolai Mullins MD             Lab Results   Component Value Date    WBC 5.56 01/05/2022    HGB 12.5 01/05/2022    HCT 39.0 01/05/2022    MCV 98 01/05/2022     01/05/2022     BMP  Lab Results   Component Value Date     01/05/2022    K 3.9 01/05/2022     01/05/2022    CO2 25 01/05/2022    BUN 8 01/05/2022    CREATININE 0.7 01/05/2022    CALCIUM 8.8 01/05/2022    ANIONGAP 8 01/05/2022    GLU 90 01/05/2022     06/09/2021     03/04/2021       No results found for this or any previous visit.            Anesthesia Evaluation    I have reviewed the Patient Summary Reports.    I have reviewed the Nursing Notes. I have reviewed the NPO Status.   I have reviewed the Medications.     Review of Systems  Anesthesia Hx:  No problems with previous Anesthesia Denies Hx of Anesthetic complications  Denies Family Hx of Anesthesia complications.   Denies Personal Hx of Anesthesia complications.   Social:  Former Smoker     Hematology/Oncology:  Hematology Normal   Oncology Normal     EENT/Dental:EENT/Dental Normal   Cardiovascular:   Dysrhythmias atrial fibrillation ECG has been reviewed.    Pulmonary:  Pulmonary Normal    Renal/:  Renal/ Normal     Hepatic/GI:  Hepatic/GI Normal    Musculoskeletal:  Musculoskeletal Normal    Neurological:  Neurology Normal    Endocrine:  Endocrine Normal    Psych:  Psychiatric Normal           Physical Exam  General:  Well nourished    Airway/Jaw/Neck:  Airway Findings: Mouth Opening: Normal Tongue: Normal  General Airway Assessment: Adult  Mallampati: II  TM Distance: Normal, at least 6 cm  Jaw/Neck Findings:  Neck ROM: Normal ROM      Dental:  Dental Findings:   Chest/Lungs:  Chest/Lungs Findings: Clear to auscultation, Normal Respiratory Rate     Heart/Vascular:  Heart Findings: Rate: Normal  Rhythm: Regular Rhythm  Sounds: Normal        Mental Status:  Mental Status Findings:  Alert and Oriented         Anesthesia Plan  Type of Anesthesia, risks & benefits discussed:  Anesthesia Type:  general    Patient's Preference:   Plan Factors:          Intra-op Monitoring Plan: standard ASA monitors  Intra-op Monitoring Plan Comments:   Post Op Pain Control Plan: multimodal analgesia  Post Op Pain Control Plan Comments:     Induction:   IV  Beta Blocker:  Patient is on a Beta-Blocker and has received one dose within the past 24 hours (No further documentation required).       Informed Consent: Patient understands risks and agrees with Anesthesia plan.  Questions answered. Anesthesia consent signed with patient.  ASA Score: 2     Day of Surgery Review of History & Physical:        Anesthesia Plan Notes: TRACY  1g Ofirmev  Decadron, zofran, pepcid        Ready For Surgery From Anesthesia Perspective.

## 2022-01-06 NOTE — DISCHARGE INSTRUCTIONS
Patient Education       Breast Reduction Discharge Instructions   About this topic   Breast reduction is surgery to make your breasts smaller. It is done to change the size or shape of your breasts. You may decide to have this surgery to ease the discomfort of having very large breasts. You may also have it to make your breast size proportionate with the rest of your body. Men may choose to have this surgery if their breasts are larger than normal. This may be due to hormonal changes, drugs, or a health problem.       What care is needed at home?   · Ask your doctor what you need to do when you go home. Make sure you ask questions if you do not understand what the doctor says. This way you will know what you need to do.  · Sleep with your head and chest elevated on 2 to 3 pillows to reduce swelling.  · Place an ice pack or a bag of frozen peas wrapped in a towel over the painful part. Never put ice right on the skin. Do not leave the ice on more than 10 to 15 minutes at a time.  · Cough and take deep breaths to help keep your lungs clear.  · Talk to your doctor about how to care for your cut site. Ask your doctor about:  ? When you should change your bandages  ? If you have a drain in place, how to care for it  ? When you may take a bath or shower  ? If you need to be careful with lifting things over 10 pounds (4.5 kg)  ? When you may go back to your normal activities like work or driving  ? When you may use creams to lessen the scars and improve the look of your breasts. Avoid using creams until you skin has healed. This will help to lower your risk of infection.  · Be sure to wash your hands before and after touching your wound or dressing.  · Ask your doctor about the best kind of bra for you to wear.  · Tops that button up the front are easier to put on and take off than those that slip over your head.  · Your bowel movements may take some time to get back to normal. Eat small meals high in fiber to avoid hard  stools. Drink 6 to 8 glasses of water each day.  · Try to walk each day. Start by walking a little more than you did the day before. Walking boosts blood flow and helps prevent lung, belly, and blood problems.  What follow-up care is needed?   · Your doctor may ask you to make visits to the office to check on your progress. Be sure to keep these visits.   · Any drains placed in your breasts will be taken out 2 to 4 days after surgery.  · If you have stitches or staples, you will need to have them taken out. Your doctor will often want to do this in 1 to 2 weeks.  What drugs may be needed?   The doctor may order drugs to:  · Help with pain and swelling  · Prevent infection  · Treat an upset stomach  Will physical activity be limited?   You will need to limit your activity for a while. Avoid lifting, sports, and sex until your doctor says it is ok to do these things. Talk with your doctor about the right amount of activity for you.  What problems could happen?   · Bleeding  · Infection  · Scarring  · Breast sensation may not be the same  · May not be able to breastfeed  · Uneven breasts  · May limit movements of your shoulder and arm   When do I need to call the doctor?   · Signs of infection. These include a fever of 100.4°F (38°C) or higher, chills, wound that will not heal.  · Signs of wound infection. These include swelling, redness, warmth around the wound; too much pain when touched; yellowish, greenish, or bloody discharge; foul smell coming from the cut site; cut site opens up.  · Drain seems clogged and there is fluid under your surgery cuts  · Cough, shortness of breath, chest pain  · Too much pain, upset stomach, or throwing up that is not helped by pain or nausea drugs  · Redness, warmth, swelling, stiffness, or hardness in the arm or hand  Teach Back: Helping You Understand   The Teach Back Method helps you understand the information we are giving you. After you talk with the staff, tell them in your own  words what you learned. This helps to make sure the staff has described each thing clearly. It also helps to explain things that may have been confusing. Before going home, make sure you can do these:  · I can tell you about my procedure.  · I can tell you how to care for my cut site.  · I can tell you how to care for my drain.  · I can tell you what may help ease my pain.  · I can tell you what I will do if I have a fever or swelling, redness, or warmth around my wound.  Where can I learn more?   [facility] Choices  http://www.nhs.uk/conditions/breast-reduction/Pages/Introduction.aspx   [facility] Choices  https://www.nhs.uk/conditions/cosmetic-procedures/breast-reduction-female/   Last Reviewed Date   2020-07-02  Consumer Information Use and Disclaimer   This information is not specific medical advice and does not replace information you receive from your health care provider. This is only a brief summary of general information. It does NOT include all information about conditions, illnesses, injuries, tests, procedures, treatments, therapies, discharge instructions or life-style choices that may apply to you. You must talk with your health care provider for complete information about your health and treatment options. This information should not be used to decide whether or not to accept your health care providers advice, instructions or recommendations. Only your health care provider has the knowledge and training to provide advice that is right for you.  Copyright   Copyright © 2021 [employer] and its affiliates and/or licensors. All rights reserved.  Patient Education       General Anesthesia Discharge Instructions   About this topic   You may need general anesthesia if you need to be asleep during a procedure. Your doctor will use drugs to block the signals that go from your nerves to your brain. Doctors give general anesthesia during a surgery or procedure to:  · Allow you to sleep  · Help your body be still  · Relax  your muscles  · Help you to relax and be pain free  · Keep you from remembering the surgery  · Let the doctor manage your airway, breathing, and blood flow  The doctor or nurse anesthetist gives general anesthesia by a shot into your vein. Sometimes, you may breathe in a gas through a mask placed over your face.  What care is needed at home?   · Ask your doctor what you need to do when you go home. Make sure you ask questions if you do not understand what the doctor says.  · Your doctor may give you drugs to prevent or treat an upset stomach from the anesthetic. Take them as ordered.  · If your throat is sore, suck on ice chips or popsicles to ease throat pain.  · Put 2 to 3 pillows under your head and back when you lie down to help you breathe easier.  · For the first 24 to 48 hours:  ? Do not operate heavy or dangerous machinery.  ? Do not make major decisions or sign important papers. You may not be able to think clearly.  ? Avoid beer, wine, or mixed drinks.  · You are at a higher risk of falling for at least 24 hours after general anesthesia.  ? Take extra care when you get up.  ? Do not change positions quickly.  ? Do not rush when you need to go to the bathroom or to answer the phone.  ? Ask for help if you feel unsteady when you try to walk.  ? Wear shoes with non-slip soles and low heels.  What follow-up care is needed?   · Your doctor may ask you to come back to the office to check on your progress. Be sure to keep these visits.  · If you have stitches that do not dissolve or staples, you will need to have them removed. Your doctor will want to do this in 1 to 2 weeks. If the doctor used skin glue, the glue will fall off on its own.  What drugs may be needed?   The doctor may order drugs to:  · Help with pain  · Treat an upset stomach or throwing up  Will physical activity be limited?   · You will not be allowed to drive right away after the procedure. Ask a family member or a friend to drive you  home.  · Avoid trying to get out of bed without help until you are sure of your balance.  · You may have to limit your activity. Talk to your doctor about if you need to limit how much you lift or limit exercise after your procedure.  What changes to diet are needed?   Start with a light diet when you are fully awake. This includes things that are easy to swallow like soups, pudding, jello, toast, and eggs. Slowly progress to your normal diet.  What problems could happen?   · Low blood pressure  · Breathing problems  · Upset stomach or throwing up  · Dizziness  · Blood clots  · Infection  When do I need to call the doctor?   · Trouble breathing  · Upset stomach or throwing up more than 3 times in the next 2 days  · Dizziness  Teach Back: Helping You Understand   The Teach Back Method helps you understand the information we are giving you. After you talk with the staff, tell them in your own words what you learned. This helps to make sure the staff has described each thing clearly. It also helps to explain things that may have been confusing. Before going home, make sure you can do these:  · I can tell you about my procedure.  · I can tell you if I need to follow up with my doctor.  · I can tell you what is good for me to eat and drink the next day.  · I can tell you what I would do if I have trouble breathing, an upset stomach, or dizziness.  Where can I learn more?   [employer] [place] [employer]  https://www.nigms.nih.gov/education/pages/factsheet_Anesthesia.aspx   [facility] Choices  http://www.nhs.uk/conditions/Anaesthetic-general/Pages/Definition.aspx   Last Reviewed Date   2020-04-22  Consumer Information Use and Disclaimer   This information is not specific medical advice and does not replace information you receive from your health care provider. This is only a brief summary of general information. It does NOT include all information about conditions, illnesses, injuries, tests, procedures,  treatments, therapies, discharge instructions or life-style choices that may apply to you. You must talk with your health care provider for complete information about your health and treatment options. This information should not be used to decide whether or not to accept your health care providers advice, instructions or recommendations. Only your health care provider has the knowledge and training to provide advice that is right for you.  Copyright   Copyright © 2021 [employer] and its affiliates and/or licensors. All rights reserved.

## 2022-01-06 NOTE — BRIEF OP NOTE
[facility]  Brief Operative Note    Surgery Date: 1/6/2022     Surgeon(s) and Role:     * Peterson Schumacher MD - Primary    Assisting Surgeon: None    Pre-op Diagnosis:  Macromastia [N62]    Post-op Diagnosis:  Post-Op Diagnosis Codes:     * Macromastia [N62]    Procedure(s) (LRB):  Wan mammoplasty reduction (Bilateral)    Anesthesia: General    Operative Findings: Planned bilateral breast reduction, procedure as planned. No complications. Please see op note for procedure in detail    Estimated Blood Loss: * No values recorded between 1/6/2022  1:25 PM and 1/6/2022  3:05 PM *         Specimens:   Specimen (24h ago, onward)             Start     Ordered    01/06/22 1437  Specimen to Pathology - Surgery  Once        Comments: Pre-op Diagnosis: Macromastia [N62]Procedure(s):Wan mammoplasty reduction Number of specimens: 2Name of specimens: 1. LEFT BREAST TISSUE2. RIGHT BREAST TISSUE     Question:  Release to patient  Answer:  Immediate    01/06/22 1437                  Discharge Note    OUTCOME: Patient tolerated treatment/procedure well without complication and is now ready for discharge.    DISPOSITION: Home or Self Care    FINAL DIAGNOSIS:  Macromastia    FOLLOWUP: In clinic    DISCHARGE INSTRUCTIONS:    Discharge Procedure Orders   Diet general     Call MD for:  hives     Call MD for:  difficulty breathing, headache or visual disturbances     Call MD for:  persistent nausea and vomiting     Call MD for:  severe uncontrolled pain     Call MD for:  redness, tenderness, or signs of infection (pain, swelling, redness, odor or green/yellow discharge around incision site)     Call MD for:  persistent dizziness or light-headedness     Wound care routine (specify)   Order Comments: Wound care routine: Apply Bacitracin to nipples 3 times daily and cover with nonstick dressing. Shower in 48 hours with soap and water then daily.     Other restrictions (specify):   Order Comments: No lifting, pushing or  pulling> 5 pounds x 2 weeks. No sweating x 2 weeks        Clinical Reference Documents Added to Patient Instructions       Document    BREAST REDUCTION DISCHARGE INSTRUCTIONS (ENGLISH)    GENERAL ANESTHESIA DISCHARGE INSTRUCTIONS (ENGLISH)

## 2022-01-06 NOTE — ANESTHESIA POSTPROCEDURE EVALUATION
Anesthesia Post Evaluation    Patient: Rhea Gilman    Procedure(s) Performed: Procedure(s) (LRB):  Wan mammoplasty reduction (Bilateral)    Final Anesthesia Type: general      Patient location during evaluation: PACU  Patient participation: Yes- Able to Participate  Level of consciousness: awake and alert  Post-procedure vital signs: reviewed and stable  Pain management: adequate  Airway patency: patent  AFSANEH mitigation strategies: Multimodal analgesia, Extubation while patient is awake and Extubation and recovery carried out in lateral, semiupright, or other nonsupine position  PONV status at discharge: No PONV  Anesthetic complications: no      Cardiovascular status: blood pressure returned to baseline  Respiratory status: unassisted  Hydration status: euvolemic  Follow-up not needed.          Vitals Value Taken Time   /64 01/06/22 1715   Temp 36.3 °C (97.4 °F) 01/06/22 1700   Pulse 79 01/06/22 1724   Resp 10 01/06/22 1724   SpO2 98 % 01/06/22 1724   Vitals shown include unvalidated device data.      No case tracking events are documented in the log.      Pain/Lizette Score: Pain Rating Prior to Med Admin: 4 (1/6/2022  5:01 PM)  Lizette Score: 10 (1/6/2022  5:00 PM)

## 2022-01-06 NOTE — BRIEF OP NOTE
Certification of Assistant at Surgery       Surgery Date: 1/6/2022     Participating Surgeons:  Surgeon(s) and Role:     * Peterson Schumacher MD - Primary    Procedures:  Procedure(s) (LRB):  Wan mammoplasty reduction (Bilateral)    Assistant Surgeon's Certification of Necessity:  I understand that section 1842 (b) (6) (d) of the Social Security Act generally prohibits Medicare Part B reasonable charge payment for the services of assistants at surgery in teaching hospitals when qualified residents are available to furnish such services. I certify that the services for which payment is claimed were medically necessary, and that no qualified resident was available to perform the services. I further understand that these services are subject to post-payment review by the Medicare carrier.      LAURA Umana    01/06/2022  3:00 PM

## 2022-01-06 NOTE — TRANSFER OF CARE
"Anesthesia Transfer of Care Note    Patient: Rhea Gilman    Procedure(s) Performed: Procedure(s) (LRB):  Wan mammoplasty reduction (Bilateral)    Patient location: PACU    Anesthesia Type: general    Transport from OR: Transported from OR on room air with adequate spontaneous ventilation    Post pain: adequate analgesia    Post assessment: no apparent anesthetic complications    Post vital signs: stable    Level of consciousness: awake and alert    Nausea/Vomiting: no nausea/vomiting    Complications: none    Transfer of care protocol was followed      Last vitals:   Visit Vitals  /67 (BP Location: Right arm)   Pulse 84   Temp 36.6 °C (97.8 °F) (Oral)   Resp 17   Ht 5' 4" (1.626 m)   Wt 71.7 kg (158 lb)   LMP 12/18/2021   SpO2 100%   Breastfeeding No   BMI 27.12 kg/m²     "

## 2022-01-07 NOTE — OP NOTE
Date of Surgery: 1/6  Pre op Diagnosis:  1 macromastia  2 chronic neck pain  3 chronic back pain  4 skin rash  Post op Diagnosis:  same  Procedure performed: bilateral breast reduction  Surgeon: Dr Peterson Schumacher  First Assistant: Rosibel Ann PA-C (I certify that there were no qualified residents available to assist with this procedure)  Anesthesia: general  Complications none  Blood loss 150cc      The patient was evaluated in the preoperative holding area. Markings for the inferior pedicle bilateral breast reduction were made. The risks and possible complications including but not limited to; infection, bleeding, scarring, loss of sensation to the nipple, partial or total loss of the nipple, breast asymmetry, breast deformity, open wounds and need for further surgery were all explained to the patient. The patient signed the informed consent.     The patient was taken to the operating room and placed in the supine position. After adequate general endotracheal anesthesia, the patient was prepped and draped in the normal sterile fashion. The new nipple areola was marked measuring 42mm. The inferior pedicle was marked measuring 11cm at its base. The inferior pedicle was de-epithelialized. Good punctate bleeding was noted. The supra- medial and supra-lateral flaps were elevated to the clavicle superiorly, the lateral border of the sternum medially and the mid-axillary line laterally. The medial and lateral skin wedges were excise. The inferior pedicle was debulked. A similar procedure was performed on the opposite side. The incision's were closed using interrupted 3.0 monocryl followed by a running 4.0 monocryl subcuticular suture for the infra-mammary incision. The vertical limb and nipple areolar complex were closed using interrupted 4.0 monocryl followed by a running 4.0 monocryl subcuticular suture. A similar closure was performed on the opposite side

## 2022-01-07 NOTE — PLAN OF CARE
Pt tolerating oral liquids and voiding without difficulty, vital signs stable, SIDDHARTH drain instructions provided, pt ready for discharge

## 2022-01-12 ENCOUNTER — OFFICE VISIT (OUTPATIENT)
Dept: PLASTIC SURGERY | Facility: CLINIC | Age: 27
End: 2022-01-12
Payer: COMMERCIAL

## 2022-01-12 VITALS
HEIGHT: 64 IN | SYSTOLIC BLOOD PRESSURE: 112 MMHG | HEART RATE: 80 BPM | WEIGHT: 158 LBS | DIASTOLIC BLOOD PRESSURE: 66 MMHG | BODY MASS INDEX: 26.98 KG/M2

## 2022-01-12 DIAGNOSIS — Z09 SURGERY FOLLOW-UP EXAMINATION: Primary | ICD-10-CM

## 2022-01-12 PROCEDURE — 3008F PR BODY MASS INDEX (BMI) DOCUMENTED: ICD-10-PCS | Mod: CPTII,S$GLB,, | Performed by: PHYSICIAN ASSISTANT

## 2022-01-12 PROCEDURE — 1160F RVW MEDS BY RX/DR IN RCRD: CPT | Mod: CPTII,S$GLB,, | Performed by: PHYSICIAN ASSISTANT

## 2022-01-12 PROCEDURE — 99999 PR PBB SHADOW E&M-EST. PATIENT-LVL III: ICD-10-PCS | Mod: PBBFAC,,, | Performed by: PHYSICIAN ASSISTANT

## 2022-01-12 PROCEDURE — 1159F PR MEDICATION LIST DOCUMENTED IN MEDICAL RECORD: ICD-10-PCS | Mod: CPTII,S$GLB,, | Performed by: PHYSICIAN ASSISTANT

## 2022-01-12 PROCEDURE — 3078F PR MOST RECENT DIASTOLIC BLOOD PRESSURE < 80 MM HG: ICD-10-PCS | Mod: CPTII,S$GLB,, | Performed by: PHYSICIAN ASSISTANT

## 2022-01-12 PROCEDURE — 99024 POSTOP FOLLOW-UP VISIT: CPT | Mod: S$GLB,,, | Performed by: PHYSICIAN ASSISTANT

## 2022-01-12 PROCEDURE — 3074F SYST BP LT 130 MM HG: CPT | Mod: CPTII,S$GLB,, | Performed by: PHYSICIAN ASSISTANT

## 2022-01-12 PROCEDURE — 1160F PR REVIEW ALL MEDS BY PRESCRIBER/CLIN PHARMACIST DOCUMENTED: ICD-10-PCS | Mod: CPTII,S$GLB,, | Performed by: PHYSICIAN ASSISTANT

## 2022-01-12 PROCEDURE — 3008F BODY MASS INDEX DOCD: CPT | Mod: CPTII,S$GLB,, | Performed by: PHYSICIAN ASSISTANT

## 2022-01-12 PROCEDURE — 99024 PR POST-OP FOLLOW-UP VISIT: ICD-10-PCS | Mod: S$GLB,,, | Performed by: PHYSICIAN ASSISTANT

## 2022-01-12 PROCEDURE — 1159F MED LIST DOCD IN RCRD: CPT | Mod: CPTII,S$GLB,, | Performed by: PHYSICIAN ASSISTANT

## 2022-01-12 PROCEDURE — 3074F PR MOST RECENT SYSTOLIC BLOOD PRESSURE < 130 MM HG: ICD-10-PCS | Mod: CPTII,S$GLB,, | Performed by: PHYSICIAN ASSISTANT

## 2022-01-12 PROCEDURE — 3078F DIAST BP <80 MM HG: CPT | Mod: CPTII,S$GLB,, | Performed by: PHYSICIAN ASSISTANT

## 2022-01-12 PROCEDURE — 99999 PR PBB SHADOW E&M-EST. PATIENT-LVL III: CPT | Mod: PBBFAC,,, | Performed by: PHYSICIAN ASSISTANT

## 2022-01-12 NOTE — PROGRESS NOTES
Rhea Gilman presents to [facility] on 1/12/2022 for a follow up visit status post bilateral breast reduction on 01/06/2022 by Dr. Peterson Schumacher. She is doing well today with no issues since her surgery. She denies fever, chills, nausea, vomiting or other systemic signs of infection.     Review of patient's allergies indicates:   Allergen Reactions    Amoxicillin Hives       Current Outpatient Medications on File Prior to Visit   Medication Sig Dispense Refill    famotidine (PEPCID) 20 MG tablet Take 1 tablet (20 mg total) by mouth every 12 (twelve) hours. Take with ibuprofen. 60 tablet 0    fluticasone (FLONASE ALLERGY RELIEF) 50 mcg/actuation nasal spray 1 spray (50 mcg total) by Each Nare route once daily. 15.8 mL 1    ondansetron (ZOFRAN) 4 MG tablet Take 1 tablet (4 mg total) by mouth every 8 (eight) hours as needed for Nausea. 20 tablet 0    oxyCODONE-acetaminophen (PERCOCET) 5-325 mg per tablet Take 1 tablet by mouth every 4 (four) hours as needed for Pain. 31 tablet 0    propranoloL (INDERAL) 10 MG tablet Take 2 tablets (20 mg total) by mouth 3 (three) times daily. (Patient taking differently: Take 500 mg by mouth 3 (three) times daily.) 270 tablet 0    semaglutide (OZEMPIC) 1 mg/dose (4 mg/3 mL) Inject 1 mg into the skin every 7 days. 3 pen 1    sulfamethoxazole-trimethoprim 800-160mg (BACTRIM DS) 800-160 mg Tab Take 1 tablet by mouth 2 (two) times daily. for 10 days 20 tablet 0    omeprazole (PRILOSEC) 20 MG capsule Take 2 capsules (40 mg total) by mouth once daily. 60 capsule 0     Current Facility-Administered Medications on File Prior to Visit   Medication Dose Route Frequency Provider Last Rate Last Admin    LIDOcaine (PF) 10 mg/ml (1%) injection 10 mg  1 mL Intradermal Once Peterson Schumacher MD        mupirocin 2 % ointment   Nasal On Call Procedure Peterson Schumacher MD           Patient Active Problem List   Diagnosis    Atrial fibrillation     Past Surgical  History:   Procedure Laterality Date    AUGMENTATION OF BREAST Bilateral 2022    Procedure: MAMMOPLASTY, AUGMENTATION;  Surgeon: Peterson Schumacher MD;  Location: [facility];  Service: Plastics;  Laterality: Bilateral;         Social History     Socioeconomic History    Marital status: Single   Tobacco Use    Smoking status: Former Smoker     Packs/day: 0.00     Years: 0.00     Pack years: 0.00     Types: Cigarettes     Quit date: 3/18/2021     Years since quittin.8    Smokeless tobacco: Never Used    Tobacco comment: 1 pack 3 days   Substance and Sexual Activity    Alcohol use: No    Drug use: No    Sexual activity: Yes     Partners: Male     Birth control/protection: None     1. BREAST, RIGHT, REDUCTION MAMMOPLASTY   - BREAST PARENCHYMA AND SKIN WITH NO SIGNIFICANT HISTOPATHOLOGIC    FINDINGS     2. BREAST, LEFT, REDUCTION MAMMOPLASTY   - BREAST PARENCHYMA AND SKIN WITH NO SIGNIFICANT HISTOPATHOLOGIC    FINDINGS     ROS - negative, other than stated above    PHYSICAL EXAMINATION  Vitals:    22 1408   BP: 112/66   Pulse: 80     WD WN NAD  VSS  Normal respiratory effort  R breast - incision CDI, no erythema or drainage, nipple viable + sensation  L breast - incision CDI, no erythema or drainage, nipple viable + sensation    ASSESSMENT/PLAN  26 y.o. F s/p bilateral breast reduction   - Patient seen and evaluated by myself and Dr. Peterson Schumacher    - Doing well, no issues. Patient is pleased with the results of her surgery  - Benign pathology discussed  - Bilateral breast drains with <25cc/24 hours x 3 consecutive days. Drain removed.   - Continue with Bacitracin to B nipple TID x 7 additional days then can stop  - RTC x 2 week(s), appointment scheduled.      All questions were answered. The patient was advised to call the clinic with any questions or concerns prior to their next visit.         Rosibel Ann PA-C  Certified Physician Assistant  Department of Plastic and Reconstructive  Surgery  [phone] (office)

## 2022-02-09 ENCOUNTER — OFFICE VISIT (OUTPATIENT)
Dept: PLASTIC SURGERY | Facility: CLINIC | Age: 27
End: 2022-02-09
Payer: COMMERCIAL

## 2022-02-09 VITALS
HEART RATE: 88 BPM | DIASTOLIC BLOOD PRESSURE: 68 MMHG | SYSTOLIC BLOOD PRESSURE: 120 MMHG | BODY MASS INDEX: 26.46 KG/M2 | HEIGHT: 64 IN | WEIGHT: 155 LBS

## 2022-02-09 DIAGNOSIS — Z09 SURGERY FOLLOW-UP EXAMINATION: Primary | ICD-10-CM

## 2022-02-09 PROCEDURE — 99024 POSTOP FOLLOW-UP VISIT: CPT | Mod: S$GLB,,, | Performed by: PHYSICIAN ASSISTANT

## 2022-02-09 PROCEDURE — 1160F RVW MEDS BY RX/DR IN RCRD: CPT | Mod: CPTII,S$GLB,, | Performed by: PHYSICIAN ASSISTANT

## 2022-02-09 PROCEDURE — 1159F MED LIST DOCD IN RCRD: CPT | Mod: CPTII,S$GLB,, | Performed by: PHYSICIAN ASSISTANT

## 2022-02-09 PROCEDURE — 1160F PR REVIEW ALL MEDS BY PRESCRIBER/CLIN PHARMACIST DOCUMENTED: ICD-10-PCS | Mod: CPTII,S$GLB,, | Performed by: PHYSICIAN ASSISTANT

## 2022-02-09 PROCEDURE — 99999 PR PBB SHADOW E&M-EST. PATIENT-LVL III: CPT | Mod: PBBFAC,,, | Performed by: PHYSICIAN ASSISTANT

## 2022-02-09 PROCEDURE — 3078F DIAST BP <80 MM HG: CPT | Mod: CPTII,S$GLB,, | Performed by: PHYSICIAN ASSISTANT

## 2022-02-09 PROCEDURE — 3008F PR BODY MASS INDEX (BMI) DOCUMENTED: ICD-10-PCS | Mod: CPTII,S$GLB,, | Performed by: PHYSICIAN ASSISTANT

## 2022-02-09 PROCEDURE — 3074F SYST BP LT 130 MM HG: CPT | Mod: CPTII,S$GLB,, | Performed by: PHYSICIAN ASSISTANT

## 2022-02-09 PROCEDURE — 3008F BODY MASS INDEX DOCD: CPT | Mod: CPTII,S$GLB,, | Performed by: PHYSICIAN ASSISTANT

## 2022-02-09 PROCEDURE — 3074F PR MOST RECENT SYSTOLIC BLOOD PRESSURE < 130 MM HG: ICD-10-PCS | Mod: CPTII,S$GLB,, | Performed by: PHYSICIAN ASSISTANT

## 2022-02-09 PROCEDURE — 99024 PR POST-OP FOLLOW-UP VISIT: ICD-10-PCS | Mod: S$GLB,,, | Performed by: PHYSICIAN ASSISTANT

## 2022-02-09 PROCEDURE — 3078F PR MOST RECENT DIASTOLIC BLOOD PRESSURE < 80 MM HG: ICD-10-PCS | Mod: CPTII,S$GLB,, | Performed by: PHYSICIAN ASSISTANT

## 2022-02-09 PROCEDURE — 1159F PR MEDICATION LIST DOCUMENTED IN MEDICAL RECORD: ICD-10-PCS | Mod: CPTII,S$GLB,, | Performed by: PHYSICIAN ASSISTANT

## 2022-02-09 PROCEDURE — 99999 PR PBB SHADOW E&M-EST. PATIENT-LVL III: ICD-10-PCS | Mod: PBBFAC,,, | Performed by: PHYSICIAN ASSISTANT

## 2022-02-09 NOTE — PROGRESS NOTES
Rhea Gilman presents to [facility] on 2/9/2022 for a follow up visit status post bilateral breast reduction on 01/06/2022 by Dr. Peterson Schumacher. She is doing well today with no issues since her surgery.She is here today for routine follow up.    She denies fever, chills, nausea, vomiting or other systemic signs of infection.     Review of patient's allergies indicates:   Allergen Reactions    Amoxicillin Hives       Current Outpatient Medications on File Prior to Visit   Medication Sig Dispense Refill    famotidine (PEPCID) 20 MG tablet Take 1 tablet (20 mg total) by mouth every 12 (twelve) hours. Take with ibuprofen. 60 tablet 0    fluticasone (FLONASE ALLERGY RELIEF) 50 mcg/actuation nasal spray 1 spray (50 mcg total) by Each Nare route once daily. 15.8 mL 1    omeprazole (PRILOSEC) 20 MG capsule Take 2 capsules (40 mg total) by mouth once daily. 60 capsule 0    ondansetron (ZOFRAN) 4 MG tablet Take 1 tablet (4 mg total) by mouth every 8 (eight) hours as needed for Nausea. 20 tablet 0    oxyCODONE-acetaminophen (PERCOCET) 5-325 mg per tablet Take 1 tablet by mouth every 4 (four) hours as needed for Pain. 31 tablet 0    propranoloL (INDERAL) 10 MG tablet Take 2 tablets (20 mg total) by mouth 3 (three) times daily. (Patient taking differently: Take 500 mg by mouth 3 (three) times daily.) 270 tablet 0    semaglutide (OZEMPIC) 1 mg/dose (4 mg/3 mL) Inject 1 mg into the skin every 7 days. 3 pen 1     Current Facility-Administered Medications on File Prior to Visit   Medication Dose Route Frequency Provider Last Rate Last Admin    LIDOcaine (PF) 10 mg/ml (1%) injection 10 mg  1 mL Intradermal Once Peterson Schumacher MD        mupirocin 2 % ointment   Nasal On Call Procedure Peterson Schumacher MD           Patient Active Problem List   Diagnosis    Atrial fibrillation     Past Surgical History:   Procedure Laterality Date    AUGMENTATION OF BREAST Bilateral 1/6/2022    Procedure:  MAMMOPLASTY, AUGMENTATION;  Surgeon: Peterson Schumacher MD;  Location: [facility];  Service: Plastics;  Laterality: Bilateral;         Social History     Socioeconomic History    Marital status: Single   Tobacco Use    Smoking status: Former Smoker     Packs/day: 0.00     Years: 0.00     Pack years: 0.00     Types: Cigarettes     Quit date: 3/18/2021     Years since quittin.8    Smokeless tobacco: Never Used    Tobacco comment: 1 pack 3 days   Substance and Sexual Activity    Alcohol use: No    Drug use: No    Sexual activity: Yes     Partners: Male     Birth control/protection: None     1. BREAST, RIGHT, REDUCTION MAMMOPLASTY   - BREAST PARENCHYMA AND SKIN WITH NO SIGNIFICANT HISTOPATHOLOGIC    FINDINGS     2. BREAST, LEFT, REDUCTION MAMMOPLASTY   - BREAST PARENCHYMA AND SKIN WITH NO SIGNIFICANT HISTOPATHOLOGIC    FINDINGS     ROS - negative, other than stated above    PHYSICAL EXAMINATION  Vitals:    22 1133   BP: 120/68   Pulse: 88     WD WN NAD  VSS  Normal respiratory effort  R breast - incision CDI, no erythema or drainage, nipple viable + sensation  L breast - incision CDI, no erythema or drainage, nipple viable + sensation    ASSESSMENT/PLAN  26 y.o. F s/p bilateral breast reduction   - Patient doing well and with no complaints or concerns.   - Breast are soft and symmetric. She Is very pleased with the results of her surgery.   - RTC x 3 months, appointment scheduled.      All questions were answered. The patient was advised to call the clinic with any questions or concerns prior to their next visit.         Rosibel Ann PA-C  Certified Physician Assistant  Department of Plastic and Reconstructive Surgery  [phone] (office)

## 2022-02-14 DIAGNOSIS — G43.009 MIGRAINE WITHOUT AURA AND WITHOUT STATUS MIGRAINOSUS, NOT INTRACTABLE: ICD-10-CM

## 2022-02-14 RX ORDER — PROPRANOLOL HYDROCHLORIDE 10 MG/1
20 TABLET ORAL 3 TIMES DAILY
Qty: 180 TABLET | Refills: 2 | Status: SHIPPED | OUTPATIENT
Start: 2022-02-14 | End: 2023-01-27

## 2022-03-17 ENCOUNTER — OFFICE VISIT (OUTPATIENT)
Dept: BARIATRICS | Facility: CLINIC | Age: 27
End: 2022-03-17
Payer: COMMERCIAL

## 2022-03-17 VITALS
SYSTOLIC BLOOD PRESSURE: 110 MMHG | BODY MASS INDEX: 26.67 KG/M2 | HEART RATE: 70 BPM | WEIGHT: 155.38 LBS | OXYGEN SATURATION: 98 % | DIASTOLIC BLOOD PRESSURE: 63 MMHG

## 2022-03-17 DIAGNOSIS — E88.819 INSULIN RESISTANCE: ICD-10-CM

## 2022-03-17 DIAGNOSIS — E66.3 OVERWEIGHT (BMI 25.0-29.9): Primary | ICD-10-CM

## 2022-03-17 PROCEDURE — 1160F PR REVIEW ALL MEDS BY PRESCRIBER/CLIN PHARMACIST DOCUMENTED: ICD-10-PCS | Mod: CPTII,S$GLB,, | Performed by: INTERNAL MEDICINE

## 2022-03-17 PROCEDURE — 1160F RVW MEDS BY RX/DR IN RCRD: CPT | Mod: CPTII,S$GLB,, | Performed by: INTERNAL MEDICINE

## 2022-03-17 PROCEDURE — 1159F MED LIST DOCD IN RCRD: CPT | Mod: CPTII,S$GLB,, | Performed by: INTERNAL MEDICINE

## 2022-03-17 PROCEDURE — 3074F SYST BP LT 130 MM HG: CPT | Mod: CPTII,S$GLB,, | Performed by: INTERNAL MEDICINE

## 2022-03-17 PROCEDURE — 1159F PR MEDICATION LIST DOCUMENTED IN MEDICAL RECORD: ICD-10-PCS | Mod: CPTII,S$GLB,, | Performed by: INTERNAL MEDICINE

## 2022-03-17 PROCEDURE — 3078F PR MOST RECENT DIASTOLIC BLOOD PRESSURE < 80 MM HG: ICD-10-PCS | Mod: CPTII,S$GLB,, | Performed by: INTERNAL MEDICINE

## 2022-03-17 PROCEDURE — 99213 OFFICE O/P EST LOW 20 MIN: CPT | Mod: S$GLB,,, | Performed by: INTERNAL MEDICINE

## 2022-03-17 PROCEDURE — 99999 PR PBB SHADOW E&M-EST. PATIENT-LVL IV: CPT | Mod: PBBFAC,,, | Performed by: INTERNAL MEDICINE

## 2022-03-17 PROCEDURE — 3008F PR BODY MASS INDEX (BMI) DOCUMENTED: ICD-10-PCS | Mod: CPTII,S$GLB,, | Performed by: INTERNAL MEDICINE

## 2022-03-17 PROCEDURE — 3078F DIAST BP <80 MM HG: CPT | Mod: CPTII,S$GLB,, | Performed by: INTERNAL MEDICINE

## 2022-03-17 PROCEDURE — 3074F PR MOST RECENT SYSTOLIC BLOOD PRESSURE < 130 MM HG: ICD-10-PCS | Mod: CPTII,S$GLB,, | Performed by: INTERNAL MEDICINE

## 2022-03-17 PROCEDURE — 3008F BODY MASS INDEX DOCD: CPT | Mod: CPTII,S$GLB,, | Performed by: INTERNAL MEDICINE

## 2022-03-17 PROCEDURE — 99999 PR PBB SHADOW E&M-EST. PATIENT-LVL IV: ICD-10-PCS | Mod: PBBFAC,,, | Performed by: INTERNAL MEDICINE

## 2022-03-17 PROCEDURE — 99213 PR OFFICE/OUTPT VISIT, EST, LEVL III, 20-29 MIN: ICD-10-PCS | Mod: S$GLB,,, | Performed by: INTERNAL MEDICINE

## 2022-03-17 RX ORDER — DIETHYLPROPION HYDROCHLORIDE 75 MG/1
75 TABLET, EXTENDED RELEASE ORAL DAILY
Qty: 30 TABLET | Refills: 2 | Status: SHIPPED | OUTPATIENT
Start: 2022-03-17 | End: 2022-09-09 | Stop reason: SDUPTHER

## 2022-03-17 RX ORDER — SEMAGLUTIDE 1.34 MG/ML
1 INJECTION, SOLUTION SUBCUTANEOUS
Qty: 3 PEN | Refills: 1 | Status: SHIPPED | OUTPATIENT
Start: 2022-03-17 | End: 2022-06-16 | Stop reason: SDUPTHER

## 2022-03-17 NOTE — PROGRESS NOTES
Subjective:       Patient ID: Rhea Gilman is a 26 y.o. female.    Chief Complaint: Follow-up    CC:Obesity,      Pt here today for follow-up. Has lost 17.6  lbs (-1.3 lbs muscle. -10.4 lbs fat). Has been on 1000 raymond diet and   Completed 6 months wegovy with good results, and then switched to Ozempic 1 mg weekly. Has had reduction mammoplasty.  Did have some insulin resistance. Had tried metformin.    HR good today.   She has been doing better with her eating habits. She does feel she her appetite. Denies SE, except for occasional nausea, or a little fatigue after the inj.  Has done some exercise with weights and medicine ball.   Will be getting mammoplasty next month.     New BMR: 1286  New PBF: 39.8      BMR: 1388 (inbody)    Review of Systems   Constitutional: Negative for chills and fever.   Respiratory: Negative for shortness of breath.         + snores.    Cardiovascular: Negative for chest pain, palpitations and leg swelling.   Gastrointestinal: Negative for constipation and diarrhea.        Denies GERD   Genitourinary:        Using condoms for contraception.      Neurological: Negative for dizziness and light-headedness.   Psychiatric/Behavioral: Negative for dysphoric mood. The patient is not nervous/anxious.        Objective:     /63   Pulse 70   Wt 70.5 kg (155 lb 6.4 oz)   SpO2 98%   BMI 26.67 kg/m²    Physical Exam   Constitutional: She is oriented to person, place, and time. She appears well-developed and well-nourished. No distress.   overweight   HENT:   Head: Normocephalic and atraumatic.   Mouth/Throat: No oropharyngeal exudate.   Eyes:  EOM are normal. No scleral icterus.   Neck: Normal range of motion.  Cardiovascular: Normal rate  Pulmonary/Chest: Effort normal. No respiratory distress  Musculoskeletal: Normal range of motion. She exhibits no edema.   Neurological: She is alert and oriented to person, place, and time. No cranial nerve deficit.   Skin: Skin is warm and dry. No erythema.    Psychiatric: She has a normal mood and affect. Her behavior is normal. Judgment normal.   Vitals reviewed.      Assessment:       1. Overweight (BMI 25.0-29.9)    2. Insulin resistance        Plan:         Rhea was seen today for follow-up.    Diagnoses and all orders for this visit:    Overweight (BMI 25.0-29.9)  -     diethylpropion (TENUATE) 75 mg SR tablet; Take 1 tablet (75 mg total) by mouth once daily.    Insulin resistance  -     semaglutide (OZEMPIC) 1 mg/dose (4 mg/3 mL); Inject 1 mg into the skin every 7 days.         Patient warned of common side effects of diethylpropion including anxiety, insomnia, palpitations and increased blood pressure. It was also explained that it is for short-term usage along with diet and exercise, and that stopping the medication without making lifestyle changes will result in regain of weight. Patient states understanding.    Weight loss medications are controlled substances.  They require routine follow up. Prescription or pills that are lost or destroyed will not be replaced.     Continue  Ozempic 1mg once a week.     Diethylpropion as needed    Decrease portions as soon as you start Ozempic. Avoid fried, greasy, fatty foods.     Some nausea in the first 2 weeks is not unusual.     If you get pain across the upper abdomen and around to your back, please call the office.       Www[employer] for coupon/videos.          1000 calories a day  40%  carbs(100 grams)  30% protein (75 grams)- 50 g minimum!  30 % fat (33 grams)  Food diary or calorie tracking Karlene      Exercise -Add some type of resistance training 2-3 days a week. These can be body weight exercises, light weight or elastic bands. [employer] and [employer] are great sources for free work out plans and videos.     .

## 2022-03-17 NOTE — PATIENT INSTRUCTIONS
Patient warned of common side effects of diethylpropion including anxiety, insomnia, palpitations and increased blood pressure. It was also explained that it is for short-term usage along with diet and exercise, and that stopping the medication without making lifestyle changes will result in regain of weight. Patient states understanding.    Weight loss medications are controlled substances.  They require routine follow up. Prescription or pills that are lost or destroyed will not be replaced.       Patient warned of common side effects of diethylpropion including anxiety, insomnia, palpitations and increased blood pressure. It was also explained that it is for short-term usage along with diet and exercise, and that stopping the medication without making lifestyle changes will result in regain of weight. Patient states understanding.    Weight loss medications are controlled substances.  They require routine follow up. Prescription or pills that are lost or destroyed will not be replaced.       Diethylpropion as needed     Continue  Ozempic 1mg once a week.       Decrease portions as soon as you start Ozempic. Avoid fried, greasy, fatty foods.     Some nausea in the first 2 weeks is not unusual.     If you get pain across the upper abdomen and around to your back, please call the office.       Www[employer] for coupon/videos.          1000 calories a day  40%  carbs(100 grams)  30% protein (75 grams)- 50 g minimum!  30 % fat (33 grams)  Food diary or calorie tracking Karlene      Exercise -Add some type of resistance training 2-3 days a week. These can be body weight exercises, light weight or elastic bands. [employer] and [employer] are great sources for free work out plans and videos.     .      Telemetry

## 2022-03-28 DIAGNOSIS — E66.3 OVERWEIGHT (BMI 25.0-29.9): Primary | ICD-10-CM

## 2022-03-30 ENCOUNTER — HOSPITAL ENCOUNTER (OUTPATIENT)
Dept: RADIOLOGY | Facility: HOSPITAL | Age: 27
Discharge: HOME | End: 2022-03-30
Attending: INTERNAL MEDICINE
Payer: COMMERCIAL

## 2022-03-30 DIAGNOSIS — E66.3 OVERWEIGHT (BMI 25.0-29.9): ICD-10-CM

## 2022-03-30 PROCEDURE — 76830 TRANSVAGINAL US NON-OB: CPT | Mod: 26,,, | Performed by: INTERNAL MEDICINE

## 2022-03-30 PROCEDURE — 76830 TRANSVAGINAL US NON-OB: CPT | Mod: TC

## 2022-03-30 PROCEDURE — 76856 US EXAM PELVIC COMPLETE: CPT | Mod: 26,,, | Performed by: INTERNAL MEDICINE

## 2022-03-30 PROCEDURE — 76856 US PELVIS COMP WITH TRANSVAG NON-OB (XPD): ICD-10-PCS | Mod: 26,,, | Performed by: INTERNAL MEDICINE

## 2022-03-30 PROCEDURE — 76830 US PELVIS COMP WITH TRANSVAG NON-OB (XPD): ICD-10-PCS | Mod: 26,,, | Performed by: INTERNAL MEDICINE

## 2022-03-31 ENCOUNTER — PATIENT MESSAGE (OUTPATIENT)
Dept: BARIATRICS | Facility: CLINIC | Age: 27
End: 2022-03-31
Payer: COMMERCIAL

## 2022-04-01 DIAGNOSIS — Z01.419 WELL WOMAN EXAM: Primary | ICD-10-CM

## 2022-04-05 ENCOUNTER — LAB VISIT (OUTPATIENT)
Dept: LAB | Facility: HOSPITAL | Age: 27
End: 2022-04-05
Attending: INTERNAL MEDICINE
Payer: COMMERCIAL

## 2022-04-05 DIAGNOSIS — Z01.419 WELL WOMAN EXAM: ICD-10-CM

## 2022-04-05 LAB
ALBUMIN SERPL BCP-MCNC: 3.8 G/DL (ref 3.5–5.2)
ALP SERPL-CCNC: 47 U/L (ref 55–135)
ALT SERPL W/O P-5'-P-CCNC: 8 U/L (ref 10–44)
ANION GAP SERPL CALC-SCNC: 8 MMOL/L (ref 8–16)
AST SERPL-CCNC: 13 U/L (ref 10–40)
BASOPHILS # BLD AUTO: 0.04 K/UL (ref 0–0.2)
BASOPHILS NFR BLD: 0.9 % (ref 0–1.9)
BILIRUB SERPL-MCNC: 0.7 MG/DL (ref 0.1–1)
BUN SERPL-MCNC: 10 MG/DL (ref 6–20)
CALCIUM SERPL-MCNC: 9.2 MG/DL (ref 8.7–10.5)
CHLORIDE SERPL-SCNC: 103 MMOL/L (ref 95–110)
CO2 SERPL-SCNC: 27 MMOL/L (ref 23–29)
CREAT SERPL-MCNC: 0.8 MG/DL (ref 0.5–1.4)
DIFFERENTIAL METHOD: NORMAL
EOSINOPHIL # BLD AUTO: 0.4 K/UL (ref 0–0.5)
EOSINOPHIL NFR BLD: 7.8 % (ref 0–8)
ERYTHROCYTE [DISTWIDTH] IN BLOOD BY AUTOMATED COUNT: 12.4 % (ref 11.5–14.5)
EST. GFR  (AFRICAN AMERICAN): >60 ML/MIN/1.73 M^2
EST. GFR  (NON AFRICAN AMERICAN): >60 ML/MIN/1.73 M^2
ESTIMATED AVG GLUCOSE: 91 MG/DL (ref 68–131)
GLUCOSE SERPL-MCNC: 83 MG/DL (ref 70–110)
HBA1C MFR BLD: 4.8 % (ref 4–5.6)
HBV SURFACE AG SERPL QL IA: NEGATIVE
HCT VFR BLD AUTO: 40.8 % (ref 37–48.5)
HCV AB SERPL QL IA: NEGATIVE
HGB BLD-MCNC: 13.3 G/DL (ref 12–16)
HIV 1+2 AB+HIV1 P24 AG SERPL QL IA: NEGATIVE
IMM GRANULOCYTES # BLD AUTO: 0.01 K/UL (ref 0–0.04)
IMM GRANULOCYTES NFR BLD AUTO: 0.2 % (ref 0–0.5)
LYMPHOCYTES # BLD AUTO: 1.3 K/UL (ref 1–4.8)
LYMPHOCYTES NFR BLD: 29.2 % (ref 18–48)
MCH RBC QN AUTO: 29.8 PG (ref 27–31)
MCHC RBC AUTO-ENTMCNC: 32.6 G/DL (ref 32–36)
MCV RBC AUTO: 91 FL (ref 82–98)
MONOCYTES # BLD AUTO: 0.3 K/UL (ref 0.3–1)
MONOCYTES NFR BLD: 6.9 % (ref 4–15)
NEUTROPHILS # BLD AUTO: 2.5 K/UL (ref 1.8–7.7)
NEUTROPHILS NFR BLD: 55 % (ref 38–73)
NRBC BLD-RTO: 0 /100 WBC
PLATELET # BLD AUTO: 253 K/UL (ref 150–450)
PMV BLD AUTO: 9.7 FL (ref 9.2–12.9)
POTASSIUM SERPL-SCNC: 4.3 MMOL/L (ref 3.5–5.1)
PROT SERPL-MCNC: 6.9 G/DL (ref 6–8.4)
RBC # BLD AUTO: 4.47 M/UL (ref 4–5.4)
SODIUM SERPL-SCNC: 138 MMOL/L (ref 136–145)
WBC # BLD AUTO: 4.49 K/UL (ref 3.9–12.7)

## 2022-04-05 PROCEDURE — 36415 COLL VENOUS BLD VENIPUNCTURE: CPT | Performed by: INTERNAL MEDICINE

## 2022-04-05 PROCEDURE — 86803 HEPATITIS C AB TEST: CPT | Performed by: INTERNAL MEDICINE

## 2022-04-05 PROCEDURE — 85025 COMPLETE CBC W/AUTO DIFF WBC: CPT | Performed by: INTERNAL MEDICINE

## 2022-04-05 PROCEDURE — 86696 HERPES SIMPLEX TYPE 2 TEST: CPT | Performed by: INTERNAL MEDICINE

## 2022-04-05 PROCEDURE — 87389 HIV-1 AG W/HIV-1&-2 AB AG IA: CPT | Performed by: INTERNAL MEDICINE

## 2022-04-05 PROCEDURE — 83036 HEMOGLOBIN GLYCOSYLATED A1C: CPT | Performed by: INTERNAL MEDICINE

## 2022-04-05 PROCEDURE — 87340 HEPATITIS B SURFACE AG IA: CPT | Performed by: INTERNAL MEDICINE

## 2022-04-05 PROCEDURE — 80053 COMPREHEN METABOLIC PANEL: CPT | Performed by: INTERNAL MEDICINE

## 2022-04-05 PROCEDURE — 86695 HERPES SIMPLEX TYPE 1 TEST: CPT | Performed by: INTERNAL MEDICINE

## 2022-04-07 LAB
HSV1 IGG SERPL QL IA: POSITIVE
HSV2 IGG SERPL QL IA: NEGATIVE

## 2022-04-26 ENCOUNTER — OFFICE VISIT (OUTPATIENT)
Dept: INTERNAL MEDICINE | Facility: CLINIC | Age: 27
End: 2022-04-26
Payer: COMMERCIAL

## 2022-04-26 DIAGNOSIS — Z01.419 WELL WOMAN EXAM: ICD-10-CM

## 2022-04-26 DIAGNOSIS — N83.209 CYST OF OVARY, UNSPECIFIED LATERALITY: ICD-10-CM

## 2022-04-26 DIAGNOSIS — I48.91 ATRIAL FIBRILLATION, UNSPECIFIED TYPE: Primary | ICD-10-CM

## 2022-04-26 PROCEDURE — 1159F MED LIST DOCD IN RCRD: CPT | Mod: CPTII,S$GLB,, | Performed by: INTERNAL MEDICINE

## 2022-04-26 PROCEDURE — 1159F PR MEDICATION LIST DOCUMENTED IN MEDICAL RECORD: ICD-10-PCS | Mod: CPTII,S$GLB,, | Performed by: INTERNAL MEDICINE

## 2022-04-26 PROCEDURE — 3044F HG A1C LEVEL LT 7.0%: CPT | Mod: CPTII,S$GLB,, | Performed by: INTERNAL MEDICINE

## 2022-04-26 PROCEDURE — 3008F BODY MASS INDEX DOCD: CPT | Mod: CPTII,S$GLB,, | Performed by: INTERNAL MEDICINE

## 2022-04-26 PROCEDURE — 99999 PR PBB SHADOW E&M-EST. PATIENT-LVL V: ICD-10-PCS | Mod: PBBFAC,,, | Performed by: INTERNAL MEDICINE

## 2022-04-26 PROCEDURE — 99213 OFFICE O/P EST LOW 20 MIN: CPT | Mod: S$GLB,,, | Performed by: INTERNAL MEDICINE

## 2022-04-26 PROCEDURE — 3074F PR MOST RECENT SYSTOLIC BLOOD PRESSURE < 130 MM HG: ICD-10-PCS | Mod: CPTII,S$GLB,, | Performed by: INTERNAL MEDICINE

## 2022-04-26 PROCEDURE — 3044F PR MOST RECENT HEMOGLOBIN A1C LEVEL <7.0%: ICD-10-PCS | Mod: CPTII,S$GLB,, | Performed by: INTERNAL MEDICINE

## 2022-04-26 PROCEDURE — 99213 PR OFFICE/OUTPT VISIT, EST, LEVL III, 20-29 MIN: ICD-10-PCS | Mod: S$GLB,,, | Performed by: INTERNAL MEDICINE

## 2022-04-26 PROCEDURE — 99999 PR PBB SHADOW E&M-EST. PATIENT-LVL V: CPT | Mod: PBBFAC,,, | Performed by: INTERNAL MEDICINE

## 2022-04-26 PROCEDURE — 3008F PR BODY MASS INDEX (BMI) DOCUMENTED: ICD-10-PCS | Mod: CPTII,S$GLB,, | Performed by: INTERNAL MEDICINE

## 2022-04-26 PROCEDURE — 3078F DIAST BP <80 MM HG: CPT | Mod: CPTII,S$GLB,, | Performed by: INTERNAL MEDICINE

## 2022-04-26 PROCEDURE — 3074F SYST BP LT 130 MM HG: CPT | Mod: CPTII,S$GLB,, | Performed by: INTERNAL MEDICINE

## 2022-04-26 PROCEDURE — 3078F PR MOST RECENT DIASTOLIC BLOOD PRESSURE < 80 MM HG: ICD-10-PCS | Mod: CPTII,S$GLB,, | Performed by: INTERNAL MEDICINE

## 2022-04-26 RX ORDER — ACETAMINOPHEN AND CODEINE PHOSPHATE 300; 30 MG/1; MG/1
1 TABLET ORAL 3 TIMES DAILY PRN
Qty: 21 TABLET | Refills: 0 | Status: SHIPPED | OUTPATIENT
Start: 2022-04-26 | End: 2022-05-06

## 2022-04-30 VITALS
OXYGEN SATURATION: 98 % | DIASTOLIC BLOOD PRESSURE: 70 MMHG | TEMPERATURE: 99 F | HEART RATE: 84 BPM | SYSTOLIC BLOOD PRESSURE: 110 MMHG | BODY MASS INDEX: 26.61 KG/M2 | WEIGHT: 155.88 LBS | HEIGHT: 64 IN

## 2022-04-30 NOTE — PROGRESS NOTES
Subjective:       Patient ID: Rhea Gilman is a 27 y.o. female.    Chief Complaint: Follow-up    HPI  She is here for emergency room follow-up.  Please see emergency department records    Past medical history:  Atrial fibrillation    Medications:  See med list    Allergies:  Penicillin      Review of Systems   Constitutional: Negative for chills, fatigue, fever and unexpected weight change.   Respiratory: Negative for chest tightness and shortness of breath.    Cardiovascular: Negative for chest pain and palpitations.   Gastrointestinal: Negative for abdominal pain and blood in stool.   Neurological: Negative for dizziness, syncope, numbness and headaches.       Objective:      Physical Exam  HENT:      Right Ear: External ear normal.      Left Ear: External ear normal.      Nose: Nose normal.      Mouth/Throat:      Mouth: Mucous membranes are moist.      Pharynx: Oropharynx is clear.   Eyes:      Pupils: Pupils are equal, round, and reactive to light.   Cardiovascular:      Rate and Rhythm: Normal rate and regular rhythm.      Heart sounds: No murmur heard.  Pulmonary:      Breath sounds: Normal breath sounds.   Chest:   Breasts:      Right: No axillary adenopathy.      Left: No axillary adenopathy.       Abdominal:      General: There is no distension.      Palpations: There is no hepatomegaly or splenomegaly.      Tenderness: There is no abdominal tenderness.   Musculoskeletal:      Cervical back: Normal range of motion.   Lymphadenopathy:      Cervical: No cervical adenopathy.      Upper Body:      Right upper body: No axillary adenopathy.      Left upper body: No axillary adenopathy.   Neurological:      Cranial Nerves: No cranial nerve deficit.      Sensory: No sensory deficit.      Motor: Motor function is intact.      Deep Tendon Reflexes: Reflexes are normal and symmetric.         Assessment/Plan       Assessment and plan:  1. Ovarian cyst:  Follow up with Gynecology  2. Atrial fibrillation:  Follow up with  Cardiology  3. She was here for urgent care only appointment.  She will return to clinic for a physical

## 2022-05-04 ENCOUNTER — PATIENT OUTREACH (OUTPATIENT)
Dept: ADMINISTRATIVE | Facility: OTHER | Age: 27
End: 2022-05-04
Payer: COMMERCIAL

## 2022-05-05 ENCOUNTER — OFFICE VISIT (OUTPATIENT)
Dept: OBSTETRICS AND GYNECOLOGY | Facility: CLINIC | Age: 27
End: 2022-05-05
Payer: COMMERCIAL

## 2022-05-05 VITALS
DIASTOLIC BLOOD PRESSURE: 68 MMHG | SYSTOLIC BLOOD PRESSURE: 116 MMHG | WEIGHT: 154.31 LBS | BODY MASS INDEX: 26.49 KG/M2

## 2022-05-05 DIAGNOSIS — N94.10 DYSPAREUNIA, FEMALE: ICD-10-CM

## 2022-05-05 DIAGNOSIS — N83.201 CYSTS OF BOTH OVARIES: ICD-10-CM

## 2022-05-05 DIAGNOSIS — Z01.419 WELL WOMAN EXAM: ICD-10-CM

## 2022-05-05 DIAGNOSIS — N83.202 CYSTS OF BOTH OVARIES: ICD-10-CM

## 2022-05-05 DIAGNOSIS — Z12.4 SCREENING FOR CERVICAL CANCER: Primary | ICD-10-CM

## 2022-05-05 PROCEDURE — 3078F PR MOST RECENT DIASTOLIC BLOOD PRESSURE < 80 MM HG: ICD-10-PCS | Mod: CPTII,S$GLB,, | Performed by: OBSTETRICS & GYNECOLOGY

## 2022-05-05 PROCEDURE — 99385 PREV VISIT NEW AGE 18-39: CPT | Mod: 25,1E,GY,S$GLB | Performed by: OBSTETRICS & GYNECOLOGY

## 2022-05-05 PROCEDURE — 87624 HPV HI-RISK TYP POOLED RSLT: CPT | Performed by: OBSTETRICS & GYNECOLOGY

## 2022-05-05 PROCEDURE — 99203 PR OFFICE/OUTPT VISIT, NEW, LEVL III, 30-44 MIN: ICD-10-PCS | Mod: 25,S$GLB,, | Performed by: OBSTETRICS & GYNECOLOGY

## 2022-05-05 PROCEDURE — 3008F BODY MASS INDEX DOCD: CPT | Mod: CPTII,S$GLB,, | Performed by: OBSTETRICS & GYNECOLOGY

## 2022-05-05 PROCEDURE — 1159F PR MEDICATION LIST DOCUMENTED IN MEDICAL RECORD: ICD-10-PCS | Mod: CPTII,S$GLB,, | Performed by: OBSTETRICS & GYNECOLOGY

## 2022-05-05 PROCEDURE — 3074F PR MOST RECENT SYSTOLIC BLOOD PRESSURE < 130 MM HG: ICD-10-PCS | Mod: CPTII,S$GLB,, | Performed by: OBSTETRICS & GYNECOLOGY

## 2022-05-05 PROCEDURE — 99385 PR PREVENTIVE VISIT,NEW,18-39: ICD-10-PCS | Mod: 25,1E,GY,S$GLB | Performed by: OBSTETRICS & GYNECOLOGY

## 2022-05-05 PROCEDURE — 3044F PR MOST RECENT HEMOGLOBIN A1C LEVEL <7.0%: ICD-10-PCS | Mod: CPTII,S$GLB,, | Performed by: OBSTETRICS & GYNECOLOGY

## 2022-05-05 PROCEDURE — 3078F DIAST BP <80 MM HG: CPT | Mod: CPTII,S$GLB,, | Performed by: OBSTETRICS & GYNECOLOGY

## 2022-05-05 PROCEDURE — 99999 PR PBB SHADOW E&M-EST. PATIENT-LVL III: CPT | Mod: PBBFAC,,, | Performed by: OBSTETRICS & GYNECOLOGY

## 2022-05-05 PROCEDURE — 3008F PR BODY MASS INDEX (BMI) DOCUMENTED: ICD-10-PCS | Mod: CPTII,S$GLB,, | Performed by: OBSTETRICS & GYNECOLOGY

## 2022-05-05 PROCEDURE — 99203 OFFICE O/P NEW LOW 30 MIN: CPT | Mod: 25,S$GLB,, | Performed by: OBSTETRICS & GYNECOLOGY

## 2022-05-05 PROCEDURE — 87625 HPV TYPES 16 & 18 ONLY: CPT | Performed by: OBSTETRICS & GYNECOLOGY

## 2022-05-05 PROCEDURE — 3044F HG A1C LEVEL LT 7.0%: CPT | Mod: CPTII,S$GLB,, | Performed by: OBSTETRICS & GYNECOLOGY

## 2022-05-05 PROCEDURE — 99999 PR PBB SHADOW E&M-EST. PATIENT-LVL III: ICD-10-PCS | Mod: PBBFAC,,, | Performed by: OBSTETRICS & GYNECOLOGY

## 2022-05-05 PROCEDURE — 3074F SYST BP LT 130 MM HG: CPT | Mod: CPTII,S$GLB,, | Performed by: OBSTETRICS & GYNECOLOGY

## 2022-05-05 PROCEDURE — 1160F PR REVIEW ALL MEDS BY PRESCRIBER/CLIN PHARMACIST DOCUMENTED: ICD-10-PCS | Mod: CPTII,S$GLB,, | Performed by: OBSTETRICS & GYNECOLOGY

## 2022-05-05 PROCEDURE — 1160F RVW MEDS BY RX/DR IN RCRD: CPT | Mod: CPTII,S$GLB,, | Performed by: OBSTETRICS & GYNECOLOGY

## 2022-05-05 PROCEDURE — 88175 CYTOPATH C/V AUTO FLUID REDO: CPT | Performed by: OBSTETRICS & GYNECOLOGY

## 2022-05-05 PROCEDURE — 1159F MED LIST DOCD IN RCRD: CPT | Mod: CPTII,S$GLB,, | Performed by: OBSTETRICS & GYNECOLOGY

## 2022-05-05 RX ORDER — NORGESTIMATE AND ETHINYL ESTRADIOL 0.25-0.035
1 KIT ORAL DAILY
Qty: 84 TABLET | Refills: 2 | Status: SHIPPED | OUTPATIENT
Start: 2022-05-05 | End: 2022-09-09

## 2022-05-05 RX ORDER — NAPROXEN 500 MG/1
500 TABLET ORAL 2 TIMES DAILY PRN
COMMUNITY
Start: 2022-04-25 | End: 2022-10-06 | Stop reason: SDUPTHER

## 2022-05-05 NOTE — PROGRESS NOTES
Subjective:       Patient ID: Rhea Gilman is a 27 y.o. female.    Chief Complaint:  Annual Exam      History of Present Illness  HPI  Annual Exam-Premenopausal  Patient presents for annual exam. The patient has no complaints today. The patient is sexually active. GYN screening history: last pap: was normal. The patient wears seatbelts: yes. The patient participates in regular exercise: yes. Has the patient ever been transfused or tattooed?: no. The patient reports that there is not domestic violence in her life.    GYN & OB History  Patient's last menstrual period was 2022.   Date of Last Pap: 2022    OB History    Para Term  AB Living   1 1 1     1   SAB IAB Ectopic Multiple Live Births         0 1      # Outcome Date GA Lbr Carlitos/2nd Weight Sex Delivery Anes PTL Lv   1 Term 17 39w1d  2.665 kg (5 lb 14 oz) F Vag-Spont EPI  KIMI       Review of Systems  Review of Systems   Constitutional: Negative for activity change, appetite change and fatigue.   HENT: Negative.  Negative for tinnitus.    Eyes: Negative.    Respiratory: Negative for cough and shortness of breath.    Cardiovascular: Negative for chest pain and palpitations.   Gastrointestinal: Negative.  Negative for abdominal pain, blood in stool, constipation, diarrhea and nausea.   Endocrine: Negative.  Negative for hot flashes.   Genitourinary: Negative for dysmenorrhea, dyspareunia, dysuria, frequency, menstrual problem, pelvic pain, vaginal discharge, urinary incontinence and postcoital bleeding.   Musculoskeletal: Negative for back pain and joint swelling.   Integumentary:  Negative for rash, breast mass, nipple discharge and breast skin changes.   Neurological: Negative.  Negative for headaches.   Hematological: Negative.  Does not bruise/bleed easily.   Psychiatric/Behavioral: The patient is not nervous/anxious.    Breast: negative.  Negative for mass, nipple discharge and skin changes          Objective:    Physical Exam:    Constitutional: Vital signs are normal. She appears well-developed and well-nourished. She is active and cooperative. She does not appear ill. No distress.    HENT:   Head: Normocephalic and atraumatic.   Nose: Nose normal.   Mouth/Throat: Oropharynx is clear and moist and mucous membranes are normal.    Eyes: Pupils are equal, round, and reactive to light. Conjunctivae, EOM and lids are normal.    Neck: No thyroid mass and no thyromegaly present.    Cardiovascular: Normal rate, regular rhythm, S1 normal, S2 normal, normal heart sounds and normal pulses.     Pulmonary/Chest: Effort normal and breath sounds normal. No accessory muscle usage. Right breast exhibits no inverted nipple, no mass, no nipple discharge, no skin change, no tenderness and no swelling. Left breast exhibits no inverted nipple, no mass, no nipple discharge, no skin change, no tenderness and no swelling.        Abdominal: Soft. Bowel sounds are normal. She exhibits no distension and no mass. There is no abdominal tenderness. There is no rebound and no guarding.     Genitourinary:    Vagina and uterus normal.      Pelvic exam was performed with patient supine.   The external female genitalia was normal.   Labial bartholins normal.There is no tenderness or injury on the right labia. There is no tenderness or injury on the left labia. Cervix is normal. Right adnexum displays no mass and no fullness. Left adnexum displays no mass and no fullness. No erythema,  no vaginal discharge, rectocele or cystocele in the vagina. Cervix exhibits no motion tenderness and no discharge. Uterus is not deviated and not hosting fibroids. Normal urethral meatus.Urethral Meatus exhibits: urethral lesionUrethra findings: no tendernessBladder findings: no bladder tenderness              Neurological: She is alert. She has normal strength.    Skin: Skin is warm and dry.    Psychiatric: She has a normal mood and affect. Her behavior is normal. Thought content normal. Her  mood appears not anxious. Her affect is not inappropriate. Her speech is not slurred. She is not agitated and not aggressive. Thought content is not paranoid. She expresses no suicidal plans and no homicidal plans.          Assessment:        1. Screening for cervical cancer    2. Well woman exam                        Plan:      Rhea was seen today for annual exam.    Diagnoses and all orders for this visit:    Screening for cervical cancer  -     HPV High Risk Genotypes, PCR  -     Liquid-Based Pap Smear, Screening    Well woman exam  -     Ambulatory referral/consult to Obstetrics / Gynecology

## 2022-05-13 LAB
CLINICAL INFO: ABNORMAL
CYTO CVX: ABNORMAL
CYTOLOGIST CVX/VAG CYTO: ABNORMAL
CYTOLOGIST CVX/VAG CYTO: ABNORMAL
CYTOLOGY CMNT CVX/VAG CYTO-IMP: ABNORMAL
CYTOLOGY PAP THIN PREP EXPLANATION: ABNORMAL
DATE OF PREVIOUS PAP: ABNORMAL
DATE PREVIOUS BX: NO
GEN CATEG CVX/VAG CYTO-IMP: ABNORMAL
HPV I/H RISK 4 DNA CVX QL NAA+PROBE: DETECTED
HPV16 DNA CVX QL PROBE+SIG AMP: NOT DETECTED
HPV18 DNA CVX QL PROBE+SIG AMP: NOT DETECTED
LMP START DATE: ABNORMAL
MICROORGANISM CVX/VAG CYTO: ABNORMAL
PATHOLOGIST CVX/VAG CYTO: ABNORMAL
SERVICE CMNT-IMP: ABNORMAL
SPECIMEN SOURCE CVX/VAG CYTO: ABNORMAL
STAT OF ADQ CVX/VAG CYTO-IMP: ABNORMAL

## 2022-05-19 ENCOUNTER — TELEPHONE (OUTPATIENT)
Dept: OBSTETRICS AND GYNECOLOGY | Facility: CLINIC | Age: 27
End: 2022-05-19
Payer: COMMERCIAL

## 2022-06-08 ENCOUNTER — HOSPITAL ENCOUNTER (OUTPATIENT)
Dept: RADIOLOGY | Facility: HOSPITAL | Age: 27
Discharge: HOME | End: 2022-06-08
Attending: OBSTETRICS & GYNECOLOGY
Payer: COMMERCIAL

## 2022-06-08 DIAGNOSIS — N83.201 CYSTS OF BOTH OVARIES: ICD-10-CM

## 2022-06-08 DIAGNOSIS — N94.10 DYSPAREUNIA, FEMALE: ICD-10-CM

## 2022-06-08 DIAGNOSIS — N83.202 CYSTS OF BOTH OVARIES: ICD-10-CM

## 2022-06-08 PROCEDURE — 76830 TRANSVAGINAL US NON-OB: CPT | Mod: TC

## 2022-06-08 PROCEDURE — 76830 TRANSVAGINAL US NON-OB: CPT | Mod: 26,,, | Performed by: RADIOLOGY

## 2022-06-08 PROCEDURE — 76856 US PELVIS COMP WITH TRANSVAG NON-OB (XPD): ICD-10-PCS | Mod: 26,,, | Performed by: RADIOLOGY

## 2022-06-08 PROCEDURE — 76830 US PELVIS COMP WITH TRANSVAG NON-OB (XPD): ICD-10-PCS | Mod: 26,,, | Performed by: RADIOLOGY

## 2022-06-08 PROCEDURE — 76856 US EXAM PELVIC COMPLETE: CPT | Mod: 26,,, | Performed by: RADIOLOGY

## 2022-06-13 ENCOUNTER — OFFICE VISIT (OUTPATIENT)
Dept: OBSTETRICS AND GYNECOLOGY | Facility: CLINIC | Age: 27
End: 2022-06-13
Payer: COMMERCIAL

## 2022-06-13 VITALS — WEIGHT: 151.44 LBS | SYSTOLIC BLOOD PRESSURE: 102 MMHG | BODY MASS INDEX: 26 KG/M2 | DIASTOLIC BLOOD PRESSURE: 62 MMHG

## 2022-06-13 DIAGNOSIS — N83.12: ICD-10-CM

## 2022-06-13 DIAGNOSIS — N80.03 ADENOMYOSIS: ICD-10-CM

## 2022-06-13 DIAGNOSIS — R87.612 LGSIL ON PAP SMEAR OF CERVIX: Primary | ICD-10-CM

## 2022-06-13 PROCEDURE — 3044F PR MOST RECENT HEMOGLOBIN A1C LEVEL <7.0%: ICD-10-PCS | Mod: CPTII,S$GLB,, | Performed by: OBSTETRICS & GYNECOLOGY

## 2022-06-13 PROCEDURE — 99999 PR PBB SHADOW E&M-EST. PATIENT-LVL III: CPT | Mod: PBBFAC,,, | Performed by: OBSTETRICS & GYNECOLOGY

## 2022-06-13 PROCEDURE — 3074F SYST BP LT 130 MM HG: CPT | Mod: CPTII,S$GLB,, | Performed by: OBSTETRICS & GYNECOLOGY

## 2022-06-13 PROCEDURE — 99214 OFFICE O/P EST MOD 30 MIN: CPT | Mod: S$GLB,,, | Performed by: OBSTETRICS & GYNECOLOGY

## 2022-06-13 PROCEDURE — 1160F PR REVIEW ALL MEDS BY PRESCRIBER/CLIN PHARMACIST DOCUMENTED: ICD-10-PCS | Mod: CPTII,S$GLB,, | Performed by: OBSTETRICS & GYNECOLOGY

## 2022-06-13 PROCEDURE — 3008F PR BODY MASS INDEX (BMI) DOCUMENTED: ICD-10-PCS | Mod: CPTII,S$GLB,, | Performed by: OBSTETRICS & GYNECOLOGY

## 2022-06-13 PROCEDURE — 3044F HG A1C LEVEL LT 7.0%: CPT | Mod: CPTII,S$GLB,, | Performed by: OBSTETRICS & GYNECOLOGY

## 2022-06-13 PROCEDURE — 99999 PR PBB SHADOW E&M-EST. PATIENT-LVL III: ICD-10-PCS | Mod: PBBFAC,,, | Performed by: OBSTETRICS & GYNECOLOGY

## 2022-06-13 PROCEDURE — 1160F RVW MEDS BY RX/DR IN RCRD: CPT | Mod: CPTII,S$GLB,, | Performed by: OBSTETRICS & GYNECOLOGY

## 2022-06-13 PROCEDURE — 3008F BODY MASS INDEX DOCD: CPT | Mod: CPTII,S$GLB,, | Performed by: OBSTETRICS & GYNECOLOGY

## 2022-06-13 PROCEDURE — 99214 PR OFFICE/OUTPT VISIT, EST, LEVL IV, 30-39 MIN: ICD-10-PCS | Mod: S$GLB,,, | Performed by: OBSTETRICS & GYNECOLOGY

## 2022-06-13 PROCEDURE — 1159F MED LIST DOCD IN RCRD: CPT | Mod: CPTII,S$GLB,, | Performed by: OBSTETRICS & GYNECOLOGY

## 2022-06-13 PROCEDURE — 3078F DIAST BP <80 MM HG: CPT | Mod: CPTII,S$GLB,, | Performed by: OBSTETRICS & GYNECOLOGY

## 2022-06-13 PROCEDURE — 3078F PR MOST RECENT DIASTOLIC BLOOD PRESSURE < 80 MM HG: ICD-10-PCS | Mod: CPTII,S$GLB,, | Performed by: OBSTETRICS & GYNECOLOGY

## 2022-06-13 PROCEDURE — 1159F PR MEDICATION LIST DOCUMENTED IN MEDICAL RECORD: ICD-10-PCS | Mod: CPTII,S$GLB,, | Performed by: OBSTETRICS & GYNECOLOGY

## 2022-06-13 PROCEDURE — 3074F PR MOST RECENT SYSTOLIC BLOOD PRESSURE < 130 MM HG: ICD-10-PCS | Mod: CPTII,S$GLB,, | Performed by: OBSTETRICS & GYNECOLOGY

## 2022-06-13 NOTE — PROGRESS NOTES
Subjective:       Patient ID: Rhea Gilman is a 27 y.o. female.    Chief Complaint:  Follow-up (Ultrasound )      History of Present Illness  Follow-up  Pertinent negatives include no abdominal pain, chest pain, coughing, fatigue, headaches, joint swelling, nausea or rash.   Gynecologic Exam  The patient's pertinent negatives include no pelvic pain or vaginal discharge. Pertinent negatives include no abdominal pain, back pain, constipation, diarrhea, dysuria, frequency, headaches, nausea or rash. Her past medical history is significant for ovarian cysts.     Pt is 27 y.o. with Patient's last menstrual period was 2022 (exact date). here to discuss a few issues:  1) abnl pap -- last pap was LSIL.  No hx of prior abnl pap  2) ovarian cyst -- 1st diagnosed with a right ovarian cyst in 2022. She was then started on continuous birth control pills.  She saw me and had a repeat ultrasound in 2022.  The right ovarian cyst appears resolved.  There was a left hemorrhagic cyst.  The ultrasound also identified what looks like adenomyosis.  Patient states that she has a history of painful cycles before she was pregnant.  No other uterine surgery.  She is tolerating birth control pills well.      GYN & OB History  Patient's last menstrual period was 2022 (exact date).   Date of Last Pap: No result found    OB History    Para Term  AB Living   1 1 1     1   SAB IAB Ectopic Multiple Live Births         0 1      # Outcome Date GA Lbr Carlitos/2nd Weight Sex Delivery Anes PTL Lv   1 Term 17 39w1d  2.665 kg (5 lb 14 oz) F Vag-Spont EPI  KIMI       Review of Systems  Review of Systems   Constitutional: Negative for activity change, appetite change and fatigue.   HENT: Negative.  Negative for tinnitus.    Eyes: Negative.    Respiratory: Negative for cough and shortness of breath.    Cardiovascular: Negative for chest pain and palpitations.   Gastrointestinal: Negative.  Negative for abdominal  pain, blood in stool, constipation, diarrhea and nausea.   Endocrine: Negative.  Negative for hot flashes.   Genitourinary: Positive for dysmenorrhea. Negative for dyspareunia, dysuria, frequency, menstrual problem, pelvic pain, vaginal discharge, urinary incontinence and postcoital bleeding.   Musculoskeletal: Negative for back pain and joint swelling.   Integumentary:  Negative for rash, breast mass, nipple discharge and breast skin changes.   Neurological: Negative.  Negative for headaches.   Hematological: Negative.  Does not bruise/bleed easily.   Psychiatric/Behavioral: The patient is not nervous/anxious.    Breast: negative.  Negative for mass, nipple discharge and skin changes          Objective:    Physical Exam     def  Assessment:        1. LGSIL on Pap smear of cervix    2. Hemorrhage of corpus luteum cyst of left ovary    3. Adenomyosis                Plan:      Rhea was seen today for follow-up.    Diagnoses and all orders for this visit:    LGSIL on Pap smear of cervix  Will coordinate colposcopy    Hemorrhage of corpus luteum cyst of left ovary  Spent approx 30 min with pt; This includes face to face time and non-face to face time preparing to see the patient (eg, review of tests), obtaining and/or reviewing separately obtained history, documenting clinical information in the electronic or other health record, independently interpreting results and communicating results to the patient/family/caregiver, or care coordinator.    We discussed how the birth control pills would help prevent new cyst formation of her ovary.  Since her pain is improved, would continue utilizing the birth control pills regularly.  She is not looking to get pregnant in the near future.  She is aware that she can stop birth control pills whenever she decides to proceed with future fertility.  We also discussed how birth control pills will help with her adenomyosis.    Adenomyosis

## 2022-06-14 ENCOUNTER — TELEPHONE (OUTPATIENT)
Dept: OBSTETRICS AND GYNECOLOGY | Facility: CLINIC | Age: 27
End: 2022-06-14
Payer: COMMERCIAL

## 2022-06-14 NOTE — TELEPHONE ENCOUNTER
----- Message from Brock Reeves sent at 6/14/2022  8:09 AM CDT -----  Who called?:PT      What is the request in detail:PT would like to schedule an appointment. Please advise         Can the clinic reply by [facility]?:No        Best Call Back Number:[phone]

## 2022-06-14 NOTE — TELEPHONE ENCOUNTER
----- Message from Brock Reeves sent at 6/14/2022  8:03 AM CDT -----  Who called?:PT      What is the request in detail:PT would like to cancel her procedure. Please advise         Can the clinic reply by [facility]?:No        Best Call Back Number:[phone]

## 2022-06-16 ENCOUNTER — TELEPHONE (OUTPATIENT)
Dept: OBSTETRICS AND GYNECOLOGY | Facility: CLINIC | Age: 27
End: 2022-06-16
Payer: COMMERCIAL

## 2022-06-16 ENCOUNTER — PATIENT MESSAGE (OUTPATIENT)
Dept: OBSTETRICS AND GYNECOLOGY | Facility: CLINIC | Age: 27
End: 2022-06-16
Payer: COMMERCIAL

## 2022-06-16 DIAGNOSIS — E88.819 INSULIN RESISTANCE: ICD-10-CM

## 2022-06-16 RX ORDER — SEMAGLUTIDE 1.34 MG/ML
1 INJECTION, SOLUTION SUBCUTANEOUS
Qty: 3 PEN | Refills: 1 | Status: SHIPPED | OUTPATIENT
Start: 2022-06-16 | End: 2023-01-27

## 2022-06-16 NOTE — TELEPHONE ENCOUNTER
Returned call to the patient. No answer, unable to leave voicemail message for the patient to call the clinic back. Mailbox full.

## 2022-06-16 NOTE — TELEPHONE ENCOUNTER
----- Message from Franchesca Uribe sent at 6/16/2022 11:56 AM CDT -----  Name Of Caller: Rhea     Provider Name: Patricia Bustos     Does patient feel the need to be seen today?    Relationship to the Pt?: pt    Contact Preference?: [phone]    What is the nature of the call?:Pt called and her obgyn told her to see you because you do the surgery she needs for endometriosis please call her back for appt

## 2022-07-15 ENCOUNTER — PATIENT MESSAGE (OUTPATIENT)
Dept: OBSTETRICS AND GYNECOLOGY | Facility: CLINIC | Age: 27
End: 2022-07-15
Payer: COMMERCIAL

## 2022-08-03 ENCOUNTER — PATIENT MESSAGE (OUTPATIENT)
Dept: BARIATRICS | Facility: CLINIC | Age: 27
End: 2022-08-03
Payer: COMMERCIAL

## 2022-08-12 ENCOUNTER — TELEPHONE (OUTPATIENT)
Dept: BARIATRICS | Facility: CLINIC | Age: 27
End: 2022-08-12
Payer: COMMERCIAL

## 2022-09-01 ENCOUNTER — TELEPHONE (OUTPATIENT)
Dept: OPHTHALMOLOGY | Facility: CLINIC | Age: 27
End: 2022-09-01
Payer: COMMERCIAL

## 2022-09-01 NOTE — TELEPHONE ENCOUNTER
----- Message from Aleja Dao sent at 9/1/2022 11:07 AM CDT -----  Contact: Rhea @[phone]  Pt states her left eye has become more blurred and she wants to be seen today . Please give her a call back

## 2022-09-07 ENCOUNTER — OFFICE VISIT (OUTPATIENT)
Dept: OPTOMETRY | Facility: CLINIC | Age: 27
End: 2022-09-07
Payer: COMMERCIAL

## 2022-09-07 DIAGNOSIS — H52.202 MYOPIA OF LEFT EYE WITH ASTIGMATISM: ICD-10-CM

## 2022-09-07 DIAGNOSIS — H52.12 MYOPIA OF LEFT EYE WITH ASTIGMATISM: ICD-10-CM

## 2022-09-07 DIAGNOSIS — H52.11 MYOPIA OF RIGHT EYE: ICD-10-CM

## 2022-09-07 DIAGNOSIS — Z01.00 EXAMINATION OF EYES AND VISION: Primary | ICD-10-CM

## 2022-09-07 DIAGNOSIS — Z13.5 SCREENING FOR EYE CONDITION: ICD-10-CM

## 2022-09-07 PROCEDURE — 92014 COMPRE OPH EXAM EST PT 1/>: CPT | Mod: S$GLB,,, | Performed by: OPTOMETRIST

## 2022-09-07 PROCEDURE — 92015 PR REFRACTION: ICD-10-PCS | Mod: S$GLB,,, | Performed by: OPTOMETRIST

## 2022-09-07 PROCEDURE — 99999 PR PBB SHADOW E&M-EST. PATIENT-LVL III: ICD-10-PCS | Mod: PBBFAC,,, | Performed by: OPTOMETRIST

## 2022-09-07 PROCEDURE — 99999 PR PBB SHADOW E&M-EST. PATIENT-LVL III: CPT | Mod: PBBFAC,,, | Performed by: OPTOMETRIST

## 2022-09-07 PROCEDURE — 92015 DETERMINE REFRACTIVE STATE: CPT | Mod: S$GLB,,, | Performed by: OPTOMETRIST

## 2022-09-07 PROCEDURE — 92014 PR EYE EXAM, EST PATIENT,COMPREHESV: ICD-10-PCS | Mod: S$GLB,,, | Performed by: OPTOMETRIST

## 2022-09-08 ENCOUNTER — TELEPHONE (OUTPATIENT)
Dept: BARIATRICS | Facility: CLINIC | Age: 27
End: 2022-09-08
Payer: COMMERCIAL

## 2022-09-08 NOTE — TELEPHONE ENCOUNTER
Canceled rx Diethylpropion at [facility] pharmacy on [address] due to pt stated co pay is $50 for a 1 mo supply.     Called in a verbal for Diethylpropion 75 mg to requested pharmacy [facility] on [address].

## 2022-09-09 ENCOUNTER — OFFICE VISIT (OUTPATIENT)
Dept: BARIATRICS | Facility: CLINIC | Age: 27
End: 2022-09-09
Payer: COMMERCIAL

## 2022-09-09 VITALS
BODY MASS INDEX: 26.6 KG/M2 | HEIGHT: 64 IN | WEIGHT: 155.81 LBS | OXYGEN SATURATION: 100 % | HEART RATE: 105 BPM | DIASTOLIC BLOOD PRESSURE: 64 MMHG | SYSTOLIC BLOOD PRESSURE: 115 MMHG

## 2022-09-09 DIAGNOSIS — G56.00 CARPAL TUNNEL SYNDROME, UNSPECIFIED LATERALITY: Primary | ICD-10-CM

## 2022-09-09 DIAGNOSIS — E66.3 OVERWEIGHT (BMI 25.0-29.9): ICD-10-CM

## 2022-09-09 PROCEDURE — 3078F DIAST BP <80 MM HG: CPT | Mod: CPTII,S$GLB,, | Performed by: INTERNAL MEDICINE

## 2022-09-09 PROCEDURE — 3008F PR BODY MASS INDEX (BMI) DOCUMENTED: ICD-10-PCS | Mod: CPTII,S$GLB,, | Performed by: INTERNAL MEDICINE

## 2022-09-09 PROCEDURE — 1159F PR MEDICATION LIST DOCUMENTED IN MEDICAL RECORD: ICD-10-PCS | Mod: CPTII,S$GLB,, | Performed by: INTERNAL MEDICINE

## 2022-09-09 PROCEDURE — 99214 PR OFFICE/OUTPT VISIT, EST, LEVL IV, 30-39 MIN: ICD-10-PCS | Mod: S$GLB,,, | Performed by: INTERNAL MEDICINE

## 2022-09-09 PROCEDURE — 99999 PR PBB SHADOW E&M-EST. PATIENT-LVL V: CPT | Mod: PBBFAC,,, | Performed by: INTERNAL MEDICINE

## 2022-09-09 PROCEDURE — 3008F BODY MASS INDEX DOCD: CPT | Mod: CPTII,S$GLB,, | Performed by: INTERNAL MEDICINE

## 2022-09-09 PROCEDURE — 1159F MED LIST DOCD IN RCRD: CPT | Mod: CPTII,S$GLB,, | Performed by: INTERNAL MEDICINE

## 2022-09-09 PROCEDURE — 3044F PR MOST RECENT HEMOGLOBIN A1C LEVEL <7.0%: ICD-10-PCS | Mod: CPTII,S$GLB,, | Performed by: INTERNAL MEDICINE

## 2022-09-09 PROCEDURE — 3074F SYST BP LT 130 MM HG: CPT | Mod: CPTII,S$GLB,, | Performed by: INTERNAL MEDICINE

## 2022-09-09 PROCEDURE — 3044F HG A1C LEVEL LT 7.0%: CPT | Mod: CPTII,S$GLB,, | Performed by: INTERNAL MEDICINE

## 2022-09-09 PROCEDURE — 99999 PR PBB SHADOW E&M-EST. PATIENT-LVL V: ICD-10-PCS | Mod: PBBFAC,,, | Performed by: INTERNAL MEDICINE

## 2022-09-09 PROCEDURE — 3074F PR MOST RECENT SYSTOLIC BLOOD PRESSURE < 130 MM HG: ICD-10-PCS | Mod: CPTII,S$GLB,, | Performed by: INTERNAL MEDICINE

## 2022-09-09 PROCEDURE — 99214 OFFICE O/P EST MOD 30 MIN: CPT | Mod: S$GLB,,, | Performed by: INTERNAL MEDICINE

## 2022-09-09 PROCEDURE — 3078F PR MOST RECENT DIASTOLIC BLOOD PRESSURE < 80 MM HG: ICD-10-PCS | Mod: CPTII,S$GLB,, | Performed by: INTERNAL MEDICINE

## 2022-09-09 RX ORDER — DIETHYLPROPION HYDROCHLORIDE 75 MG/1
75 TABLET, EXTENDED RELEASE ORAL DAILY
Qty: 30 TABLET | Refills: 1 | Status: SHIPPED | OUTPATIENT
Start: 2022-09-09 | End: 2023-02-13

## 2022-09-09 NOTE — PATIENT INSTRUCTIONS
Get a cock-up wrist splint. Wear all the time at night and as needed during the day.             Patient warned of common side effects of diethylpropion including anxiety, insomnia, palpitations and increased blood pressure. It was also explained that it is for short-term usage along with diet and exercise, and that stopping the medication without making lifestyle changes will result in regain of weight. Patient states understanding.    Weight loss medications are controlled substances.  They require routine follow up. Prescription or pills that are lost or destroyed will not be replaced.     Continue  Ozempic 1mg once a week.     Diethylpropion as needed    Decrease portions as soon as you start Ozempic. Avoid fried, greasy, fatty foods.     Some nausea in the first 2 weeks is not unusual.     If you get pain across the upper abdomen and around to your back, please call the office.       [email or web address] for coupon/videos.          1000 calories a day  40%  carbs(100 grams)  30% protein (75 grams)- 50 g minimum!  30 % fat (33 grams)  Food diary or calorie tracking Karlene      Exercise -Add some type of resistance training 2-3 days a week. These can be body weight exercises, light weight or elastic bands. [employer] and [employer] are great sources for free work out plans and videos.     .Tips for preparing for hurricane season:    If you are on weight loss medications, please take your medication with you in case of evacuation. Injectable medications should be transported with an ice pack if unopened or room temperature if you have started to use them.   Hurricane supplies do not necessarily need to be junk food or high in carbs or sugar. Shelf stable and healthy choices to include in your supplies could include:  Canned/packets of tuna or chicken  Apples, oranges, banana, pears  Beef or turkey jerky  Sugar free pudding  Pickle, olives, dill relish (mix with the tuna or chicken!)  Low sodium or no salt added canned  vegetables  If you get bread, get lite bread (40 calories a slice)  0 sugar sports drinks  Water  String cheese will be okay for a few days without refrigeration or in an ice chest.   Peanut or other nut butter.   Parmesan crisps  Pork skins  Protein shakes (20-30g protein, less than 5 g sugar)  Protein bars (15 or more g protein, less than 5 g sugar)  Don't forget disposable forks, spoons, plates in your supplies  If evacuated, manage stress by taking walks, reading or meditating.   If eating out make better choices when possible.   Salads with a lean protein and limited dressing, cheese or other toppings  Grilled chicken sandwich or burger without the bun.   Skip the fries!  Order water or unsweetened tea instead of soda  Grocery stores with a deli, salad/food bar can be a good quick and affordable option to replace fast food

## 2022-09-09 NOTE — PROGRESS NOTES
"        Subjective:       Patient ID: Rhea Gilman is a 27 y.o. female.    Chief Complaint: Follow-up    CC:Obesity,      Pt here today for follow-up. Has lost 17.2  lbs (-1.5 lbs muscle. -10.5 lbs fat). Has been on 1000 raymond diet and   Completed 6 months wegovy with good results, and then switched to Ozempic 1 mg weekly. Has had reduction mammoplasty.  Did have some insulin resistance. Had tried metformin. Has not been exercising as much. Plans to resume in next week. Very hungry in early morning and night.    HR good today.   She has been doing better with her eating habits. She does feel she her appetite. Denies SE.  DOes complain of getting numbness in hands and decreased . Had an episode of numbness in one  foot in past while on topiramate so it was d/c'ed     New BMR: 1288  New PBF: 39.9      BMR: 1388 (inbody)    Review of Systems   Constitutional: Negative for chills and fever.   Respiratory: Negative for shortness of breath.         + snores.    Cardiovascular: Negative for chest pain, palpitations and leg swelling.   Gastrointestinal: Negative for constipation and diarrhea.        Denies GERD   Genitourinary:        Using condoms for contraception.      Neurological: Negative for dizziness and light-headedness.   Psychiatric/Behavioral: Negative for dysphoric mood. The patient is not nervous/anxious.        Objective:     /64   Pulse 105   Ht 5' 4" (1.626 m)   Wt 70.7 kg (155 lb 12.8 oz)   LMP 09/04/2022   SpO2 100%   BMI 26.74 kg/m²    Physical Exam   Constitutional: She is oriented to person, place, and time. She appears well-developed and well-nourished. No distress.   overweight   HENT:   Head: Normocephalic and atraumatic.   Mouth/Throat: No oropharyngeal exudate.   Eyes:  EOM are normal. No scleral icterus.   Neck: Normal range of motion.  Cardiovascular: Normal rate  Pulmonary/Chest: Effort normal. No respiratory distress  Musculoskeletal: Normal range of motion. She exhibits no edema. " + Phalen's test. Neg Tinel's.  Neurological: She is alert and oriented to person, place, and time. No cranial nerve deficit.   Skin: Skin is warm and dry. No erythema.   Psychiatric: She has a normal mood and affect. Her behavior is normal. Judgment normal.   Vitals reviewed.      Assessment:       1. Carpal tunnel syndrome, unspecified laterality    2. Overweight (BMI 25.0-29.9)          Plan:         Rhea was seen today for follow-up.    Diagnoses and all orders for this visit:    Carpal tunnel syndrome, unspecified laterality  -     TSH; Future  -     Vitamin B12; Future    Overweight (BMI 25.0-29.9)  -     diethylpropion (TENUATE) 75 mg SR tablet; Take 1 tablet (75 mg total) by mouth once daily.         Patient warned of common side effects of diethylpropion including anxiety, insomnia, palpitations and increased blood pressure. It was also explained that it is for short-term usage along with diet and exercise, and that stopping the medication without making lifestyle changes will result in regain of weight. Patient states understanding.    Weight loss medications are controlled substances.  They require routine follow up. Prescription or pills that are lost or destroyed will not be replaced.     Continue  Ozempic 1mg once a week.     Diethylpropion as needed    Decrease portions as soon as you start Ozempic. Avoid fried, greasy, fatty foods.     Some nausea in the first 2 weeks is not unusual.     If you get pain across the upper abdomen and around to your back, please call the office.       [email or web address] for coupon/videos.          1000 calories a day  40%  carbs(100 grams)  30% protein (75 grams)- 50 g minimum!  30 % fat (33 grams)  Food diary or calorie tracking Karlene      Exercise -Add some type of resistance training 2-3 days a week. These can be body weight exercises, light weight or elastic bands. [employer] and [employer] are great sources for free work out plans and videos.     .       CTS handouts  and hurricane tips given.

## 2022-09-11 NOTE — PATIENT INSTRUCTIONS
Good ocular health in both eyes.  Myopia in the right eye.  Myopia with astigmatism in the left eye.  Spectacle lens RX issued for use as desired.    Recheck in 24 months - or prior, if any problems noted in the interim

## 2022-09-11 NOTE — PROGRESS NOTES
"HPI     eye examination and refraction.            Comments: In for general eye exam and refraction.  Feels that VA with glasses has decreased in OS.    VA in OD with glasses "okay"          Comments    Patient's age: 27 y.o. 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 Gerardo  Wears glasses? yes     If yes, wears  Full-time or part-time?  Part-time   Present glasses are: Bifocal, SV Distance, SV Reading?  Single vision   reading   Approximate age of present glasses:  18 months +   Got new glasses following last exam, or subsequently?:  yes   Any problem with VA with glasses? Patient feel she need an updated   glasses rx   Wears CLs?:  no      Headaches?  Yes, history of migraine.  no longer taking Topamax   Eye pain/discomfort? Left eye blurry vision and painful sometimes (achy,   burning sometimes.  Itching.                                                                        Flashes?  no  Floaters?  Yes, OU   Diplopia/Double vision?  no  Patient's Ocular History:         Any eye surgeries? no         Any eye injury?  no         Any treatment for eye disease?  no  Family history of eye disease?  Mother: glaucoma.  None other known  Significant patient medical history:         1. Diabetes?  no       If yes, IDDM or NIDDM? na   2. HBP?  no              3. Other (describe):  no   OTC eyedrops currently using:  No   Prescription eye meds currently using:  no  Any history of allergy/adverse reaction to any eye meds used previously?    no   Any history of allergy/adverse reaction to eyedrops used during prior eye   exam(s)? no   Any history of allergy/adverse reaction to Novacaine or similar meds? no  Any history of allergy/adverse reaction to Epinephrine or similar meds?   no   Patient okay with use of anesthetic eyedrops to check eye pressure?  yes         Patient okay with use of eyedrops to dilate pupils " today?  Yes   Allergies/Medications/Medical History/Family History reviewed today?  yes      PD =   67/63                                                                       Last edited by Kenny Gerardo, OD on 9/7/2022  1:42 PM.            Assessment /Plan     For exam results, see Encounter Report.    1. Examination of eyes and vision        2. Myopia of right eye        3. Myopia of left eye with astigmatism        4. Screening for eye condition                     Good ocular health in both eyes.  Myopia in the right eye.  Myopia with astigmatism in the left eye.  Spectacle lens RX issued for use as desired.    Recheck in 24 months - or prior, if any problems noted in the interim

## 2022-10-06 ENCOUNTER — OFFICE VISIT (OUTPATIENT)
Dept: OBSTETRICS AND GYNECOLOGY | Facility: CLINIC | Age: 27
End: 2022-10-06
Payer: COMMERCIAL

## 2022-10-06 ENCOUNTER — PATIENT MESSAGE (OUTPATIENT)
Dept: BARIATRICS | Facility: CLINIC | Age: 27
End: 2022-10-06
Payer: COMMERCIAL

## 2022-10-06 VITALS
DIASTOLIC BLOOD PRESSURE: 52 MMHG | WEIGHT: 157.19 LBS | SYSTOLIC BLOOD PRESSURE: 96 MMHG | BODY MASS INDEX: 26.84 KG/M2 | HEIGHT: 64 IN

## 2022-10-06 DIAGNOSIS — N80.03 ADENOMYOSIS OF UTERUS: Primary | ICD-10-CM

## 2022-10-06 PROCEDURE — 3074F PR MOST RECENT SYSTOLIC BLOOD PRESSURE < 130 MM HG: ICD-10-PCS | Mod: CPTII,S$GLB,, | Performed by: OBSTETRICS & GYNECOLOGY

## 2022-10-06 PROCEDURE — 99214 OFFICE O/P EST MOD 30 MIN: CPT | Mod: S$GLB,,, | Performed by: OBSTETRICS & GYNECOLOGY

## 2022-10-06 PROCEDURE — 1159F MED LIST DOCD IN RCRD: CPT | Mod: CPTII,S$GLB,, | Performed by: OBSTETRICS & GYNECOLOGY

## 2022-10-06 PROCEDURE — 99999 PR PBB SHADOW E&M-EST. PATIENT-LVL III: CPT | Mod: PBBFAC,,, | Performed by: OBSTETRICS & GYNECOLOGY

## 2022-10-06 PROCEDURE — 1159F PR MEDICATION LIST DOCUMENTED IN MEDICAL RECORD: ICD-10-PCS | Mod: CPTII,S$GLB,, | Performed by: OBSTETRICS & GYNECOLOGY

## 2022-10-06 PROCEDURE — 3044F PR MOST RECENT HEMOGLOBIN A1C LEVEL <7.0%: ICD-10-PCS | Mod: CPTII,S$GLB,, | Performed by: OBSTETRICS & GYNECOLOGY

## 2022-10-06 PROCEDURE — 1160F RVW MEDS BY RX/DR IN RCRD: CPT | Mod: CPTII,S$GLB,, | Performed by: OBSTETRICS & GYNECOLOGY

## 2022-10-06 PROCEDURE — 3008F PR BODY MASS INDEX (BMI) DOCUMENTED: ICD-10-PCS | Mod: CPTII,S$GLB,, | Performed by: OBSTETRICS & GYNECOLOGY

## 2022-10-06 PROCEDURE — 99214 PR OFFICE/OUTPT VISIT, EST, LEVL IV, 30-39 MIN: ICD-10-PCS | Mod: S$GLB,,, | Performed by: OBSTETRICS & GYNECOLOGY

## 2022-10-06 PROCEDURE — 3044F HG A1C LEVEL LT 7.0%: CPT | Mod: CPTII,S$GLB,, | Performed by: OBSTETRICS & GYNECOLOGY

## 2022-10-06 PROCEDURE — 3078F DIAST BP <80 MM HG: CPT | Mod: CPTII,S$GLB,, | Performed by: OBSTETRICS & GYNECOLOGY

## 2022-10-06 PROCEDURE — 99999 PR PBB SHADOW E&M-EST. PATIENT-LVL III: ICD-10-PCS | Mod: PBBFAC,,, | Performed by: OBSTETRICS & GYNECOLOGY

## 2022-10-06 PROCEDURE — 1160F PR REVIEW ALL MEDS BY PRESCRIBER/CLIN PHARMACIST DOCUMENTED: ICD-10-PCS | Mod: CPTII,S$GLB,, | Performed by: OBSTETRICS & GYNECOLOGY

## 2022-10-06 PROCEDURE — 3008F BODY MASS INDEX DOCD: CPT | Mod: CPTII,S$GLB,, | Performed by: OBSTETRICS & GYNECOLOGY

## 2022-10-06 PROCEDURE — 3074F SYST BP LT 130 MM HG: CPT | Mod: CPTII,S$GLB,, | Performed by: OBSTETRICS & GYNECOLOGY

## 2022-10-06 PROCEDURE — 3078F PR MOST RECENT DIASTOLIC BLOOD PRESSURE < 80 MM HG: ICD-10-PCS | Mod: CPTII,S$GLB,, | Performed by: OBSTETRICS & GYNECOLOGY

## 2022-10-06 NOTE — PROGRESS NOTES
"CC: Endometriosis    Rhea Gilman is a 27 y.o. female  presents for a consultation for management of fibroids.      She reports pain during intercourse. She reports heavy cycles. She uses pads that she has to change every 2 hours and cycles are painful.    She is interested in conceiving.     Ultrasound shows:  FINDINGS:  Uterus:     Size: 7.4 x 3.8 x 4.4 cm     Masses: None     Myometrium: Heterogeneous thickening of the myometrium both anteriorly and posteriorly with alternating areas of posterior acoustic shadowing producing a "Venetian blind"  appearance.  This suggests the possibility of adenomyosis.     Endometrium: Normal in this pre menopausal patient, measuring 6 mm.     Right ovary:     Size: 3.4 x 1.5 x 3.0 cm     Appearance: Normal     Vascular flow: Normal.     Left ovary:     Size: 4.6 x 2.9 x 3.2 cm     Appearance: Cystic lesion with multiple lace-like internal septations measuring 3.1 x 2.5 x 2.5 cm.     Vascular Flow: Normal.     Free Fluid:     Small volume free fluid within the posterior cul-de-sac, likely physiologic.     Impression:     Heterogeneous thickening of the myometrium suggestive for possible adenomyosis.     Complex cystic lesion left ovary, likely representing a hemorrhagic cyst.     Electronically signed by resident: Greg Metz    Past Medical History:   Diagnosis Date    Anemia     Atrial fibrillation     converted with calcium channel blockers at [facility]    Migraine headache        Past Surgical History:   Procedure Laterality Date    AUGMENTATION OF BREAST Bilateral 2022    Procedure: MAMMOPLASTY, AUGMENTATION;  Surgeon: Peterson Schumacher MD;  Location: [facility];  Service: Plastics;  Laterality: Bilateral;       Family History   Problem Relation Age of Onset    Breast cancer Maternal Grandmother     Diabetes Mother     Kidney disease Mother     Vision loss Mother     Colon cancer Neg Hx     Ovarian cancer Neg Hx        Social History     Tobacco Use    Smoking " "status: Former     Packs/day: 0.00     Years: 0.00     Pack years: 0.00     Types: Cigarettes     Quit date: 3/18/2021     Years since quittin.5    Smokeless tobacco: Never    Tobacco comments:     1 pack 3 days   Substance Use Topics    Alcohol use: No    Drug use: Not Currently       OB History    Para Term  AB Living   1 1 1     1   SAB IAB Ectopic Multiple Live Births         0 1      # Outcome Date GA Lbr Carlitos/2nd Weight Sex Delivery Anes PTL Lv   1 Term 17 39w1d  2.665 kg (5 lb 14 oz) F Vag-Spont EPI  KIMI       BP (!) 96/52 (BP Location: Right arm, Patient Position: Sitting, BP Method: Small (Manual))   Ht 5' 4" (1.626 m)   Wt 71.3 kg (157 lb 3 oz)   LMP 2022 (Exact Date)   BMI 26.98 kg/m²     ROS:  GENERAL: Denies weight gain or weight loss. Feeling well overall.   SKIN: Denies rash or lesions.   HEAD: Denies head injury or headache.   NODES: Denies enlarged lymph nodes.   CHEST: Denies chest pain or shortness of breath.   CARDIOVASCULAR: Denies palpitations or left sided chest pain.   ABDOMEN: No abdominal pain, constipation, diarrhea, nausea, vomiting or rectal bleeding.   URINARY: No frequency, dysuria, hematuria, or burning on urination.  REPRODUCTIVE: See HPI.   HEMATOLOGIC: No easy bruisability or excessive bleeding with the exception of menstrual cycles.  MUSCULOSKELETAL: Denies joint pain or swelling.   NEUROLOGIC: Denies syncope or weakness.   PSYCHIATRIC: Denies depression, anxiety or mood swings.    PHYSICAL EXAM:  APPEARANCE: Well nourished, well developed, in no acute distress.  AFFECT: WNL, alert and oriented x 3  SKIN: No acne or hirsutism  NECK: Neck symmetric without masses or thyromegaly  NODES: No inguinal, cervical, axillary, or femoral lymph node enlargement  CHEST: Good respiratory effect  ABDOMEN: Soft.  No tenderness or masses.  No hepatosplenomegaly.  No hernias.  PELVIC: Normal external genitalia without lesions.  Normal hair distribution.  Adequate " perineal body, normal urethral meatus.  Vagina moist and well rugated without lesions or discharge.  Cervix pink, without lesions, discharge or tenderness.  No significant cystocele or rectocele.  Bimanual exam shows uterus to be normal size, regular, mobile and nontender.  Adnexa without masses or tenderness.    EXTREMITIES: No edema.      ICD-10-CM ICD-9-CM    1. Adenomyosis of uterus  N80.03 617.0 MRI Pelvis W WO Contrast          Discussed the pathophysiology of adenomyosis. Discussed the diagnosis can only reliably be made through pathology. Recommended an MRI to better determine if adenomyosis is present. Discussed the impact adenomyosis can have on fertility and recommended f/u with [facility]. Discussed medical management of symptoms associated with adenomyosis - OCP's as best medical management.     Will email with results of above.

## 2022-10-24 ENCOUNTER — HOSPITAL ENCOUNTER (OUTPATIENT)
Dept: RADIOLOGY | Facility: OTHER | Age: 27
Discharge: HOME | End: 2022-10-24
Attending: OBSTETRICS & GYNECOLOGY
Payer: COMMERCIAL

## 2022-10-24 DIAGNOSIS — N80.03 ADENOMYOSIS OF UTERUS: ICD-10-CM

## 2022-10-24 PROCEDURE — 72197 MRI PELVIS W/O & W/DYE: CPT | Mod: 26,,, | Performed by: RADIOLOGY

## 2022-10-24 PROCEDURE — 72197 MRI FEMALE PELVIS W W/O CONTRAST: ICD-10-PCS | Mod: 26,,, | Performed by: RADIOLOGY

## 2022-10-24 PROCEDURE — A9585 GADOBUTROL INJECTION: HCPCS | Performed by: OBSTETRICS & GYNECOLOGY

## 2022-10-24 PROCEDURE — 72197 MRI PELVIS W/O & W/DYE: CPT | Mod: TC

## 2022-10-24 PROCEDURE — 25500020 PHARM REV CODE 255: Performed by: OBSTETRICS & GYNECOLOGY

## 2022-10-24 RX ORDER — GADOBUTROL 604.72 MG/ML
7 INJECTION INTRAVENOUS
Status: COMPLETED | OUTPATIENT
Start: 2022-10-24 | End: 2022-10-24

## 2022-10-24 RX ADMIN — GADOBUTROL 7 ML: 604.72 INJECTION INTRAVENOUS at 06:10

## 2022-10-26 ENCOUNTER — PATIENT MESSAGE (OUTPATIENT)
Dept: OBSTETRICS AND GYNECOLOGY | Facility: CLINIC | Age: 27
End: 2022-10-26
Payer: COMMERCIAL

## 2022-10-26 ENCOUNTER — PATIENT MESSAGE (OUTPATIENT)
Dept: BARIATRICS | Facility: CLINIC | Age: 27
End: 2022-10-26
Payer: COMMERCIAL

## 2022-10-28 ENCOUNTER — PATIENT MESSAGE (OUTPATIENT)
Dept: OBSTETRICS AND GYNECOLOGY | Facility: CLINIC | Age: 27
End: 2022-10-28
Payer: COMMERCIAL

## 2022-10-31 RX ORDER — NORGESTIMATE AND ETHINYL ESTRADIOL 0.25-0.035
1 KIT ORAL DAILY
Qty: 84 TABLET | Refills: 3 | Status: SHIPPED | OUTPATIENT
Start: 2022-10-31 | End: 2023-02-02

## 2022-11-22 ENCOUNTER — PATIENT MESSAGE (OUTPATIENT)
Dept: OBSTETRICS AND GYNECOLOGY | Facility: CLINIC | Age: 27
End: 2022-11-22
Payer: COMMERCIAL

## 2022-12-01 ENCOUNTER — OFFICE VISIT (OUTPATIENT)
Dept: OBSTETRICS AND GYNECOLOGY | Facility: CLINIC | Age: 27
End: 2022-12-01
Payer: COMMERCIAL

## 2022-12-01 DIAGNOSIS — N93.9 ABNORMAL UTERINE BLEEDING (AUB): Primary | ICD-10-CM

## 2022-12-01 PROCEDURE — 99213 OFFICE O/P EST LOW 20 MIN: CPT | Mod: 95,,, | Performed by: OBSTETRICS & GYNECOLOGY

## 2022-12-01 PROCEDURE — 3044F HG A1C LEVEL LT 7.0%: CPT | Mod: CPTII,95,, | Performed by: OBSTETRICS & GYNECOLOGY

## 2022-12-01 PROCEDURE — 1160F PR REVIEW ALL MEDS BY PRESCRIBER/CLIN PHARMACIST DOCUMENTED: ICD-10-PCS | Mod: CPTII,95,, | Performed by: OBSTETRICS & GYNECOLOGY

## 2022-12-01 PROCEDURE — 1159F PR MEDICATION LIST DOCUMENTED IN MEDICAL RECORD: ICD-10-PCS | Mod: CPTII,95,, | Performed by: OBSTETRICS & GYNECOLOGY

## 2022-12-01 PROCEDURE — 99213 PR OFFICE/OUTPT VISIT, EST, LEVL III, 20-29 MIN: ICD-10-PCS | Mod: 95,,, | Performed by: OBSTETRICS & GYNECOLOGY

## 2022-12-01 PROCEDURE — 1160F RVW MEDS BY RX/DR IN RCRD: CPT | Mod: CPTII,95,, | Performed by: OBSTETRICS & GYNECOLOGY

## 2022-12-01 PROCEDURE — 1159F MED LIST DOCD IN RCRD: CPT | Mod: CPTII,95,, | Performed by: OBSTETRICS & GYNECOLOGY

## 2022-12-01 PROCEDURE — 3044F PR MOST RECENT HEMOGLOBIN A1C LEVEL <7.0%: ICD-10-PCS | Mod: CPTII,95,, | Performed by: OBSTETRICS & GYNECOLOGY

## 2022-12-01 NOTE — Clinical Note
Please help coordinate Hysteroscopy , D&C for AUB (Feb okay) and preop appointment to discuss details further.  thanks

## 2022-12-06 ENCOUNTER — TELEPHONE (OUTPATIENT)
Dept: OBSTETRICS AND GYNECOLOGY | Facility: CLINIC | Age: 27
End: 2022-12-06
Payer: COMMERCIAL

## 2022-12-06 NOTE — TELEPHONE ENCOUNTER
----- Message from Nayana Olivarez LPN sent at 12/1/2022  4:05 PM CST -----    ----- Message -----  From: Juan C Alvarenga MD  Sent: 12/1/2022  12:41 PM CST  To: [facility] Staff    Please help coordinate Hysteroscopy , D&C for AUB (Feb okay) and preop appointment to discuss details further.  thanks

## 2022-12-27 ENCOUNTER — LAB VISIT (OUTPATIENT)
Dept: LAB | Facility: HOSPITAL | Age: 27
End: 2022-12-27
Attending: INTERNAL MEDICINE
Payer: COMMERCIAL

## 2022-12-27 DIAGNOSIS — R35.0 URINARY FREQUENCY: Primary | ICD-10-CM

## 2022-12-27 DIAGNOSIS — R35.0 URINARY FREQUENCY: ICD-10-CM

## 2022-12-27 LAB
BILIRUB UR QL STRIP: NEGATIVE
CLARITY UR REFRACT.AUTO: CLEAR
COLOR UR AUTO: COLORLESS
GLUCOSE UR QL STRIP: NEGATIVE
HGB UR QL STRIP: NEGATIVE
KETONES UR QL STRIP: NEGATIVE
LEUKOCYTE ESTERASE UR QL STRIP: ABNORMAL
MICROSCOPIC COMMENT: NORMAL
NITRITE UR QL STRIP: NEGATIVE
PH UR STRIP: 8 [PH] (ref 5–8)
PROT UR QL STRIP: NEGATIVE
SP GR UR STRIP: 1 (ref 1–1.03)
SQUAMOUS #/AREA URNS AUTO: 3 /HPF
URN SPEC COLLECT METH UR: ABNORMAL
WBC #/AREA URNS AUTO: 2 /HPF (ref 0–5)

## 2022-12-27 PROCEDURE — 81001 URINALYSIS AUTO W/SCOPE: CPT | Performed by: INTERNAL MEDICINE

## 2022-12-27 PROCEDURE — 87086 URINE CULTURE/COLONY COUNT: CPT | Performed by: INTERNAL MEDICINE

## 2022-12-29 ENCOUNTER — PATIENT MESSAGE (OUTPATIENT)
Dept: BARIATRICS | Facility: CLINIC | Age: 27
End: 2022-12-29
Payer: COMMERCIAL

## 2022-12-29 DIAGNOSIS — N76.0 BV (BACTERIAL VAGINOSIS): Primary | ICD-10-CM

## 2022-12-29 DIAGNOSIS — B96.89 BV (BACTERIAL VAGINOSIS): Primary | ICD-10-CM

## 2022-12-29 LAB — BACTERIA UR CULT: NO GROWTH

## 2022-12-29 RX ORDER — METRONIDAZOLE 7.5 MG/G
1 GEL VAGINAL 2 TIMES DAILY
Qty: 70 G | Refills: 0 | Status: SHIPPED | OUTPATIENT
Start: 2022-12-29 | End: 2023-01-04

## 2023-01-24 ENCOUNTER — LAB VISIT (OUTPATIENT)
Dept: LAB | Facility: HOSPITAL | Age: 28
End: 2023-01-24
Attending: INTERNAL MEDICINE
Payer: COMMERCIAL

## 2023-01-24 DIAGNOSIS — E66.3 OVERWEIGHT (BMI 25.0-29.9): Primary | ICD-10-CM

## 2023-01-24 DIAGNOSIS — E66.3 OVERWEIGHT (BMI 25.0-29.9): ICD-10-CM

## 2023-01-24 DIAGNOSIS — G56.00 CARPAL TUNNEL SYNDROME, UNSPECIFIED LATERALITY: ICD-10-CM

## 2023-01-24 LAB
BASOPHILS # BLD AUTO: 0.05 K/UL (ref 0–0.2)
BASOPHILS NFR BLD: 0.9 % (ref 0–1.9)
DIFFERENTIAL METHOD: ABNORMAL
EOSINOPHIL # BLD AUTO: 0.2 K/UL (ref 0–0.5)
EOSINOPHIL NFR BLD: 3 % (ref 0–8)
ERYTHROCYTE [DISTWIDTH] IN BLOOD BY AUTOMATED COUNT: 12.4 % (ref 11.5–14.5)
HCT VFR BLD AUTO: 37.5 % (ref 37–48.5)
HGB BLD-MCNC: 11.7 G/DL (ref 12–16)
IMM GRANULOCYTES # BLD AUTO: 0.01 K/UL (ref 0–0.04)
IMM GRANULOCYTES NFR BLD AUTO: 0.2 % (ref 0–0.5)
LYMPHOCYTES # BLD AUTO: 2 K/UL (ref 1–4.8)
LYMPHOCYTES NFR BLD: 35.3 % (ref 18–48)
MCH RBC QN AUTO: 29.5 PG (ref 27–31)
MCHC RBC AUTO-ENTMCNC: 31.2 G/DL (ref 32–36)
MCV RBC AUTO: 95 FL (ref 82–98)
MONOCYTES # BLD AUTO: 0.3 K/UL (ref 0.3–1)
MONOCYTES NFR BLD: 5.9 % (ref 4–15)
NEUTROPHILS # BLD AUTO: 3.1 K/UL (ref 1.8–7.7)
NEUTROPHILS NFR BLD: 54.7 % (ref 38–73)
NRBC BLD-RTO: 0 /100 WBC
PLATELET # BLD AUTO: 327 K/UL (ref 150–450)
PMV BLD AUTO: 9.7 FL (ref 9.2–12.9)
RBC # BLD AUTO: 3.97 M/UL (ref 4–5.4)
TSH SERPL DL<=0.005 MIU/L-ACNC: 0.49 UIU/ML (ref 0.4–4)
VIT B12 SERPL-MCNC: 550 PG/ML (ref 210–950)
WBC # BLD AUTO: 5.63 K/UL (ref 3.9–12.7)

## 2023-01-24 PROCEDURE — 82607 VITAMIN B-12: CPT | Performed by: INTERNAL MEDICINE

## 2023-01-24 PROCEDURE — 85025 COMPLETE CBC W/AUTO DIFF WBC: CPT | Performed by: PHYSICIAN ASSISTANT

## 2023-01-24 PROCEDURE — 36415 COLL VENOUS BLD VENIPUNCTURE: CPT | Performed by: INTERNAL MEDICINE

## 2023-01-24 PROCEDURE — 84443 ASSAY THYROID STIM HORMONE: CPT | Performed by: INTERNAL MEDICINE

## 2023-01-27 ENCOUNTER — OFFICE VISIT (OUTPATIENT)
Dept: CARDIOLOGY | Facility: CLINIC | Age: 28
End: 2023-01-27
Payer: COMMERCIAL

## 2023-01-27 VITALS
BODY MASS INDEX: 25.63 KG/M2 | SYSTOLIC BLOOD PRESSURE: 98 MMHG | HEART RATE: 90 BPM | HEIGHT: 64 IN | DIASTOLIC BLOOD PRESSURE: 65 MMHG | OXYGEN SATURATION: 100 % | WEIGHT: 150.13 LBS

## 2023-01-27 DIAGNOSIS — I48.91 ATRIAL FIBRILLATION, UNSPECIFIED TYPE: ICD-10-CM

## 2023-01-27 DIAGNOSIS — G43.009 MIGRAINE WITHOUT AURA AND WITHOUT STATUS MIGRAINOSUS, NOT INTRACTABLE: ICD-10-CM

## 2023-01-27 DIAGNOSIS — I48.0 PAROXYSMAL ATRIAL FIBRILLATION: Primary | ICD-10-CM

## 2023-01-27 PROCEDURE — 99213 OFFICE O/P EST LOW 20 MIN: CPT | Mod: S$GLB,,,

## 2023-01-27 PROCEDURE — 99213 PR OFFICE/OUTPT VISIT, EST, LEVL III, 20-29 MIN: ICD-10-PCS | Mod: S$GLB,,,

## 2023-01-27 PROCEDURE — 1159F MED LIST DOCD IN RCRD: CPT | Mod: CPTII,S$GLB,,

## 2023-01-27 PROCEDURE — 1159F PR MEDICATION LIST DOCUMENTED IN MEDICAL RECORD: ICD-10-PCS | Mod: CPTII,S$GLB,,

## 2023-01-27 PROCEDURE — 3008F BODY MASS INDEX DOCD: CPT | Mod: CPTII,S$GLB,,

## 2023-01-27 PROCEDURE — 99999 PR PBB SHADOW E&M-EST. PATIENT-LVL IV: CPT | Mod: PBBFAC,,,

## 2023-01-27 PROCEDURE — 3074F PR MOST RECENT SYSTOLIC BLOOD PRESSURE < 130 MM HG: ICD-10-PCS | Mod: CPTII,S$GLB,,

## 2023-01-27 PROCEDURE — 3078F DIAST BP <80 MM HG: CPT | Mod: CPTII,S$GLB,,

## 2023-01-27 PROCEDURE — 99999 PR PBB SHADOW E&M-EST. PATIENT-LVL IV: ICD-10-PCS | Mod: PBBFAC,,,

## 2023-01-27 PROCEDURE — 3074F SYST BP LT 130 MM HG: CPT | Mod: CPTII,S$GLB,,

## 2023-01-27 PROCEDURE — 3008F PR BODY MASS INDEX (BMI) DOCUMENTED: ICD-10-PCS | Mod: CPTII,S$GLB,,

## 2023-01-27 PROCEDURE — 3078F PR MOST RECENT DIASTOLIC BLOOD PRESSURE < 80 MM HG: ICD-10-PCS | Mod: CPTII,S$GLB,,

## 2023-01-27 RX ORDER — PROPRANOLOL HYDROCHLORIDE 10 MG/1
20 TABLET ORAL 3 TIMES DAILY
Qty: 180 TABLET | Refills: 3 | Status: SHIPPED | OUTPATIENT
Start: 2023-01-27 | End: 2024-01-26

## 2023-01-27 NOTE — PROGRESS NOTES
Subjective:       Patient ID:  Rhea Gilman is a 27 y.o. female who presents for evaluation of AF.    27 yr old referred by PCP for AF management.Came for follow up. SBP 90-98 for months. Checks at night after taking medications. Sometimes LH. Takes propranolol twice a day and as needed.  Never had bariatric surgery. Currently taking Ozempic and was taking Wegovy. Urinating a lot. Occasional palpitations in am and improves with propranolol. Rarely feels it 1-2 times per month.  Never passed out.   No SOB. Orthopnea, PND, leg swelling.Snoring at night.   Lost 56 pounds.   Urinates a lot with medications. Drinks water and gatorade as well.     EKG  and 2017:AF with RVR, non specific ST/T wave changes.  TTE 2017 normal.     2017: Hospital admission: 22 y.o. now , who presented to the ED complaining of acute onset chest tightness, diaphoresis, SOB, and tachycardia after walking up stairs. She was found to be in atrial fibrillation with RVR. A CTA was done and was negative for PE. Labs revealed hypokalemia. In the ED she received Metoprolol 5mg x 3, put on esmolol at 150 with rates still in 140s. BP low normal - 100s/50s.  She was admitted to cardiology at [facility] and continued on esmolol. She was then transferred to [facility] [place] ICU for fetal monitoring and continued management of Afib w/RVR and started on diltiazem for rate control. Upon arrival to [facility] [place] she was seen by crictical care and cardiology. Patient found to be hypokalemic which was repleted to 3.8. Patient also started on heparin for anticoagulation given Afib. Patient spontaneously converted on Hospital Day 2.     HPI  Review of patient's allergies indicates:   Allergen Reactions    Amoxicillin Hives      Lab Results   Component Value Date     2022    K 4.3 2022     2022    CO2 27 2022    BUN 10 2022    CREATININE 0.8 2022    GLU 83 2022    HGBA1C 4.8 2022    MG 1.8 2021    AST 13  2022    ALT 8 (L) 2022    ALBUMIN 3.8 2022    PROT 6.9 2022    BILITOT 0.7 2022    WBC 5.63 2023    HGB 11.7 (L) 2023    HCT 37.5 2023    HCT 42 2020    MCV 95 2023     2023    INR 0.9 2017    TSH 0.495 2023         Lab Results   Component Value Date    CHOL 154 2021    HDL 41 2021    TRIG 62 2021       Lab Results   Component Value Date    LDLCALC 100.6 2021       Past Medical History:   Diagnosis Date    Anemia     Atrial fibrillation     converted with [facility] blockers at [facility]    Migraine headache      Past Surgical History:   Procedure Laterality Date    AUGMENTATION OF BREAST Bilateral 2022    Procedure: MAMMOPLASTY, AUGMENTATION;  Surgeon: Peterson Schumacher MD;  Location: [facility];  Service: Plastics;  Laterality: Bilateral;      Tobacco Use    Smoking status: Former     Packs/day: 0.00     Years: 0.00     Pack years: 0.00     Types: Cigarettes     Quit date: 3/18/2021     Years since quittin.8    Smokeless tobacco: Never    Tobacco comments:     1 pack 3 days   Substance and Sexual Activity    Alcohol use: No    Drug use: Not Currently    Sexual activity: Yes     Partners: Male     Birth control/protection: None      Family History   Problem Relation Age of Onset    Breast cancer Maternal Grandmother     Diabetes Mother     Kidney disease Mother     Vision loss Mother     Colon cancer Neg Hx     Ovarian cancer Neg Hx         Current Outpatient Medications:     diethylpropion (TENUATE) 75 mg SR tablet, Take 1 tablet (75 mg total) by mouth once daily., Disp: 30 tablet, Rfl: 1    norgestimate-ethinyl estradioL (ESTARYLLA) 0.25-35 mg-mcg per tablet, Take 1 tablet by mouth once daily., Disp: 84 tablet, Rfl: 3    propranoloL (INDERAL) 10 MG tablet, Take 2 tablets (20 mg total) by mouth 3 (three) times daily., Disp: 180 tablet, Rfl: 2    semaglutide (OZEMPIC) 2  "mg/dose (8 mg/3 mL) PnIj, Inject 2 mg subcutaneously once weekly, Disp: 3 mL, Rfl: 2    omeprazole (PRILOSEC) 20 MG capsule, Take 2 capsules (40 mg total) by mouth once daily., Disp: 60 capsule, Rfl: 0    semaglutide (OZEMPIC) 1 mg/dose (4 mg/3 mL), Inject 1 mg into the skin every 7 days., Disp: 3 pen, Rfl: 1  No current facility-administered medications for this visit.    Facility-Administered Medications Ordered in Other Visits:     LIDOcaine (PF) 10 mg/ml (1%) injection 10 mg, 1 mL, Intradermal, Once, Peterson Schumacher MD    mupirocin 2 % ointment, , Nasal, On Call Procedure, Peterson Schumacher MD          Review of Systems   Constitutional: Negative for decreased appetite, fever, malaise/fatigue and weight loss.   HENT:  Negative for congestion.    Eyes:  Negative for blurred vision.   Cardiovascular:  Negative for chest pain, dyspnea on exertion, orthopnea and palpitations.   Respiratory:  Negative for cough, shortness of breath and wheezing.    Endocrine: Negative for polyuria.   Musculoskeletal:  Negative for joint swelling.   Gastrointestinal:  Negative for bloating and abdominal pain.   Neurological:  Positive for dizziness.   Psychiatric/Behavioral:  Negative for altered mental status.    All other systems reviewed and are negative.     Objective:BP 98/65 (BP Location: Left arm, Patient Position: Sitting, BP Method: Medium (Automatic))   Pulse 90   Ht 5' 4" (1.626 m)   Wt 68.1 kg (150 lb 2.1 oz)   SpO2 100%   BMI 25.77 kg/m²             Physical Exam  Vitals and nursing note reviewed.   Constitutional:       Appearance: She is well-developed.   HENT:      Head: Normocephalic and atraumatic.      Mouth/Throat:      Mouth: Mucous membranes are moist.   Eyes:      Pupils: Pupils are equal, round, and reactive to light.   Cardiovascular:      Rate and Rhythm: Normal rate and regular rhythm.      Pulses: Normal pulses and intact distal pulses.      Heart sounds: No murmur heard.  Pulmonary: "      Effort: Pulmonary effort is normal. No respiratory distress.      Breath sounds: Normal breath sounds. No rales.   Abdominal:      General: Abdomen is flat. Bowel sounds are normal. There is no distension.      Palpations: Abdomen is soft.      Tenderness: There is no abdominal tenderness. There is no guarding.   Musculoskeletal:      Cervical back: Normal range of motion.      Right lower leg: No edema.      Left lower leg: No edema.   Skin:     General: Skin is warm.   Neurological:      General: No focal deficit present.      Mental Status: She is alert and oriented to person, place, and time.   Psychiatric:         Mood and Affect: Mood normal.       Assessment:       1. Paroxysmal atrial fibrillation    2. Atrial fibrillation, unspecified type         Plan:       Diagnoses and all orders for this visit:    Paroxysmal atrial fibrillation  BP fluctuates at home SBP 90-98. Recommended to stay hydrated, likely in setting of weight loss, propranolol and urinary loss for Ozempic.  Patient would like to continue propranolol as it helps her for anxiety and AF. She does not want to change her medications and want to review here BP readings at home with hydration over next week.  Informed if persistent symptoms, LH and BP drop, have to change propranolol to coreg/ lopressor and lower dose. Patient will update BP to us and inform if changes to symptoms.  TSH normal.  [id number] of 1( Female).  Rate and rhythm controlled.  TTE prior was normal.    D/W staff

## 2023-01-27 NOTE — PROGRESS NOTES
I have reviewed the notes, assessments, and/or procedures performed by Dr Padgett, I concur with her/his documentation of Rhea Hui Gilman.     May consider changing propranolol to toprol XL in the future, but she wishes to continue with propranolol due to its control of her anxiety.  Weight loss and medications (side effects of dehydration) likely responsible for low BP. Discussed hydration +/- salt intake. Will need to adjust meds if becomes symptomatic.

## 2023-02-02 ENCOUNTER — HOSPITAL ENCOUNTER (OUTPATIENT)
Dept: PREADMISSION TESTING | Facility: OTHER | Age: 28
Discharge: HOME | End: 2023-02-02
Attending: OBSTETRICS & GYNECOLOGY
Payer: COMMERCIAL

## 2023-02-02 ENCOUNTER — ANESTHESIA EVENT (OUTPATIENT)
Dept: SURGERY | Facility: OTHER | Age: 28
End: 2023-02-02
Payer: COMMERCIAL

## 2023-02-02 ENCOUNTER — OFFICE VISIT (OUTPATIENT)
Dept: OBSTETRICS AND GYNECOLOGY | Facility: CLINIC | Age: 28
End: 2023-02-02
Payer: COMMERCIAL

## 2023-02-02 VITALS
SYSTOLIC BLOOD PRESSURE: 110 MMHG | TEMPERATURE: 98 F | DIASTOLIC BLOOD PRESSURE: 59 MMHG | HEIGHT: 64 IN | OXYGEN SATURATION: 100 % | WEIGHT: 138.88 LBS | SYSTOLIC BLOOD PRESSURE: 110 MMHG | DIASTOLIC BLOOD PRESSURE: 59 MMHG | HEIGHT: 64 IN | WEIGHT: 147 LBS | HEART RATE: 93 BPM | BODY MASS INDEX: 25.1 KG/M2 | BODY MASS INDEX: 23.71 KG/M2

## 2023-02-02 DIAGNOSIS — N93.9 ABNORMAL UTERINE BLEEDING (AUB): Primary | ICD-10-CM

## 2023-02-02 PROCEDURE — 99499 NO LOS: ICD-10-PCS | Mod: S$GLB,,, | Performed by: OBSTETRICS & GYNECOLOGY

## 2023-02-02 PROCEDURE — 3078F DIAST BP <80 MM HG: CPT | Mod: CPTII,S$GLB,, | Performed by: OBSTETRICS & GYNECOLOGY

## 2023-02-02 PROCEDURE — 1160F RVW MEDS BY RX/DR IN RCRD: CPT | Mod: CPTII,S$GLB,, | Performed by: OBSTETRICS & GYNECOLOGY

## 2023-02-02 PROCEDURE — 99999 PR PBB SHADOW E&M-EST. PATIENT-LVL III: CPT | Mod: PBBFAC,,, | Performed by: OBSTETRICS & GYNECOLOGY

## 2023-02-02 PROCEDURE — 1159F MED LIST DOCD IN RCRD: CPT | Mod: CPTII,S$GLB,, | Performed by: OBSTETRICS & GYNECOLOGY

## 2023-02-02 PROCEDURE — 3078F PR MOST RECENT DIASTOLIC BLOOD PRESSURE < 80 MM HG: ICD-10-PCS | Mod: CPTII,S$GLB,, | Performed by: OBSTETRICS & GYNECOLOGY

## 2023-02-02 PROCEDURE — 3008F PR BODY MASS INDEX (BMI) DOCUMENTED: ICD-10-PCS | Mod: CPTII,S$GLB,, | Performed by: OBSTETRICS & GYNECOLOGY

## 2023-02-02 PROCEDURE — 1160F PR REVIEW ALL MEDS BY PRESCRIBER/CLIN PHARMACIST DOCUMENTED: ICD-10-PCS | Mod: CPTII,S$GLB,, | Performed by: OBSTETRICS & GYNECOLOGY

## 2023-02-02 PROCEDURE — 99499 UNLISTED E&M SERVICE: CPT | Mod: S$GLB,,, | Performed by: OBSTETRICS & GYNECOLOGY

## 2023-02-02 PROCEDURE — 1159F PR MEDICATION LIST DOCUMENTED IN MEDICAL RECORD: ICD-10-PCS | Mod: CPTII,S$GLB,, | Performed by: OBSTETRICS & GYNECOLOGY

## 2023-02-02 PROCEDURE — 3074F PR MOST RECENT SYSTOLIC BLOOD PRESSURE < 130 MM HG: ICD-10-PCS | Mod: CPTII,S$GLB,, | Performed by: OBSTETRICS & GYNECOLOGY

## 2023-02-02 PROCEDURE — 99999 PR PBB SHADOW E&M-EST. PATIENT-LVL III: ICD-10-PCS | Mod: PBBFAC,,, | Performed by: OBSTETRICS & GYNECOLOGY

## 2023-02-02 PROCEDURE — 3008F BODY MASS INDEX DOCD: CPT | Mod: CPTII,S$GLB,, | Performed by: OBSTETRICS & GYNECOLOGY

## 2023-02-02 PROCEDURE — 3074F SYST BP LT 130 MM HG: CPT | Mod: CPTII,S$GLB,, | Performed by: OBSTETRICS & GYNECOLOGY

## 2023-02-02 RX ORDER — SODIUM CHLORIDE 9 MG/ML
INJECTION, SOLUTION INTRAVENOUS CONTINUOUS
Status: CANCELLED | OUTPATIENT
Start: 2023-02-02

## 2023-02-02 RX ORDER — LIDOCAINE HYDROCHLORIDE 10 MG/ML
0.5 INJECTION, SOLUTION EPIDURAL; INFILTRATION; INTRACAUDAL; PERINEURAL ONCE
Status: CANCELLED | OUTPATIENT
Start: 2023-02-02 | End: 2023-02-02

## 2023-02-02 RX ORDER — MUPIROCIN 20 MG/G
OINTMENT TOPICAL
Status: CANCELLED | OUTPATIENT
Start: 2023-02-02

## 2023-02-02 RX ORDER — SODIUM CHLORIDE, SODIUM LACTATE, POTASSIUM CHLORIDE, CALCIUM CHLORIDE 600; 310; 30; 20 MG/100ML; MG/100ML; MG/100ML; MG/100ML
INJECTION, SOLUTION INTRAVENOUS CONTINUOUS
Status: CANCELLED | OUTPATIENT
Start: 2023-02-02

## 2023-02-02 RX ORDER — ESTRADIOL 0.03 MG/D
PATCH TRANSDERMAL
Status: ON HOLD | COMMUNITY
Start: 2022-10-26 | End: 2023-02-06

## 2023-02-02 NOTE — PROGRESS NOTES
"  Subjective:       Patient ID: Rhea Gilman is a 27 y.o. female.    Chief Complaint:  Pre-op Exam      History of Present Illness  HPI  Pt is 27 y.o. here in preop for HSC, D&C for continued AUB/? Adenomyosis.  Most recent u/s as follows:  FINDINGS:  Uterus:     Size: 7.4 x 3.8 x 4.4 cm     Masses: None     Myometrium: Heterogeneous thickening of the myometrium both anteriorly and posteriorly with alternating areas of posterior acoustic shadowing producing a "Venetian blind"  appearance.  This suggests the possibility of adenomyosis.     Endometrium: Normal in this pre menopausal patient, measuring 6 mm.     Right ovary:     Size: 3.4 x 1.5 x 3.0 cm     Appearance: Normal     Vascular flow: Normal.     Left ovary:     Size: 4.6 x 2.9 x 3.2 cm     Appearance: Cystic lesion with multiple lace-like internal septations measuring 3.1 x 2.5 x 2.5 cm.     Vascular Flow: Normal.     Free Fluid:     Small volume free fluid within the posterior cul-de-sac, likely physiologic.     Impression:     Heterogeneous thickening of the myometrium suggestive for possible adenomyosis.     Complex cystic lesion left ovary, likely representing a hemorrhagic cyst.    Also had a nl MRI after this u/s.    GYN & OB History  Patient's last menstrual period was 2023.   Date of Last Pap: No result found    OB History    Para Term  AB Living   1 1 1     1   SAB IAB Ectopic Multiple Live Births         0 1      # Outcome Date GA Lbr Carlitos/2nd Weight Sex Delivery Anes PTL Lv   1 Term 17 39w1d  2.665 kg (5 lb 14 oz) F Vag-Spont EPI  KIMI       Review of Systems  Review of Systems   Constitutional:  Negative for activity change, appetite change and fatigue.   HENT: Negative.  Negative for tinnitus.    Eyes: Negative.    Respiratory:  Negative for cough and shortness of breath.    Cardiovascular:  Negative for chest pain and palpitations.   Gastrointestinal: Negative.  Negative for abdominal pain, blood in stool, " constipation, diarrhea and nausea.   Endocrine: Negative.  Negative for hot flashes.   Genitourinary:  Positive for menstrual problem. Negative for dysmenorrhea, dyspareunia, dysuria, frequency, pelvic pain, vaginal discharge, urinary incontinence and postcoital bleeding.   Musculoskeletal:  Negative for back pain and joint swelling.   Integumentary:  Negative for rash, breast mass, nipple discharge and breast skin changes.   Neurological: Negative.  Negative for headaches.   Hematological: Negative.  Does not bruise/bleed easily.   Psychiatric/Behavioral:  The patient is not nervous/anxious.    Breast: negative.  Negative for mass, nipple discharge and skin changes        Objective:    Physical Exam:   Constitutional: She is oriented to person, place, and time. She appears well-developed and well-nourished. No distress.    HENT:   Head: Normocephalic and atraumatic.    Eyes: Pupils are equal, round, and reactive to light. Conjunctivae and lids are normal.     Cardiovascular:  Normal rate.      Exam reveals no edema.        Pulmonary/Chest: Effort normal.        Abdominal: Soft. Bowel sounds are normal. She exhibits no distension. There is no abdominal tenderness. There is no rebound and no guarding.             Musculoskeletal: Normal range of motion and moves all extremeties.       Neurological: She is alert and oriented to person, place, and time. She has normal strength.    Skin: Skin is warm and dry.    Psychiatric: She has a normal mood and affect. Her speech is normal and behavior is normal. Thought content normal. Her mood appears not anxious. She does not exhibit a depressed mood. She expresses no suicidal plans and no homicidal plans.        Assessment:        1. Abnormal uterine bleeding (AUB)                Plan:      Rhea was seen today for pre-op exam.    Diagnoses and all orders for this visit:    Abnormal uterine bleeding (AUB)  -     Full code; Standing  -     Vital signs; Standing  -     Insert  peripheral IV; Standing  -     Chlorhexidine (CHG) 2% Wipes; Standing  -     Notify Physician/Vital Signs Parameters Urine output less than 0.5mL/kg/hr (with indwelling catheter) or 30 mL/hr (without indwelling catheter) or blood glucose greater than 200 mg/dL; Standing  -     Notify physician; Standing  -     Notify Physician - Potential Need of Opioid Reversal; Standing  -     Diet NPO; Standing  -     Chlorohexidine Gluconate Bath; Standing  -     Place in Outpatient; Standing  -     Place sequential compression device; Standing  -     Pregnancy, urine rapid; Standing    Other orders  -     0.9%  NaCl infusion  -     IP VTE LOW RISK PATIENT; Standing  -     mupirocin 2 % ointment  -     Ambulate; Standing      Factual information regarding the medical condition and the need for HSC D&C was discussed with the patient, who verbalized understanding. The therapeutic rationale, benefits, risks and potential complications were discussed, including the possibility of pain, bleeding, infection, loss of function, disfigurement, death or other unforeseen complications. Alternatives to the procedure were discussed (including potential risks, complications and benefits of each). No guarantee was expressed or implied as to the results of the procedure. Informed consent was given, as well as a request to proceed.

## 2023-02-02 NOTE — ANESTHESIA PREPROCEDURE EVALUATION
02/02/2023  Rhea Gilman is a 27 y.o., female.      Pre-op Assessment    I have reviewed the Patient Summary Reports.     I have reviewed the Nursing Notes. I have reviewed the NPO Status.   I have reviewed the Medications.     Review of Systems  Anesthesia Hx:  Denies Family Hx of Anesthesia complications.   Denies Personal Hx of Anesthesia complications.   Social:  Former Smoker    Hematology/Oncology:     Oncology Normal    -- Anemia (hb 11.7):   EENT/Dental:EENT/Dental Normal   Cardiovascular:   Dysrhythmias (single episode during pregnancy, not recurred, no anticoag tx rec) atrial fibrillation    Pulmonary:  Pulmonary Normal    Renal/:  Renal/ Normal     Hepatic/GI:   GERD (prn rx)    Musculoskeletal:  Musculoskeletal Normal    Neurological:   Headaches (inderal)    Endocrine:  Endocrine Normal    Dermatological:  Skin Normal    Psych:   anxiety          Physical Exam  General: Well nourished, Cooperative, Alert and Oriented    Airway:  Mallampati: II   Mouth Opening: Normal  TM Distance: Normal  Neck ROM: Normal ROM    Dental:  Intact        Anesthesia Plan  Type of Anesthesia, risks & benefits discussed:    Anesthesia Type: Gen Supraglottic Airway  Intra-op Monitoring Plan: Standard ASA Monitors  Post Op Pain Control Plan: multimodal analgesia and IV/PO Opioids PRN  Induction:  IV  Informed Consent: Informed consent signed with the Patient and all parties understand the risks and agree with anesthesia plan.  All questions answered.   ASA Score: 2  Anesthesia Plan Notes: Mild anemia, hb 11.7    Ready For Surgery From Anesthesia Perspective.     .

## 2023-02-02 NOTE — DISCHARGE INSTRUCTIONS
Information to Prepare you for your Surgery    PRE-ADMIT TESTING -  [phone]    [address] [facility]          Your surgery has been scheduled at [facility]. We are pleased to have the opportunity to serve you. For Further Information please call [phone].    On the day of surgery please report to the Information Desk on the 1st floor.    CONTACT YOUR PHYSICIAN'S OFFICE THE DAY PRIOR TO YOUR SURGERY TO OBTAIN YOUR ARRIVAL TIME.     The evening before surgery do not eat anything after 9 p.m. ( this includes hard candy, chewing gum and mints).  You may only have GATORADE, POWERADE AND WATER  from 9 p.m. until you leave your home.   DO NOT DRINK ANY LIQUIDS ON THE WAY TO THE HOSPITAL.      Why does your anesthesiologist allow you to drink Gatorade/Powerade before surgery?  Gatorade/Powerade helps to increase your comfort before surgery and to decrease your nausea after surgery. The carbohydrates in Gatorade/Powerade help reduce your body's stress response to surgery.  If you are a diabetic-drink only water prior to surgery.      Current Visitor policy(12/27/2021) - Patients may have 2 visitors pre and post procedure. Only 2 visitors will be allowed in the Surgical building with the patient. No one under the age of 12 will be allowed into the facility.    SPECIAL MEDICATION INSTRUCTIONS: TAKE medications checked off by the Anesthesiologist on your Medication List.    Angiogram Patients: Take medications as instructed by your physician, including aspirin.     Surgery Patients:    If you take ASPIRIN - Your PHYSICIAN/SURGEON will need to inform you IF/OR when you need to stop taking aspirin prior to your surgery.     The week prior to surgery do not ot take any medications containing IBUPROFEN or NSAIDS ( Advil, Motrin, Goodys, BC, Aleve, Naproxen etc) If you are not sure if you should take a medicine please call your surgeon's office.  Ok to take  Tylenol    Do Not Wear any make-up (especially eye make-up) to surgery. Please remove any false eyelashes or eyelash extensions. If you arrive the day of surgery with makeup/eyelashes on you will be required to remove prior to surgery. (There is a risk of corneal abrasions if eye makeup/eyelash extensions are not removed)      Leave all valuables at home.   Do Not wear any jewelry or watches, including any metal in body piercings. Jewelry must be removed prior to coming to the hospital.  There is a possibility that rings that are unable to be removed may be cut off if they are on the surgical extremity.    Please remove all hair extensions, wigs, clips and any other metal accessories/ ornaments from your hair.  These items may pose a flammable/fire risk in Surgery and must be removed.    Do not shave your surgical area at least 5 days prior to your surgery. The surgical prep will be performed at the hospital according to Infection Control regulations.    Contact Lens must be removed before surgery. Either do not wear the contact lens or bring a case and solution for storage.  Please bring a container for eyeglasses or dentures as required.  Bring any paperwork your physician has provided, such as consent forms,  history and physicals, doctor's orders, etc.   Bring comfortable clothes that are loose fitting to wear upon discharge. Take into consideration the type of surgery being performed.  Maintain your diet as advised per your physician the day prior to surgery.      Adequate rest the night before surgery is advised.   Park in the Parking lot behind the hospital or in the [facility] across the street from the parking lot. Parking is complimentary.  If you will be discharged the same day as your procedure, please arrange for a responsible adult to drive you home or to accompany you if traveling by taxi.   YOU WILL NOT BE PERMITTED TO DRIVE OR TO LEAVE THE HOSPITAL ALONE AFTER SURGERY.   If you are  being discharged the same day, it is strongly recommended that you arrange for someone to remain with you for the first 24 hrs following your surgery.    The Surgeon will speak to your family/visitor after your surgery regarding the outcome of your surgery and post op care.  The Surgeon may speak to you after your surgery, but there is a possibility you may not remember the details.  Please check with your family members regarding the conversation with the Surgeon.    We strongly recommend whoever is bringing you home be present for discharge instructions.  This will ensure a thorough understanding for your post op home care.    ALL CHILDREN MUST ALWAYS BE ACCOMPANIED BY AN ADULT.    Visitors-Refer to current Visitor policy handouts.    Thank you for your cooperation.  The Staff of [facility].            Bathing Instructions with Hibiclens    Shower the evening before and morning of your procedure with Chlorhexidine (Hibiclens)  do not use Chlorhexidine on your face or genitals. Do not get in your eyes.  Wash your face with water and your regular face wash/soap  Use your regular shampoo  Apply Chlorhexidine (Hibiclens) directly on your skin or on a wet washcloth and wash gently. When showering: Move away from the shower stream when applying Chlorhexidine (Hibiclens) to avoid rinsing off too soon.  Rinse thoroughly with warm water  Do not dilute Chlorhexidine (Hibiclens)   Dry off as usual, do not use any deodorant, powder, body lotions, perfume, after shave or cologne.

## 2023-02-03 ENCOUNTER — TELEPHONE (OUTPATIENT)
Dept: OBSTETRICS AND GYNECOLOGY | Facility: CLINIC | Age: 28
End: 2023-02-03
Payer: COMMERCIAL

## 2023-02-03 NOTE — TELEPHONE ENCOUNTER
Patient was instructed to report at 10:00am on Monday 2/3/23 at the [facility],verbalized understanding.

## 2023-02-06 ENCOUNTER — ANESTHESIA (OUTPATIENT)
Dept: SURGERY | Facility: OTHER | Age: 28
End: 2023-02-06
Payer: COMMERCIAL

## 2023-02-06 ENCOUNTER — HOSPITAL ENCOUNTER (OUTPATIENT)
Facility: OTHER | Age: 28
Discharge: HOME | End: 2023-02-06
Attending: OBSTETRICS & GYNECOLOGY | Admitting: OBSTETRICS & GYNECOLOGY
Payer: COMMERCIAL

## 2023-02-06 DIAGNOSIS — Z98.890 S/P DILATION AND CURETTAGE: Primary | ICD-10-CM

## 2023-02-06 DIAGNOSIS — N93.9 ABNORMAL UTERINE BLEEDING (AUB): ICD-10-CM

## 2023-02-06 LAB
B-HCG UR QL: NEGATIVE
CTP QC/QA: YES

## 2023-02-06 PROCEDURE — 63600175 PHARM REV CODE 636 W HCPCS: Performed by: NURSE ANESTHETIST, CERTIFIED REGISTERED

## 2023-02-06 PROCEDURE — 36000706: Performed by: OBSTETRICS & GYNECOLOGY

## 2023-02-06 PROCEDURE — 27201423 OPTIME MED/SURG SUP & DEVICES STERILE SUPPLY: Performed by: OBSTETRICS & GYNECOLOGY

## 2023-02-06 PROCEDURE — 88305 TISSUE EXAM BY PATHOLOGIST: CPT | Performed by: PATHOLOGY

## 2023-02-06 PROCEDURE — 36000707: Performed by: OBSTETRICS & GYNECOLOGY

## 2023-02-06 PROCEDURE — 25000003 PHARM REV CODE 250: Performed by: OBSTETRICS & GYNECOLOGY

## 2023-02-06 PROCEDURE — 58558 HYSTEROSCOPY BIOPSY: CPT | Mod: ,,, | Performed by: OBSTETRICS & GYNECOLOGY

## 2023-02-06 PROCEDURE — 88305 TISSUE EXAM BY PATHOLOGIST: CPT | Mod: 26,,, | Performed by: PATHOLOGY

## 2023-02-06 PROCEDURE — 37000009 HC ANESTHESIA EA ADD 15 MINS: Performed by: OBSTETRICS & GYNECOLOGY

## 2023-02-06 PROCEDURE — 71000033 HC RECOVERY, INTIAL HOUR: Performed by: OBSTETRICS & GYNECOLOGY

## 2023-02-06 PROCEDURE — 25000003 PHARM REV CODE 250: Performed by: NURSE ANESTHETIST, CERTIFIED REGISTERED

## 2023-02-06 PROCEDURE — 58558 PR HYSTEROSCOPY,W/ENDO BX: ICD-10-PCS | Mod: ,,, | Performed by: OBSTETRICS & GYNECOLOGY

## 2023-02-06 PROCEDURE — 81025 URINE PREGNANCY TEST: CPT | Performed by: ANESTHESIOLOGY

## 2023-02-06 PROCEDURE — 71000015 HC POSTOP RECOV 1ST HR: Performed by: OBSTETRICS & GYNECOLOGY

## 2023-02-06 PROCEDURE — 71000039 HC RECOVERY, EACH ADD'L HOUR: Performed by: OBSTETRICS & GYNECOLOGY

## 2023-02-06 PROCEDURE — 25000003 PHARM REV CODE 250: Performed by: ANESTHESIOLOGY

## 2023-02-06 PROCEDURE — 37000008 HC ANESTHESIA 1ST 15 MINUTES: Performed by: OBSTETRICS & GYNECOLOGY

## 2023-02-06 PROCEDURE — 88305 TISSUE EXAM BY PATHOLOGIST: ICD-10-PCS | Mod: 26,,, | Performed by: PATHOLOGY

## 2023-02-06 PROCEDURE — 63600175 PHARM REV CODE 636 W HCPCS: Performed by: ANESTHESIOLOGY

## 2023-02-06 RX ORDER — OXYCODONE HYDROCHLORIDE 5 MG/1
5 TABLET ORAL
Status: DISCONTINUED | OUTPATIENT
Start: 2023-02-06 | End: 2023-02-06 | Stop reason: HOSPADM

## 2023-02-06 RX ORDER — SODIUM CHLORIDE 9 MG/ML
INJECTION, SOLUTION INTRAVENOUS CONTINUOUS
Status: DISCONTINUED | OUTPATIENT
Start: 2023-02-06 | End: 2023-02-06 | Stop reason: HOSPADM

## 2023-02-06 RX ORDER — DIPHENHYDRAMINE HCL 25 MG
25 CAPSULE ORAL EVERY 4 HOURS PRN
Status: DISCONTINUED | OUTPATIENT
Start: 2023-02-06 | End: 2023-02-06 | Stop reason: HOSPADM

## 2023-02-06 RX ORDER — SODIUM CHLORIDE 0.9 % (FLUSH) 0.9 %
3 SYRINGE (ML) INJECTION
Status: DISCONTINUED | OUTPATIENT
Start: 2023-02-06 | End: 2023-02-06 | Stop reason: HOSPADM

## 2023-02-06 RX ORDER — MUPIROCIN 20 MG/G
OINTMENT TOPICAL
Status: DISCONTINUED | OUTPATIENT
Start: 2023-02-06 | End: 2023-02-06 | Stop reason: HOSPADM

## 2023-02-06 RX ORDER — MEPERIDINE HYDROCHLORIDE 25 MG/ML
12.5 INJECTION INTRAMUSCULAR; INTRAVENOUS; SUBCUTANEOUS ONCE AS NEEDED
Status: DISCONTINUED | OUTPATIENT
Start: 2023-02-06 | End: 2023-02-06 | Stop reason: HOSPADM

## 2023-02-06 RX ORDER — PROCHLORPERAZINE EDISYLATE 5 MG/ML
5 INJECTION INTRAMUSCULAR; INTRAVENOUS EVERY 30 MIN PRN
Status: DISCONTINUED | OUTPATIENT
Start: 2023-02-06 | End: 2023-02-06 | Stop reason: HOSPADM

## 2023-02-06 RX ORDER — DEXAMETHASONE SODIUM PHOSPHATE 4 MG/ML
INJECTION, SOLUTION INTRA-ARTICULAR; INTRALESIONAL; INTRAMUSCULAR; INTRAVENOUS; SOFT TISSUE
Status: DISCONTINUED | OUTPATIENT
Start: 2023-02-06 | End: 2023-02-06

## 2023-02-06 RX ORDER — PROCHLORPERAZINE EDISYLATE 5 MG/ML
5 INJECTION INTRAMUSCULAR; INTRAVENOUS EVERY 6 HOURS PRN
Status: DISCONTINUED | OUTPATIENT
Start: 2023-02-06 | End: 2023-02-06 | Stop reason: HOSPADM

## 2023-02-06 RX ORDER — SILVER NITRATE 38.21; 12.74 MG/1; MG/1
STICK TOPICAL
Status: DISCONTINUED | OUTPATIENT
Start: 2023-02-06 | End: 2023-02-06 | Stop reason: HOSPADM

## 2023-02-06 RX ORDER — LIDOCAINE HCL/PF 100 MG/5ML
SYRINGE (ML) INTRAVENOUS
Status: DISCONTINUED | OUTPATIENT
Start: 2023-02-06 | End: 2023-02-06

## 2023-02-06 RX ORDER — DIPHENHYDRAMINE HYDROCHLORIDE 50 MG/ML
25 INJECTION INTRAMUSCULAR; INTRAVENOUS EVERY 4 HOURS PRN
Status: DISCONTINUED | OUTPATIENT
Start: 2023-02-06 | End: 2023-02-06 | Stop reason: HOSPADM

## 2023-02-06 RX ORDER — MIDAZOLAM HYDROCHLORIDE 1 MG/ML
INJECTION INTRAMUSCULAR; INTRAVENOUS
Status: DISCONTINUED | OUTPATIENT
Start: 2023-02-06 | End: 2023-02-06

## 2023-02-06 RX ORDER — LIDOCAINE HYDROCHLORIDE 10 MG/ML
0.5 INJECTION, SOLUTION EPIDURAL; INFILTRATION; INTRACAUDAL; PERINEURAL ONCE
Status: DISCONTINUED | OUTPATIENT
Start: 2023-02-06 | End: 2023-02-06 | Stop reason: HOSPADM

## 2023-02-06 RX ORDER — HYDROMORPHONE HYDROCHLORIDE 2 MG/ML
0.4 INJECTION, SOLUTION INTRAMUSCULAR; INTRAVENOUS; SUBCUTANEOUS EVERY 5 MIN PRN
Status: DISCONTINUED | OUTPATIENT
Start: 2023-02-06 | End: 2023-02-06 | Stop reason: HOSPADM

## 2023-02-06 RX ORDER — FENTANYL CITRATE 50 UG/ML
INJECTION, SOLUTION INTRAMUSCULAR; INTRAVENOUS
Status: DISCONTINUED | OUTPATIENT
Start: 2023-02-06 | End: 2023-02-06

## 2023-02-06 RX ORDER — KETOROLAC TROMETHAMINE 30 MG/ML
INJECTION, SOLUTION INTRAMUSCULAR; INTRAVENOUS
Status: DISCONTINUED | OUTPATIENT
Start: 2023-02-06 | End: 2023-02-06

## 2023-02-06 RX ORDER — PROPOFOL 10 MG/ML
INJECTION, EMULSION INTRAVENOUS
Status: DISCONTINUED | OUTPATIENT
Start: 2023-02-06 | End: 2023-02-06

## 2023-02-06 RX ORDER — PHENYLEPHRINE HYDROCHLORIDE 10 MG/ML
INJECTION INTRAVENOUS
Status: DISCONTINUED | OUTPATIENT
Start: 2023-02-06 | End: 2023-02-06

## 2023-02-06 RX ORDER — HYDROCODONE BITARTRATE AND ACETAMINOPHEN 5; 325 MG/1; MG/1
1 TABLET ORAL EVERY 4 HOURS PRN
Qty: 7 TABLET | Refills: 0 | Status: SHIPPED | OUTPATIENT
Start: 2023-02-06 | End: 2023-02-13

## 2023-02-06 RX ORDER — IBUPROFEN 800 MG/1
800 TABLET ORAL EVERY 8 HOURS PRN
Qty: 30 TABLET | Refills: 0 | Status: SHIPPED | OUTPATIENT
Start: 2023-02-06 | End: 2023-02-13

## 2023-02-06 RX ORDER — ONDANSETRON 2 MG/ML
INJECTION INTRAMUSCULAR; INTRAVENOUS
Status: DISCONTINUED | OUTPATIENT
Start: 2023-02-06 | End: 2023-02-06

## 2023-02-06 RX ORDER — HYDROCODONE BITARTRATE AND ACETAMINOPHEN 5; 325 MG/1; MG/1
1 TABLET ORAL EVERY 4 HOURS PRN
Status: DISCONTINUED | OUTPATIENT
Start: 2023-02-06 | End: 2023-02-06 | Stop reason: HOSPADM

## 2023-02-06 RX ORDER — SODIUM CHLORIDE, SODIUM LACTATE, POTASSIUM CHLORIDE, CALCIUM CHLORIDE 600; 310; 30; 20 MG/100ML; MG/100ML; MG/100ML; MG/100ML
INJECTION, SOLUTION INTRAVENOUS CONTINUOUS
Status: DISCONTINUED | OUTPATIENT
Start: 2023-02-06 | End: 2023-02-06 | Stop reason: HOSPADM

## 2023-02-06 RX ORDER — ONDANSETRON 8 MG/1
8 TABLET, ORALLY DISINTEGRATING ORAL EVERY 8 HOURS PRN
Status: DISCONTINUED | OUTPATIENT
Start: 2023-02-06 | End: 2023-02-06 | Stop reason: HOSPADM

## 2023-02-06 RX ADMIN — ONDANSETRON HYDROCHLORIDE 8 MG: 2 INJECTION INTRAMUSCULAR; INTRAVENOUS at 11:02

## 2023-02-06 RX ADMIN — KETOROLAC TROMETHAMINE 30 MG: 30 INJECTION, SOLUTION INTRAMUSCULAR; INTRAVENOUS at 12:02

## 2023-02-06 RX ADMIN — FENTANYL CITRATE 100 MCG: 50 INJECTION, SOLUTION INTRAMUSCULAR; INTRAVENOUS at 11:02

## 2023-02-06 RX ADMIN — MUPIROCIN: 20 OINTMENT TOPICAL at 10:02

## 2023-02-06 RX ADMIN — DEXAMETHASONE SODIUM PHOSPHATE 8 MG: 4 INJECTION, SOLUTION INTRAMUSCULAR; INTRAVENOUS at 11:02

## 2023-02-06 RX ADMIN — OXYCODONE 5 MG: 5 TABLET ORAL at 12:02

## 2023-02-06 RX ADMIN — PHENYLEPHRINE HYDROCHLORIDE 100 MCG: 10 INJECTION INTRAVENOUS at 11:02

## 2023-02-06 RX ADMIN — CARBOXYMETHYLCELLULOSE SODIUM 2 DROP: 2.5 SOLUTION/ DROPS OPHTHALMIC at 11:02

## 2023-02-06 RX ADMIN — SODIUM CHLORIDE, SODIUM LACTATE, POTASSIUM CHLORIDE, AND CALCIUM CHLORIDE: 600; 310; 30; 20 INJECTION, SOLUTION INTRAVENOUS at 10:02

## 2023-02-06 RX ADMIN — PROPOFOL 50 MG: 10 INJECTION, EMULSION INTRAVENOUS at 11:02

## 2023-02-06 RX ADMIN — LIDOCAINE HYDROCHLORIDE 50 MG: 20 INJECTION, SOLUTION INTRAVENOUS at 11:02

## 2023-02-06 RX ADMIN — PROPOFOL 30 MG: 10 INJECTION, EMULSION INTRAVENOUS at 12:02

## 2023-02-06 RX ADMIN — MIDAZOLAM HYDROCHLORIDE 2 MG: 1 INJECTION, SOLUTION INTRAMUSCULAR; INTRAVENOUS at 11:02

## 2023-02-06 RX ADMIN — PROPOFOL 150 MG: 10 INJECTION, EMULSION INTRAVENOUS at 11:02

## 2023-02-06 NOTE — DISCHARGE INSTRUCTIONS
Home Care Instruction D&C Hysteroscopy             ACTIVITY LEVEL:  If you received sedation and/or an anesthetic, you may feel sleepy for several hours. Rest until you feel more  awake. Gradually resume your normal activities.    DIET:  At home, continue with liquids. If there is no nausea, you may eat a soft diet and gradually resume a regular diet.    BATHING:  You may shower  as desired in one day.  You should avoid tub baths, hot tubs and swimming pools until seen by your physician for a post-op follow up.    CARE:  You can expect watery or bloody vaginal discharge for several days. Do not use tampons, please only use pads. Sexual activity is restricted as advised by your doctor.    MEDICATIONS:  You will receive instructions for any pain and/or antibiotic prescriptions. Pain medication should be taken only if needed and as directed. Antibiotics, if ordered, should be taken as directed until the entire prescription is completed.    ADDITIONAL INFORMATION:  __________________________________________________________________________________________  WHEN TO CALL THE DOCTOR:   For any heavy vaginal bleeding   Fever over 101°F (38.4°C)   Any lower abdominal pain not relieved by the pain mediation  RETURN APPOINTMENT:  __________________________________________________________________________________________  FOR EMERGENCIES:  If any unusual problems or difficulties occur, contact  __________________ or the resident at [phone] (page ) or the Clinic office, [phone].

## 2023-02-06 NOTE — OP NOTE
OPERATIVE REPORT FOR HYSTEROSCOPY WITH DIRECTED BIOPSY, DILATION AND CURETTAGE                                                                                                                                Date of Procedure: 02/06/2023                                                                                 SURGEON:  Juan C Alvarenga M.D.                                                                                                                    ASSISTANT:  Ar Leach MD (RES)                                                                                              PREOPERATIVE DIAGNOSIS:         AUB                                                                               POSTOPERATIVE DIAGNOSIS:     AUB, endometrial polyp                                                                               PROCEDURE:  Diagnostic Hysteroscopy, directed biopsy, dilation and curettage                                                                                                                       ANESTHESIA:  LMA    BLOOD LOSS:  5 mL.                                                                                                                                      URINE OUTPUT:  100 cc                                                                                       IV FLUIDS:  500 cc    FLUID DEFICIT:  200 cc    COMPLICATIONS:  None    SPECIMINES:  1) Endometrial curettage      FINDINGS:  1) Normal tubal ostia bilaterally  normal uterine cavity  normal cervical canal  Small polyp near right tubal ostia region      STATEMENT OF MEDICAL NECESSITY:  Pt is a 27 y.o. year old female who has AUB unresponsive to medical tx.  After discussing risks/benefits/alternatives/and indications, pt wished to proceed with office hysteroscopy and directed biopsy.                                                                               PROCEDURE IN DETAIL:  After appropriate consents had been obtained  and time out performed, the patient was taken to the operating room at which time general anesthesia was administered.  The patient was then placed in the lithotomy position, prepped in the appropriate sterile fashion for a vaginal procedure.  The bladder was decompressed using an in/out catheter.  A sterile speculum was put in place.  A single tooth tenaculum was placed on the anterior lip of the cervix.  The uterine cavity sounded to approximately 9 cm.  The cervix was dilated to 15 Sami.  The hysteroscope was placed through the cervix and was allowed to hydrodilate.  Once the endometrial cavity was entered, a thorough inspection was performed with the above stated findings.  Using the curette, directed biopsies were performed where there was felt to be pathology that may explain her abnormal bleeding.  A sharp curettage was then performed to remove any residual tissue and restore a normal lining.  All tissue was sent to pathology.  After confirming hemostasis, the hysteroscope was removed.  The tenaculum and speculum were then removed.  The patient was then extubated and taken to recovery in stable condition.    All counts were correct x 2 at the end of the procedure.  Minimal bleeding was noted.  Antibiotics were not indicated for this case.      I was scrubbed and provided direct supervision for the entire procedure.    Thanks,     Juan C Alvarenga MD

## 2023-02-06 NOTE — INTERVAL H&P NOTE
The patient has been examined and the H&P has been reviewed:    I concur with the findings and no changes have occurred since H&P was written.    Surgery risks, benefits and alternative options discussed and understood by patient/family.      Plan for hysteroscopy/D&C and possible removal of intra-uterine pathology pending findings. All questions answered.     Ana Yip M.D.  OB/GYN PGY-4    Active Hospital Problems    Diagnosis  POA    *Abnormal uterine bleeding (AUB) [N93.9]  Unknown      Resolved Hospital Problems   No resolved problems to display.

## 2023-02-06 NOTE — ANESTHESIA PROCEDURE NOTES
Intubation    Date/Time: 2/6/2023 11:42 AM  Performed by: Jeanie Arzate CRNA  Authorized by: Fortunato Hendrickson MD     Intubation:     Induction:  Intravenous    Intubated:  Postinduction    Mask Ventilation:  Easy mask    Attempts:  1    Attempted By:  CRNA    Difficult Airway Encountered?: No      Complications:  None    Airway Device:  Supraglottic airway/LMA    Airway Device Size:  4.0    Style/Cuff Inflation:  Uncuffed    Secured at:  The lips    Placement Verified By:  Capnometry    Complicating Factors:  None    Findings Post-Intubation:  Atraumatic/condition of teeth unchanged

## 2023-02-06 NOTE — TRANSFER OF CARE
Anesthesia Transfer of Care Note    Patient: Rhea Gilman    Procedure(s) Performed: Procedure(s) (LRB):  HYSTEROSCOPY, WITH DILATION AND CURETTAGE OF UTERUS (N/A)    Patient location: PACU    Anesthesia Type: general    Transport from OR: Transported from OR on 2-3 L/min O2 by NC with adequate spontaneous ventilation    Post pain: adequate analgesia    Post assessment: no apparent anesthetic complications    Post vital signs: stable    Level of consciousness: awake    Nausea/Vomiting: no nausea/vomiting    Complications: none    Transfer of care protocol was followed      Last vitals:   Visit Vitals  /63 (BP Location: Left arm, Patient Position: Sitting)   Pulse 77   Temp 36.7 °C (98.1 °F) (Oral)   Resp 18   LMP 01/16/2023   SpO2 99%   Breastfeeding No

## 2023-02-06 NOTE — ANESTHESIA POSTPROCEDURE EVALUATION
Anesthesia Post Evaluation    Patient: Rhea Gilman    Procedure(s) Performed: Procedure(s) (LRB):  HYSTEROSCOPY, WITH DILATION AND CURETTAGE OF UTERUS (N/A)    Final Anesthesia Type: general      Patient location during evaluation: PACU  Patient participation: Yes- Able to Participate  Level of consciousness: awake and alert  Post-procedure vital signs: reviewed and stable  Pain management: adequate  Airway patency: patent    PONV status at discharge: No PONV  Anesthetic complications: no      Cardiovascular status: blood pressure returned to baseline  Respiratory status: spontaneous ventilation  Hydration status: euvolemic  Follow-up not needed.          Vitals Value Taken Time   /64 02/06/23 1302   Temp 36.7 °C (98.1 °F) 02/06/23 1215   Pulse 79 02/06/23 1311   Resp 18 02/06/23 1245   SpO2 100 % 02/06/23 1311   Vitals shown include unvalidated device data.      No case tracking events are documented in the log.      Pain/Lizette Score: Pain Rating Prior to Med Admin: 4 (2/6/2023 12:34 PM)  Lizette Score: 10 (2/6/2023 12:45 PM)

## 2023-02-06 NOTE — PLAN OF CARE
Rhea Hui Gilman has met all discharge criteria from Phase II. Vital Signs are stable, ambulating  without difficulty. Discharge instructions given, patient verbalized understanding. Discharged from facility via wheelchair in stable condition.

## 2023-02-06 NOTE — DISCHARGE SUMMARY
[facility]  Brief Op Note  Short Stay    Admit Date: 2/6/2023    Attending Physician: Olayinka Alvarenga MD     Surgery Date: 2/6/2023     Surgeon(s) and Role:     * Olayinka Alvarenga MD - Primary    Assisting Physician: Lul Leach MD PGY1    Pre-op Diagnosis:  Abnormal uterine bleeding (AUB) [N93.9]    Post-op Diagnosis:  Post-Op Diagnosis Codes:     * Abnormal uterine bleeding (AUB) [N93.9]    Procedure(s) (LRB):  HYSTEROSCOPY, WITH DILATION AND CURETTAGE OF UTERUS (N/A)    Anesthesia: Choice    Findings/Key Components:       1) Normal tubal ostia bilaterally  normal uterine cavity  normal cervical canal  Small polyp near right tubal ostia region    Estimated Blood Loss: 5 cc         Specimens:   Specimen (24h ago, onward)       Start     Ordered    02/06/23 1159  Specimen to Pathology, Surgery Gynecology and Obstetrics  Once        Comments: Pre-op Diagnosis: Abnormal uterine bleeding (AUB) [N93.9]Procedure(s):HYSTEROSCOPY, WITH DILATION AND CURETTAGE OF UTERUS Number of specimens: 1Name of specimens: 1. Endometrial curettage     References:    Click here for ordering Quick Tip   Question Answer Comment   Procedure Type: Gynecology and Obstetrics    Specimen Class: Routine/Screening    Which provider would you like to cc? OLAYINKA ALVARENGA    Release to patient Immediate        02/06/23 1201                        Discharge Note    Discharge Date: 02/06/2023    Discharge Provider: Lul Leach    Diagnoses:  Active Hospital Problems    Diagnosis  POA    *S/P dilation and curettage [Z98.890]  Not Applicable    Abnormal uterine bleeding (AUB) [N93.9]  Unknown      Resolved Hospital Problems   No resolved problems to display.       Discharged Condition: good    Hospital Course:   Patient was admitted for outpatient procedure as above, and tolerated the procedure well with no complications. Please see operative report for further details. Following the procedure, the patient was awakened from anesthesia  and transferred to the recovery area in stable condition. She was discharged to home once ambulating, voiding, tolerating PO intake, and pain was well-controlled. Patient was given routine post-op instructions and prescriptions for pain medication to take as needed. Patient instructed to follow up with Dr. Alvarenga in 4 weeks.    Final Diagnoses: Same as principal problem.    Disposition: Home or Self Care    Follow up/Patient Instructions:    Medications:  Reconciled Home Medications:      Medication List        START taking these medications      HYDROcodone-acetaminophen 5-325 mg per tablet  Commonly known as: NORCO  Take 1 tablet by mouth every 4 (four) hours as needed for Pain.     ibuprofen 800 MG tablet  Commonly known as: ADVIL,MOTRIN  Take 1 tablet (800 mg total) by mouth every 8 (eight) hours as needed for Pain (Take scheduled for next 3-5 days; take as needed afterwards).            CHANGE how you take these medications      propranoloL 10 MG tablet  Commonly known as: INDERAL  Take 2 tablets (20 mg total) by mouth 3 (three) times daily.  What changed:   when to take this  reasons to take this            CONTINUE taking these medications      diethylpropion 75 mg SR tablet  Commonly known as: TENUATE  Take 1 tablet (75 mg total) by mouth once daily.     omeprazole 20 MG capsule  Commonly known as: PRILOSEC  Take 2 capsules (40 mg total) by mouth once daily.            STOP taking these medications      estradiol 0.025 mg/24 hr 0.025 mg/24 hr            Discharge Procedure Orders   Diet general     Pelvic Rest     Call MD for:  temperature >100.4     Call MD for:  persistent nausea and vomiting     Call MD for:  severe uncontrolled pain     Call MD for:  difficulty breathing, headache or visual disturbances     Call MD for:  redness, tenderness, or signs of infection (pain, swelling, redness, odor or green/yellow discharge around incision site)     Call MD for:  hives     Call MD for:  persistent dizziness or  light-headedness     Call MD for:  extreme fatigue     Call MD for:   Order Comments: Heavy vaginal bleeding saturating more than 1 pad an hour for more than 1 hour     Activity as tolerated      Follow-up Information       Juan C Alvarenga MD. Schedule an appointment as soon as possible for a visit in 4 week(s).    Specialties: Obstetrics, Obstetrics and Gynecology  Why: For post op visit  Contact information:  [address] [place] [id number] [phone]                          Ar Leach MD PGY-1  Obstetrics and Gynecology

## 2023-02-07 VITALS
RESPIRATION RATE: 18 BRPM | OXYGEN SATURATION: 100 % | DIASTOLIC BLOOD PRESSURE: 64 MMHG | TEMPERATURE: 98 F | HEART RATE: 78 BPM | SYSTOLIC BLOOD PRESSURE: 110 MMHG

## 2023-02-09 LAB
FINAL PATHOLOGIC DIAGNOSIS: NORMAL
GROSS: NORMAL
Lab: NORMAL

## 2023-02-10 ENCOUNTER — TELEPHONE (OUTPATIENT)
Dept: OBSTETRICS AND GYNECOLOGY | Facility: CLINIC | Age: 28
End: 2023-02-10
Payer: COMMERCIAL

## 2023-02-13 ENCOUNTER — OFFICE VISIT (OUTPATIENT)
Dept: PRIMARY CARE CLINIC | Facility: CLINIC | Age: 28
End: 2023-02-13
Payer: COMMERCIAL

## 2023-02-13 ENCOUNTER — PATIENT MESSAGE (OUTPATIENT)
Dept: PRIMARY CARE CLINIC | Facility: CLINIC | Age: 28
End: 2023-02-13
Payer: COMMERCIAL

## 2023-02-13 VITALS
WEIGHT: 146.63 LBS | DIASTOLIC BLOOD PRESSURE: 60 MMHG | SYSTOLIC BLOOD PRESSURE: 90 MMHG | HEART RATE: 88 BPM | BODY MASS INDEX: 25.16 KG/M2 | OXYGEN SATURATION: 98 % | TEMPERATURE: 98 F

## 2023-02-13 DIAGNOSIS — G43.009 MIGRAINE WITHOUT AURA AND WITHOUT STATUS MIGRAINOSUS, NOT INTRACTABLE: ICD-10-CM

## 2023-02-13 DIAGNOSIS — Z98.890 S/P DILATION AND CURETTAGE: ICD-10-CM

## 2023-02-13 DIAGNOSIS — F41.9 ANXIETY AND DEPRESSION: ICD-10-CM

## 2023-02-13 DIAGNOSIS — Z00.00 ENCOUNTER FOR ROUTINE HISTORY AND PHYSICAL EXAMINATION OF ADULT: Primary | ICD-10-CM

## 2023-02-13 DIAGNOSIS — R00.2 PALPITATIONS: ICD-10-CM

## 2023-02-13 DIAGNOSIS — F32.A ANXIETY AND DEPRESSION: ICD-10-CM

## 2023-02-13 DIAGNOSIS — K21.9 GASTROESOPHAGEAL REFLUX DISEASE, UNSPECIFIED WHETHER ESOPHAGITIS PRESENT: ICD-10-CM

## 2023-02-13 PROBLEM — I48.91 ATRIAL FIBRILLATION: Status: RESOLVED | Noted: 2017-06-01 | Resolved: 2023-02-13

## 2023-02-13 PROBLEM — N93.9 ABNORMAL UTERINE BLEEDING (AUB): Status: RESOLVED | Noted: 2023-02-06 | Resolved: 2023-02-13

## 2023-02-13 PROCEDURE — 1159F MED LIST DOCD IN RCRD: CPT | Mod: CPTII,S$GLB,, | Performed by: INTERNAL MEDICINE

## 2023-02-13 PROCEDURE — 1159F PR MEDICATION LIST DOCUMENTED IN MEDICAL RECORD: ICD-10-PCS | Mod: CPTII,S$GLB,, | Performed by: INTERNAL MEDICINE

## 2023-02-13 PROCEDURE — 3008F BODY MASS INDEX DOCD: CPT | Mod: CPTII,S$GLB,, | Performed by: INTERNAL MEDICINE

## 2023-02-13 PROCEDURE — 3008F PR BODY MASS INDEX (BMI) DOCUMENTED: ICD-10-PCS | Mod: CPTII,S$GLB,, | Performed by: INTERNAL MEDICINE

## 2023-02-13 PROCEDURE — 99999 PR PBB SHADOW E&M-EST. PATIENT-LVL III: CPT | Mod: PBBFAC,,, | Performed by: INTERNAL MEDICINE

## 2023-02-13 PROCEDURE — 99499 UNLISTED E&M SERVICE: CPT | Mod: S$GLB,,, | Performed by: INTERNAL MEDICINE

## 2023-02-13 PROCEDURE — 99999 PR PBB SHADOW E&M-EST. PATIENT-LVL III: ICD-10-PCS | Mod: PBBFAC,,, | Performed by: INTERNAL MEDICINE

## 2023-02-13 PROCEDURE — 1160F RVW MEDS BY RX/DR IN RCRD: CPT | Mod: CPTII,S$GLB,, | Performed by: INTERNAL MEDICINE

## 2023-02-13 PROCEDURE — 99499 NO LOS: ICD-10-PCS | Mod: S$GLB,,, | Performed by: INTERNAL MEDICINE

## 2023-02-13 PROCEDURE — 3074F PR MOST RECENT SYSTOLIC BLOOD PRESSURE < 130 MM HG: ICD-10-PCS | Mod: CPTII,S$GLB,, | Performed by: INTERNAL MEDICINE

## 2023-02-13 PROCEDURE — 3078F DIAST BP <80 MM HG: CPT | Mod: CPTII,S$GLB,, | Performed by: INTERNAL MEDICINE

## 2023-02-13 PROCEDURE — 3074F SYST BP LT 130 MM HG: CPT | Mod: CPTII,S$GLB,, | Performed by: INTERNAL MEDICINE

## 2023-02-13 PROCEDURE — 3078F PR MOST RECENT DIASTOLIC BLOOD PRESSURE < 80 MM HG: ICD-10-PCS | Mod: CPTII,S$GLB,, | Performed by: INTERNAL MEDICINE

## 2023-02-13 PROCEDURE — 1160F PR REVIEW ALL MEDS BY PRESCRIBER/CLIN PHARMACIST DOCUMENTED: ICD-10-PCS | Mod: CPTII,S$GLB,, | Performed by: INTERNAL MEDICINE

## 2023-02-13 RX ORDER — OMEPRAZOLE 40 MG/1
40 CAPSULE, DELAYED RELEASE ORAL DAILY
Qty: 90 CAPSULE | Refills: 3 | Status: SHIPPED | OUTPATIENT
Start: 2023-02-13 | End: 2024-02-13
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
## 2023-02-13 NOTE — PATIENT INSTRUCTIONS
Thank you for coming to visit today.  I have spoken to the psychologist who should be contacting you shortly.  If you have not heard from him by Wednesday please call me and I will recontact him.    I have put your orders into the computer and you should schedule those.    I do call all patient's with their labs and my number comes up as no caller ID.  Occasionally for purposes of privacy I may call from an exam and again during the day it may not come up as [facility] but no caller ID.  Please schedule your appointment for 1 month

## 2023-02-22 ENCOUNTER — PATIENT MESSAGE (OUTPATIENT)
Dept: PRIMARY CARE CLINIC | Facility: CLINIC | Age: 28
End: 2023-02-22
Payer: COMMERCIAL

## 2023-02-22 ENCOUNTER — DOCUMENTATION ONLY (OUTPATIENT)
Dept: PRIMARY CARE CLINIC | Facility: CLINIC | Age: 28
End: 2023-02-22
Payer: COMMERCIAL

## 2023-02-22 ENCOUNTER — PATIENT MESSAGE (OUTPATIENT)
Dept: BARIATRICS | Facility: CLINIC | Age: 28
End: 2023-02-22
Payer: COMMERCIAL
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
## 2023-02-24 ENCOUNTER — PATIENT MESSAGE (OUTPATIENT)
Dept: BARIATRICS | Facility: CLINIC | Age: 28
End: 2023-02-24
Payer: COMMERCIAL

## 2023-02-24 ENCOUNTER — LAB VISIT (OUTPATIENT)
Dept: LAB | Facility: HOSPITAL | Age: 28
End: 2023-02-24
Payer: COMMERCIAL

## 2023-02-24 DIAGNOSIS — R00.2 PALPITATIONS: ICD-10-CM

## 2023-02-24 DIAGNOSIS — Z00.00 ENCOUNTER FOR ROUTINE HISTORY AND PHYSICAL EXAMINATION OF ADULT: ICD-10-CM

## 2023-02-24 DIAGNOSIS — Z98.890 S/P DILATION AND CURETTAGE: ICD-10-CM

## 2023-02-24 LAB
ALBUMIN SERPL BCP-MCNC: 3.6 G/DL (ref 3.5–5.2)
ALP SERPL-CCNC: 40 U/L (ref 55–135)
ALT SERPL W/O P-5'-P-CCNC: 9 U/L (ref 10–44)
ANION GAP SERPL CALC-SCNC: 8 MMOL/L (ref 8–16)
AST SERPL-CCNC: 13 U/L (ref 10–40)
BASOPHILS # BLD AUTO: 0.04 K/UL (ref 0–0.2)
BASOPHILS NFR BLD: 0.8 % (ref 0–1.9)
BILIRUB SERPL-MCNC: 0.6 MG/DL (ref 0.1–1)
BUN SERPL-MCNC: 9 MG/DL (ref 6–20)
CALCIUM SERPL-MCNC: 9 MG/DL (ref 8.7–10.5)
CHLORIDE SERPL-SCNC: 107 MMOL/L (ref 95–110)
CHOLEST SERPL-MCNC: 150 MG/DL (ref 120–199)
CHOLEST/HDLC SERPL: 3.2 {RATIO} (ref 2–5)
CO2 SERPL-SCNC: 25 MMOL/L (ref 23–29)
CREAT SERPL-MCNC: 0.7 MG/DL (ref 0.5–1.4)
DIFFERENTIAL METHOD: ABNORMAL
EOSINOPHIL # BLD AUTO: 0.2 K/UL (ref 0–0.5)
EOSINOPHIL NFR BLD: 3.1 % (ref 0–8)
ERYTHROCYTE [DISTWIDTH] IN BLOOD BY AUTOMATED COUNT: 13.1 % (ref 11.5–14.5)
EST. GFR  (NO RACE VARIABLE): >60 ML/MIN/1.73 M^2
ESTIMATED AVG GLUCOSE: 88 MG/DL (ref 68–131)
FERRITIN SERPL-MCNC: 33 NG/ML (ref 20–300)
GLUCOSE SERPL-MCNC: 73 MG/DL (ref 70–110)
HBA1C MFR BLD: 4.7 % (ref 4–5.6)
HCT VFR BLD AUTO: 38.3 % (ref 37–48.5)
HDLC SERPL-MCNC: 47 MG/DL (ref 40–75)
HDLC SERPL: 31.3 % (ref 20–50)
HGB BLD-MCNC: 12.2 G/DL (ref 12–16)
IMM GRANULOCYTES # BLD AUTO: 0.01 K/UL (ref 0–0.04)
IMM GRANULOCYTES NFR BLD AUTO: 0.2 % (ref 0–0.5)
LDLC SERPL CALC-MCNC: 90.2 MG/DL (ref 63–159)
LYMPHOCYTES # BLD AUTO: 1.3 K/UL (ref 1–4.8)
LYMPHOCYTES NFR BLD: 27.2 % (ref 18–48)
MAGNESIUM SERPL-MCNC: 1.9 MG/DL (ref 1.6–2.6)
MCH RBC QN AUTO: 31 PG (ref 27–31)
MCHC RBC AUTO-ENTMCNC: 31.9 G/DL (ref 32–36)
MCV RBC AUTO: 97 FL (ref 82–98)
MONOCYTES # BLD AUTO: 0.3 K/UL (ref 0.3–1)
MONOCYTES NFR BLD: 5.8 % (ref 4–15)
NEUTROPHILS # BLD AUTO: 3.1 K/UL (ref 1.8–7.7)
NEUTROPHILS NFR BLD: 62.9 % (ref 38–73)
NONHDLC SERPL-MCNC: 103 MG/DL
NRBC BLD-RTO: 0 /100 WBC
PLATELET # BLD AUTO: 343 K/UL (ref 150–450)
PMV BLD AUTO: 9.7 FL (ref 9.2–12.9)
POTASSIUM SERPL-SCNC: 3.8 MMOL/L (ref 3.5–5.1)
PROT SERPL-MCNC: 6.3 G/DL (ref 6–8.4)
RBC # BLD AUTO: 3.94 M/UL (ref 4–5.4)
SODIUM SERPL-SCNC: 140 MMOL/L (ref 136–145)
TRIGL SERPL-MCNC: 64 MG/DL (ref 30–150)
WBC # BLD AUTO: 4.86 K/UL (ref 3.9–12.7)

## 2023-02-24 PROCEDURE — 85025 COMPLETE CBC W/AUTO DIFF WBC: CPT | Performed by: INTERNAL MEDICINE

## 2023-02-24 PROCEDURE — 36415 COLL VENOUS BLD VENIPUNCTURE: CPT | Performed by: INTERNAL MEDICINE

## 2023-02-24 PROCEDURE — 83036 HEMOGLOBIN GLYCOSYLATED A1C: CPT | Performed by: INTERNAL MEDICINE

## 2023-02-24 PROCEDURE — 80061 LIPID PANEL: CPT | Performed by: INTERNAL MEDICINE

## 2023-02-24 PROCEDURE — 82728 ASSAY OF FERRITIN: CPT | Performed by: INTERNAL MEDICINE

## 2023-02-24 PROCEDURE — 80053 COMPREHEN METABOLIC PANEL: CPT | Performed by: INTERNAL MEDICINE

## 2023-02-24 PROCEDURE — 83735 ASSAY OF MAGNESIUM: CPT | Performed by: INTERNAL MEDICINE

## 2023-02-24 RX ORDER — TOPIRAMATE 25 MG/1
25 TABLET ORAL 2 TIMES DAILY
Qty: 180 TABLET | Refills: 3 | Status: SHIPPED | OUTPATIENT
Start: 2023-02-24 | End: 2023-06-16

## 2023-02-24 RX ORDER — METFORMIN HYDROCHLORIDE 500 MG/1
500 TABLET ORAL 2 TIMES DAILY WITH MEALS
Qty: 180 TABLET | Refills: 3 | Status: SHIPPED | OUTPATIENT
Start: 2023-02-24 | End: 2024-02-24

## 2023-02-24 RX ORDER — TOPIRAMATE 25 MG/1
25 TABLET ORAL 2 TIMES DAILY
COMMUNITY
End: 2023-02-24 | Stop reason: SDUPTHER

## 2023-02-24 RX ORDER — TOPIRAMATE 25 MG/1
25 TABLET ORAL 2 TIMES DAILY
Status: CANCELLED | OUTPATIENT
Start: 2023-02-24

## 2023-02-24 NOTE — PROGRESS NOTES
Reviewed lab with patient.  Hemoglobin A1c and ferritin are pending.  The patient is looking for alternatives to a G LP 1 that she was previously on and metformin 500 mg with breakfast and dinner was prescribed.  This patient's case who has a family member with a history of diabetes this would also be preventive of any glucose intolerance and progression to diabetes.

## 2023-03-06 ENCOUNTER — PATIENT MESSAGE (OUTPATIENT)
Dept: PRIMARY CARE CLINIC | Facility: CLINIC | Age: 28
End: 2023-03-06
Payer: COMMERCIAL

## 2023-03-08 ENCOUNTER — DOCUMENTATION ONLY (OUTPATIENT)
Dept: PRIMARY CARE CLINIC | Facility: CLINIC | Age: 28
End: 2023-03-08
Payer: COMMERCIAL

## 2023-03-08 NOTE — PROGRESS NOTES
Individual Psychotherapy (LCSW/PhD)  Rhea Hui Gilman,  3/8/2023    Site:  [facility]         Therapeutic Intervention: Met with patient for individual psychotherapy.    Chief complaint/reason for encounter: attention deficit and anxiety     Interval history and content of current session: Pt was able to have a conversation with manager and express needs. Pt expresses that they feel that they are struggling to synthesize and incorporate information especially during conversation. ASRSv1.1 6 of 6, 12 of 12.    LCSW discussed pt's Hx of compensating for distractibility I.e. second guessing, taking twice as long to finish tasks. LCSW and pt discussed pt suing notebook to triage incoming messages and better organized thoughts. Pt is planning on ordering a book on ADHD and sharing information with LCSW.  LCSW and pt discussed follow-up with PCP for medication.    Treatment plan:  Target symptoms: distractability, anxiety   Why chosen therapy is appropriate versus another modality: relevant to diagnosis, evidence based practice  Outcome monitoring methods: self-report, checklist/rating scale  Therapeutic intervention type: behavior modifying psychotherapy, supportive psychotherapy    Risk parameters:  Patient reports no suicidal ideation  Patient reports no homicidal ideation  Patient reports no self-injurious behavior  Patient reports no violent behavior    Verbal deficits: None    Patient's response to intervention:  The patient's response to intervention is accepting.    Progress toward goals and other mental status changes:  The patient's progress toward goals is limited.    Diagnosis:   No diagnosis found.    Plan: Pt plans to continue individual psychotherapy    Return to clinic: 2 weeks    Length of Service (minutes): 30

## 2023-03-13 ENCOUNTER — OFFICE VISIT (OUTPATIENT)
Dept: PRIMARY CARE CLINIC | Facility: CLINIC | Age: 28
End: 2023-03-13
Payer: COMMERCIAL

## 2023-03-13 VITALS
HEART RATE: 82 BPM | HEIGHT: 64 IN | OXYGEN SATURATION: 99 % | WEIGHT: 150.13 LBS | TEMPERATURE: 98 F | BODY MASS INDEX: 25.63 KG/M2 | SYSTOLIC BLOOD PRESSURE: 112 MMHG | DIASTOLIC BLOOD PRESSURE: 78 MMHG

## 2023-03-13 DIAGNOSIS — R00.2 PALPITATIONS: ICD-10-CM

## 2023-03-13 DIAGNOSIS — F98.8 ATTENTION DEFICIT DISORDER, UNSPECIFIED HYPERACTIVITY PRESENCE: Primary | ICD-10-CM

## 2023-03-13 DIAGNOSIS — K21.9 GASTROESOPHAGEAL REFLUX DISEASE, UNSPECIFIED WHETHER ESOPHAGITIS PRESENT: ICD-10-CM

## 2023-03-13 DIAGNOSIS — G43.009 MIGRAINE WITHOUT AURA AND WITHOUT STATUS MIGRAINOSUS, NOT INTRACTABLE: ICD-10-CM

## 2023-03-13 DIAGNOSIS — E66.3 OVERWEIGHT (BMI 25.0-29.9): ICD-10-CM

## 2023-03-13 PROCEDURE — 3044F PR MOST RECENT HEMOGLOBIN A1C LEVEL <7.0%: ICD-10-PCS | Mod: CPTII,S$GLB,, | Performed by: INTERNAL MEDICINE

## 2023-03-13 PROCEDURE — 99999 PR PBB SHADOW E&M-EST. PATIENT-LVL IV: CPT | Mod: PBBFAC,,, | Performed by: INTERNAL MEDICINE

## 2023-03-13 PROCEDURE — 3008F PR BODY MASS INDEX (BMI) DOCUMENTED: ICD-10-PCS | Mod: CPTII,S$GLB,, | Performed by: INTERNAL MEDICINE

## 2023-03-13 PROCEDURE — 99999 PR PBB SHADOW E&M-EST. PATIENT-LVL IV: ICD-10-PCS | Mod: PBBFAC,,, | Performed by: INTERNAL MEDICINE

## 2023-03-13 PROCEDURE — 3078F PR MOST RECENT DIASTOLIC BLOOD PRESSURE < 80 MM HG: ICD-10-PCS | Mod: CPTII,S$GLB,, | Performed by: INTERNAL MEDICINE

## 2023-03-13 PROCEDURE — 3044F HG A1C LEVEL LT 7.0%: CPT | Mod: CPTII,S$GLB,, | Performed by: INTERNAL MEDICINE

## 2023-03-13 PROCEDURE — 1160F PR REVIEW ALL MEDS BY PRESCRIBER/CLIN PHARMACIST DOCUMENTED: ICD-10-PCS | Mod: CPTII,S$GLB,, | Performed by: INTERNAL MEDICINE

## 2023-03-13 PROCEDURE — 3008F BODY MASS INDEX DOCD: CPT | Mod: CPTII,S$GLB,, | Performed by: INTERNAL MEDICINE

## 2023-03-13 PROCEDURE — 1159F PR MEDICATION LIST DOCUMENTED IN MEDICAL RECORD: ICD-10-PCS | Mod: CPTII,S$GLB,, | Performed by: INTERNAL MEDICINE

## 2023-03-13 PROCEDURE — 99499 UNLISTED E&M SERVICE: CPT | Mod: S$GLB,,, | Performed by: INTERNAL MEDICINE

## 2023-03-13 PROCEDURE — 3074F PR MOST RECENT SYSTOLIC BLOOD PRESSURE < 130 MM HG: ICD-10-PCS | Mod: CPTII,S$GLB,, | Performed by: INTERNAL MEDICINE

## 2023-03-13 PROCEDURE — 99499 NO LOS: ICD-10-PCS | Mod: S$GLB,,, | Performed by: INTERNAL MEDICINE

## 2023-03-13 PROCEDURE — 3078F DIAST BP <80 MM HG: CPT | Mod: CPTII,S$GLB,, | Performed by: INTERNAL MEDICINE

## 2023-03-13 PROCEDURE — 1159F MED LIST DOCD IN RCRD: CPT | Mod: CPTII,S$GLB,, | Performed by: INTERNAL MEDICINE

## 2023-03-13 PROCEDURE — 1160F RVW MEDS BY RX/DR IN RCRD: CPT | Mod: CPTII,S$GLB,, | Performed by: INTERNAL MEDICINE

## 2023-03-13 PROCEDURE — 3074F SYST BP LT 130 MM HG: CPT | Mod: CPTII,S$GLB,, | Performed by: INTERNAL MEDICINE

## 2023-03-13 RX ORDER — LISDEXAMFETAMINE DIMESYLATE CAPSULES 20 MG/1
20 CAPSULE ORAL EVERY MORNING
Qty: 30 CAPSULE | Refills: 0 | Status: SHIPPED | OUTPATIENT
Start: 2023-03-13 | End: 2023-04-06 | Stop reason: SDUPTHER

## 2023-03-13 NOTE — PATIENT INSTRUCTIONS
Thank you for coming to visit today.  As you know I had spoken to Eloy and I have agreed to give you medication for your ADD.  I am glad you are making progress but please realize that this is limited as we try to work on solving the problem.  Please  your prescription at the pharmacy.  Also in about 3 weeks call the office to remind me to renew it so you can pick it up again.  If there are any problems please call

## 2023-03-13 NOTE — PROGRESS NOTES
27-year-old woman with a history of dysrhythmia/palpitations, migraine, reflux, and obesity, here for follow-up.    History of present illness and pertinent review of systems:  1. Dysrhythmia/palpitations:  The patient has no chest pain, atypical chest pain, or symptoms of angina.  She is had no symptoms of syncope or lightheadedness.  She is had no symptoms of TIA.  She states she did try Sudafed which did not help with her ADD but also did not increase her palpitations.      2. Obesity:  As noted the patient previously lost weight on semaglutide injections.  She no longer can get this medication.  She was placed on metformin and Topamax but notes that she gained weight since her last appointment.  She also notes that she is due for her menses.  She has no symptoms of diabetes or thyroid disease.  She has not changed her diet.      3. ADD:  Patient is currently having cognitive therapy.  She is working on time management and she feels she is doing better.  She continues to have issues on focusing.  As noted Sudafed did not work.  Her issue is that she has a child and she needs to be able to finish her work on time.  She would like a medication and I spoke to her therapist about this.    4. Migraine:  The patient is now on Topamax and her migraines are controlled.  She feels in general that if she does have a headache it is not as severe.    5. Acid reflux:  This has been controlled with the omeprazole.  She has no unexplained cough, sense of food sticking, heartburn, or problems swallowing     Problem list, medication list, and allergies were reviewed.      Remaining review of systems is negative.    Physical Exam  Vitals and nursing note reviewed.   Constitutional:       General: She is not in acute distress.     Appearance: Normal appearance. She is not ill-appearing.   HENT:      Head: Atraumatic.      Nose: Nose normal. No congestion or rhinorrhea.      Mouth/Throat:      Mouth: Mucous membranes are moist.       Pharynx: Oropharynx is clear. No oropharyngeal exudate.   Eyes:      General: No scleral icterus.     Extraocular Movements: Extraocular movements intact.      Conjunctiva/sclera: Conjunctivae normal.      Pupils: Pupils are equal, round, and reactive to light.      Comments: Fundi are benign without exudate hemorrhage or scar.   Neck:      Vascular: No carotid bruit.   Cardiovascular:      Rate and Rhythm: Normal rate and regular rhythm.      Pulses: Normal pulses.   Pulmonary:      Effort: Pulmonary effort is normal. No respiratory distress.      Breath sounds: Normal breath sounds. No wheezing, rhonchi or rales.   Abdominal:      General: Bowel sounds are normal. There is no distension.      Palpations: Abdomen is soft.      Tenderness: There is no abdominal tenderness. There is no guarding.   Musculoskeletal:         General: No swelling or tenderness.      Cervical back: Neck supple. No rigidity or tenderness.      Right lower leg: No edema.      Left lower leg: No edema.   Skin:     Coloration: Skin is not jaundiced.      Findings: No bruising or rash.   Neurological:      General: No focal deficit present.      Mental Status: She is alert and oriented to person, place, and time.      Gait: Gait normal.   Psychiatric:         Mood and Affect: Mood normal.     Assessment/plan:  1. Dysrhythmia/palpitations:  Currently controlled on her Inderal.  She had no increased palpitations on Sudafed.  She is aware that ADD medicines can create palpitations.  I have also commented on the fact that PPI such as omeprazole can reduce magnesium absorption and may lead to palpitations as well.    Plan:  Reviewed above with patient.  Continue current management.  Patient is to contact me if she has any palpitations on the ADD medicine prescribed.      2. Obesity:  The patient is on metformin in addition topiramate.  She notes some constipation which is unusual since starting the metformin.  She has no symptoms of diabetes or  thyroid disease.  Plan: Continue current management.    For constipation the patient can use MiraLax and/or prune juice or apricots juice.  The juices should be slightly warm did not cold.         3. ADD:  The patient is currently having therapy and the therapist spoke to me with the patient's knowledge.  He is working with her on time management skills and the patient feel she is doing better the issue continues to remain the patient complains of problems focusing.  She has used Adderall in the past and did not experience any palpitations however she also stated that the dose she took it did not work.  Plan:  Reviewed the plan that I discussed with her therapist which is the short term use of medication while the patient and therapist work together to improve move her focusing skills.    Vyvanse 20 mg was prescribed.    The patient will contact the office when she has 7 pills remaining for an additional script.  She is also aware that the prescription of these medications temporary.  If she experiences palpitations she will contact me.      4. Migraine:  The patient notes in general with therapy she has improved mood and her migraine seem to be less.  She is on the topiramate.    Plan:  Continue present management.    5. Acid reflux: This is also improved and the patient has no worrisome symptoms.  She is on omeprazole.  Plan:  Reviewed omeprazole including the fact that may interfere with magnesium absorption.  Previously I discussed it may also interfere with certain vitamin is alert chin particularly B12.

## 2023-03-23 ENCOUNTER — PATIENT MESSAGE (OUTPATIENT)
Dept: PRIMARY CARE CLINIC | Facility: CLINIC | Age: 28
End: 2023-03-23
Payer: COMMERCIAL

## 2023-03-30 ENCOUNTER — TELEPHONE (OUTPATIENT)
Dept: PRIMARY CARE CLINIC | Facility: CLINIC | Age: 28
End: 2023-03-30
Payer: COMMERCIAL

## 2023-03-30 ENCOUNTER — PATIENT MESSAGE (OUTPATIENT)
Dept: PRIMARY CARE CLINIC | Facility: CLINIC | Age: 28
End: 2023-03-30
Payer: COMMERCIAL

## 2023-03-30 NOTE — TELEPHONE ENCOUNTER
Spoke to the patient regarding her request for med gyro.  I explained that the plan will not cover this for obesity only for diabetes

## 2023-03-31 ENCOUNTER — TELEPHONE (OUTPATIENT)
Dept: PRIMARY CARE CLINIC | Facility: CLINIC | Age: 28
End: 2023-03-31
Payer: COMMERCIAL

## 2023-04-06 RX ORDER — LISDEXAMFETAMINE DIMESYLATE CAPSULES 20 MG/1
20 CAPSULE ORAL EVERY MORNING
Qty: 30 CAPSULE | Refills: 0 | Status: SHIPPED | OUTPATIENT
Start: 2023-04-06 | End: 2023-05-11

## 2023-04-06 RX ORDER — LISDEXAMFETAMINE DIMESYLATE CAPSULES 20 MG/1
20 CAPSULE ORAL EVERY MORNING
Qty: 30 CAPSULE | Refills: 0 | Status: CANCELLED | OUTPATIENT
Start: 2023-04-06 | End: 2024-04-05

## 2023-05-10 ENCOUNTER — PATIENT MESSAGE (OUTPATIENT)
Dept: PRIMARY CARE CLINIC | Facility: CLINIC | Age: 28
End: 2023-05-10
Payer: COMMERCIAL

## 2023-05-11 RX ORDER — LISDEXAMFETAMINE DIMESYLATE 30 MG/1
30 CAPSULE ORAL EVERY MORNING
Qty: 30 CAPSULE | Refills: 0 | Status: SHIPPED | OUTPATIENT
Start: 2023-05-11 | End: 2023-06-16 | Stop reason: SDUPTHER

## 2023-05-11 NOTE — PROGRESS NOTES
Reviewed experience of patient with Vyvanse this far.  She was started on a lower dose but would like a higher dose feeling that would work better.  Spoke to her psychologist to further discuss.  Also explained to the patient is my hope that in 6 months to year after learning sufficient techniques that she will be able to master some of her ADHD without any medication.  Patient understands she will need  to be seen every 90 days

## 2023-05-24 RX ORDER — METRONIDAZOLE 7.5 MG/G
GEL TOPICAL 2 TIMES DAILY
Qty: 45 G | Refills: 0 | Status: SHIPPED | OUTPATIENT
Start: 2023-05-24 | End: 2023-05-29

## 2023-05-29 DIAGNOSIS — N76.0 BV (BACTERIAL VAGINOSIS): Primary | ICD-10-CM

## 2023-05-29 DIAGNOSIS — B96.89 BV (BACTERIAL VAGINOSIS): Primary | ICD-10-CM

## 2023-05-29 RX ORDER — METRONIDAZOLE 7.5 MG/G
1 GEL VAGINAL 2 TIMES DAILY
Qty: 70 G | Refills: 0 | Status: SHIPPED | OUTPATIENT
Start: 2023-05-29 | End: 2023-05-29 | Stop reason: SDUPTHER

## 2023-06-01 DIAGNOSIS — R11.0 NAUSEA: Primary | ICD-10-CM

## 2023-06-01 RX ORDER — ONDANSETRON 8 MG/1
8 TABLET, ORALLY DISINTEGRATING ORAL EVERY 6 HOURS PRN
Qty: 30 TABLET | Refills: 1 | Status: SHIPPED | OUTPATIENT
Start: 2023-06-01

## 2023-06-12 NOTE — PROGRESS NOTES
[facility]        NAME: Valerie James is a 21 y o  male  : 2000    MRN: [id number]  DATE: 2023  TIME: 2:58 PM    Assessment and Plan   Diarrhea, unspecified type [R19 7]  1  Diarrhea, unspecified type              Patient Instructions       Follow up with PCP in 3-5 days  Proceed to ER if symptoms worsen  Chief Complaint     Chief Complaint   Patient presents with   • Diarrhea     Patient has diarrhea on the weekend and called out of work and needs note to go back to work tonight         History of Present Illness       Diarrhea 2 days ago, once last night  Currently feels better  States he needs a note to be allowed to return to work  Review of Systems   Review of Systems   Constitutional: Negative for chills and fever  HENT: Negative for ear pain and sore throat  Eyes: Negative for pain and visual disturbance  Respiratory: Negative for cough and shortness of breath  Cardiovascular: Negative for chest pain and palpitations  Gastrointestinal: Negative for abdominal pain and vomiting  Genitourinary: Negative for dysuria and hematuria  Musculoskeletal: Negative for arthralgias and back pain  Skin: Negative for color change and rash  Neurological: Negative for dizziness, seizures, syncope, weakness and light-headedness  All other systems reviewed and are negative  Current Medications       Current Outpatient Medications:   •  benzonatate (TESSALON) 200 MG capsule, Take 1 capsule (200 mg total) by mouth 3 (three) times a day as needed for cough (Patient not taking: Reported on 2023), Disp: 20 capsule, Rfl: 0  •  naproxen (NAPROSYN) 500 mg tablet, 1 tablet twice a day with food as needed for knee pain   (Patient not taking: Reported on 2021), Disp: 30 tablet, Rfl: 0    Current Allergies     Allergies as of 2023   • (No Known Allergies)            The following portions of the patient's history were reviewed and updated as appropriate: allergies, [facility] Progress Note  6/2/2017     Last 24 hours:  --Converted from afib  --tox screen positive  --UA positive    Subjective:  No chest pain/SOB/palpitations/fever/chills    Objective:  Vitals:    06/02/17 0700 06/02/17 0730 06/02/17 0800 06/02/17 0830   BP: (!) 108/59 (!) 109/56 (!) 111/56 113/61   Pulse: 106 82 80 86   Resp: 15 (!) 24 (!) 21 (!) 21   Temp:   98.4 °F (36.9 °C)    TempSrc:   Oral    SpO2: 99% 100% 99% 100%   Weight:       Height:         A&O, NAD  RRR  CTAB  Gravid, soft, nt  No edema/clubbing/cyanosis    Labs: reviewed    TTE-normal    Assessment:  1. New Onset Afib-converted after some dilt; drugs vs. UTI vs electrolytes    Plan:  1. Defer chronic mgmt to cardiology    Thank you for the consult. Please call with questions.    Andrea Kenney MD  [employer] [facility] Fellow  [phone]     current medications, past family history, past medical history, past social history, past surgical history and problem list      History reviewed  No pertinent past medical history  History reviewed  No pertinent surgical history  History reviewed  No pertinent family history  Medications have been verified  Objective   /72   Pulse 78   Temp 97 8 °F (36 6 °C) (Tympanic)   Resp 18   Ht 6' (1 829 m)   Wt 125 kg (275 lb)   SpO2 98%   BMI 37 30 kg/m²   No LMP for male patient  Physical Exam     Physical Exam  Pulmonary:      Effort: Pulmonary effort is normal    Abdominal:      General: There is no distension  Palpations: Abdomen is soft  Tenderness: There is no abdominal tenderness  There is no right CVA tenderness or left CVA tenderness  Musculoskeletal:         General: Normal range of motion  Skin:     General: Skin is warm and dry  Capillary Refill: Capillary refill takes less than 2 seconds  Neurological:      General: No focal deficit present  Mental Status: He is oriented to person, place, and time  Mental status is at baseline     Psychiatric:         Mood and Affect: Mood normal          Behavior: Behavior normal

## 2023-06-13 RX ORDER — LISDEXAMFETAMINE DIMESYLATE 30 MG/1
30 CAPSULE ORAL EVERY MORNING
Qty: 30 CAPSULE | Refills: 0 | Status: CANCELLED | OUTPATIENT
Start: 2023-06-13 | End: 2023-07-13

## 2023-06-16 ENCOUNTER — OFFICE VISIT (OUTPATIENT)
Dept: PRIMARY CARE CLINIC | Facility: CLINIC | Age: 28
End: 2023-06-16
Payer: COMMERCIAL

## 2023-06-16 VITALS
BODY MASS INDEX: 26.53 KG/M2 | HEART RATE: 105 BPM | TEMPERATURE: 98 F | WEIGHT: 154.56 LBS | DIASTOLIC BLOOD PRESSURE: 58 MMHG | SYSTOLIC BLOOD PRESSURE: 106 MMHG | OXYGEN SATURATION: 98 %

## 2023-06-16 DIAGNOSIS — D64.9 ANEMIA, UNSPECIFIED TYPE: ICD-10-CM

## 2023-06-16 DIAGNOSIS — E88.819 INSULIN RESISTANCE: ICD-10-CM

## 2023-06-16 DIAGNOSIS — E66.3 OVERWEIGHT (BMI 25.0-29.9): Primary | ICD-10-CM

## 2023-06-16 DIAGNOSIS — F90.1 ATTENTION DEFICIT HYPERACTIVITY DISORDER (ADHD), PREDOMINANTLY HYPERACTIVE TYPE: Primary | ICD-10-CM

## 2023-06-16 LAB
BILIRUB UR QL STRIP: NEGATIVE
CLARITY UR REFRACT.AUTO: CLEAR
COLOR UR AUTO: COLORLESS
GLUCOSE UR QL STRIP: NEGATIVE
HGB UR QL STRIP: NEGATIVE
KETONES UR QL STRIP: NEGATIVE
LEUKOCYTE ESTERASE UR QL STRIP: NEGATIVE
NITRITE UR QL STRIP: NEGATIVE
PH UR STRIP: 7 [PH] (ref 5–8)
PROT UR QL STRIP: NEGATIVE
SP GR UR STRIP: 1.01 (ref 1–1.03)
URN SPEC COLLECT METH UR: ABNORMAL

## 2023-06-16 PROCEDURE — 99499 UNLISTED E&M SERVICE: CPT | Mod: S$GLB,,, | Performed by: INTERNAL MEDICINE

## 2023-06-16 PROCEDURE — 99499 NO LOS: ICD-10-PCS | Mod: S$GLB,,, | Performed by: INTERNAL MEDICINE

## 2023-06-16 PROCEDURE — 81003 URINALYSIS AUTO W/O SCOPE: CPT | Performed by: INTERNAL MEDICINE

## 2023-06-16 RX ORDER — LISDEXAMFETAMINE DIMESYLATE 30 MG/1
30 CAPSULE ORAL EVERY MORNING
Qty: 30 CAPSULE | Refills: 0 | Status: SHIPPED | OUTPATIENT
Start: 2023-06-16 | End: 2023-07-24 | Stop reason: SDUPTHER

## 2023-06-16 RX ORDER — BUSPIRONE HYDROCHLORIDE 5 MG/1
5 TABLET ORAL 2 TIMES DAILY PRN
Qty: 30 TABLET | Refills: 1 | Status: SHIPPED | OUTPATIENT
Start: 2023-06-16 | End: 2023-10-02

## 2023-06-16 NOTE — PATIENT INSTRUCTIONS
Thank you for coming to visit.  Your prescription for Vyvanse was refilled.    In Addition I gave you a prescription for BuSpar for occasional anxiety.  When you return please let me know how often used it or how many pills left in the bottle.  We have stopped your topiramate per your request.  For your migraines we discussed taking 1-2 ibuprofen with a acetaminophen 650 mg extended release with onset of either nausea or visual blurriness that seems define your migraine.  Alternative suggestion would be to take 1 ibuprofen with 1 naproxen/Alleve and 1 Tylenol arthritis which is likely to reduce rebound headache though you have not experienced it at this time.  Please get your laboratory on your way out   For your left lower quadrant discomfort please keep a diary  For your hard stool please increase your water such that your urine is a pale yellow and consider taking a stool softener without a laxative such as Colace or consider prune juice.  Please make sure you have a regular bowel time

## 2023-06-16 NOTE — PROGRESS NOTES
28-year-old woman with a history of dysrhythmia/palpitations, migraine, reflux, and being overweight here for follow-up.    Pertinent review of systems:  ADHD:  On Vyvanse and doing better.  He is also getting counseling.  He is had no palpitations or symptoms from the Vyvanse that she is aware of.  She has non associated anxiety once or twice a week.    Migraine:  The patient stopped Topamax but is not having very frequent headaches at this time.  She notes that she has a headache less than 1 time per week.  She is however on her Inderal.  With her headaches she has found relief with ibuprofen 400 mg half of a 325 mg Tylenol.  She has not experienced rebound.  Headaches typically are preceded by nausea and occasionally her left eye may blurred.  For the nausea she may take a Zofran which she has a prescription of.  There is no change in the pattern of migraine, intensity my migraine, or duration of migraine.    GI complaints:  The patient's reflux is doing well.  She notes no heartburn with the omeprazole.  She does not have any dysphagia, change in her voice, or unexplained cough.  She does however have some symptoms of occasional left lower quadrant pain.  She has no blood in her stool.  The pain is not necessarily associated with bowel movements or eating.  She does note that her stool is hard.  She does however move her bowels every day.  She may not drink enough fluids.  And she has no regular bowel time.    Problem list, medication list, and allergies have been reviewed.  Patient hives with amoxicillin    Review of systems is positive for the fact the patient wears glasses.  She notes increased odor to her urine but she does take a multiple vitamin that may have higher proportion of B vitamins.  She also notes that she is feeling cold and she has a history of anemia.  She has no symptoms of thyroid disease.    Physical Exam  Vitals reviewed.   Constitutional:       General: She is not in acute distress.      Appearance: Normal appearance. She is not ill-appearing.   HENT:      Right Ear: Tympanic membrane, ear canal and external ear normal.      Left Ear: Tympanic membrane, ear canal and external ear normal.      Nose: No congestion or rhinorrhea.      Mouth/Throat:      Mouth: Mucous membranes are moist.      Pharynx: Oropharynx is clear. No oropharyngeal exudate or posterior oropharyngeal erythema.   Eyes:      General: No scleral icterus.     Extraocular Movements: Extraocular movements intact.      Conjunctiva/sclera: Conjunctivae normal.      Pupils: Pupils are equal, round, and reactive to light.      Comments: Fundi benign without exudate hemorrhage or scar   Neck:      Vascular: No carotid bruit.      Comments: Thyroid is without enlargement, nodule, or bruit.  Cardiovascular:      Rate and Rhythm: Normal rate and regular rhythm.      Pulses: Normal pulses.      Heart sounds: Normal heart sounds. No murmur heard.    No gallop.   Pulmonary:      Effort: Pulmonary effort is normal. No respiratory distress.      Breath sounds: Normal breath sounds. No wheezing, rhonchi or rales.   Abdominal:      General: Abdomen is flat. Bowel sounds are normal.      Palpations: Abdomen is soft. There is no mass.      Tenderness: There is abdominal tenderness. There is no guarding or rebound.      Comments: Patient has some vague tenderness in the left lower quadrant.   Musculoskeletal:         General: No swelling or tenderness.      Cervical back: Neck supple. No tenderness.      Right lower leg: No edema.      Left lower leg: No edema.   Lymphadenopathy:      Cervical: No cervical adenopathy.   Skin:     Coloration: Skin is not jaundiced or pale.      Findings: No rash.   Neurological:      General: No focal deficit present.      Mental Status: She is alert.   Psychiatric:         Mood and Affect: Mood normal.     Assessment/plan:  ADHD:  Patient is improved on medication has not experienced any significant side effect either  by my own history or by her recognition.  She is improving and feels like the combination of Vyvanse with cognitive behavioral therapy is making a difference.    Plan: Continue present management.      Migraine:  The patient is stop the Topamax because she did not think it was effective.  She is not had any increase in headache.  There are no worrisome historical symptoms related to her headache and her headaches are usually relieved with over-the-counter remedy.    Plan: Continue present management.  Discussed possible rebound headaches when only using short-acting medication and I gave her an over-the-counter alternative of what ibuprofen, when naproxen, and 1 acetaminophen 650 mg extended release taken with the onset of headache.    GI symptoms:  Patient's reflux is stable.  Her other GI symptoms may suggest some irritable bowel syndrome which includes bloating in hard stool.  There are no worrisome symptoms such as nocturnal pain which wakes from sleep or weight.    Plan:  Reviewed above  If this time no GI consult as necessary  Diary of the vague left lower quadrant pain.    Continue with the omeprazole   The patient has the suck on a peppermint after each meal to see if this reduces bloating.    Anxiety:  This is transient is not accompanied by symptoms of depression.  She just feels like occasionally she may need something.  A detailed inquiry was made whether this could be related to Vyvanse but the symptom was present prior to that.  Plan: Reviewed above with patient.    Discussed with patient fact that every symptom does not require medication   BusPar 5 mg p.r.n. anxiety number 30 and no refills were given.

## 2023-06-18 ENCOUNTER — TELEPHONE (OUTPATIENT)
Dept: PRIMARY CARE CLINIC | Facility: CLINIC | Age: 28
End: 2023-06-18
Payer: COMMERCIAL

## 2023-06-18 NOTE — TELEPHONE ENCOUNTER
Reviewed lab with patient.  No significant clinical abnormalities.  Patient should continue current management.

## 2023-06-19 ENCOUNTER — OFFICE VISIT (OUTPATIENT)
Dept: BARIATRICS | Facility: CLINIC | Age: 28
End: 2023-06-19
Payer: COMMERCIAL

## 2023-06-19 VITALS
DIASTOLIC BLOOD PRESSURE: 67 MMHG | SYSTOLIC BLOOD PRESSURE: 111 MMHG | HEART RATE: 88 BPM | OXYGEN SATURATION: 100 % | WEIGHT: 149.13 LBS | BODY MASS INDEX: 25.59 KG/M2

## 2023-06-19 DIAGNOSIS — E66.3 OVERWEIGHT (BMI 25.0-29.9): Primary | ICD-10-CM

## 2023-06-19 DIAGNOSIS — Z71.3 ENCOUNTER FOR WEIGHT LOSS COUNSELING: ICD-10-CM

## 2023-06-19 PROCEDURE — 99213 PR OFFICE/OUTPT VISIT, EST, LEVL III, 20-29 MIN: ICD-10-PCS | Mod: S$GLB,,, | Performed by: STUDENT IN AN ORGANIZED HEALTH CARE EDUCATION/TRAINING PROGRAM

## 2023-06-19 PROCEDURE — 1159F PR MEDICATION LIST DOCUMENTED IN MEDICAL RECORD: ICD-10-PCS | Mod: CPTII,S$GLB,, | Performed by: STUDENT IN AN ORGANIZED HEALTH CARE EDUCATION/TRAINING PROGRAM

## 2023-06-19 PROCEDURE — 3078F PR MOST RECENT DIASTOLIC BLOOD PRESSURE < 80 MM HG: ICD-10-PCS | Mod: CPTII,S$GLB,, | Performed by: STUDENT IN AN ORGANIZED HEALTH CARE EDUCATION/TRAINING PROGRAM

## 2023-06-19 PROCEDURE — 3008F BODY MASS INDEX DOCD: CPT | Mod: CPTII,S$GLB,, | Performed by: STUDENT IN AN ORGANIZED HEALTH CARE EDUCATION/TRAINING PROGRAM

## 2023-06-19 PROCEDURE — 3008F PR BODY MASS INDEX (BMI) DOCUMENTED: ICD-10-PCS | Mod: CPTII,S$GLB,, | Performed by: STUDENT IN AN ORGANIZED HEALTH CARE EDUCATION/TRAINING PROGRAM

## 2023-06-19 PROCEDURE — 1160F RVW MEDS BY RX/DR IN RCRD: CPT | Mod: CPTII,S$GLB,, | Performed by: STUDENT IN AN ORGANIZED HEALTH CARE EDUCATION/TRAINING PROGRAM

## 2023-06-19 PROCEDURE — 99999 PR PBB SHADOW E&M-EST. PATIENT-LVL III: ICD-10-PCS | Mod: PBBFAC,,, | Performed by: STUDENT IN AN ORGANIZED HEALTH CARE EDUCATION/TRAINING PROGRAM

## 2023-06-19 PROCEDURE — 3074F SYST BP LT 130 MM HG: CPT | Mod: CPTII,S$GLB,, | Performed by: STUDENT IN AN ORGANIZED HEALTH CARE EDUCATION/TRAINING PROGRAM

## 2023-06-19 PROCEDURE — 3044F HG A1C LEVEL LT 7.0%: CPT | Mod: CPTII,S$GLB,, | Performed by: STUDENT IN AN ORGANIZED HEALTH CARE EDUCATION/TRAINING PROGRAM

## 2023-06-19 PROCEDURE — 3078F DIAST BP <80 MM HG: CPT | Mod: CPTII,S$GLB,, | Performed by: STUDENT IN AN ORGANIZED HEALTH CARE EDUCATION/TRAINING PROGRAM

## 2023-06-19 PROCEDURE — 3074F PR MOST RECENT SYSTOLIC BLOOD PRESSURE < 130 MM HG: ICD-10-PCS | Mod: CPTII,S$GLB,, | Performed by: STUDENT IN AN ORGANIZED HEALTH CARE EDUCATION/TRAINING PROGRAM

## 2023-06-19 PROCEDURE — 1159F MED LIST DOCD IN RCRD: CPT | Mod: CPTII,S$GLB,, | Performed by: STUDENT IN AN ORGANIZED HEALTH CARE EDUCATION/TRAINING PROGRAM

## 2023-06-19 PROCEDURE — 99999 PR PBB SHADOW E&M-EST. PATIENT-LVL III: CPT | Mod: PBBFAC,,, | Performed by: STUDENT IN AN ORGANIZED HEALTH CARE EDUCATION/TRAINING PROGRAM

## 2023-06-19 PROCEDURE — 3044F PR MOST RECENT HEMOGLOBIN A1C LEVEL <7.0%: ICD-10-PCS | Mod: CPTII,S$GLB,, | Performed by: STUDENT IN AN ORGANIZED HEALTH CARE EDUCATION/TRAINING PROGRAM

## 2023-06-19 PROCEDURE — 99213 OFFICE O/P EST LOW 20 MIN: CPT | Mod: S$GLB,,, | Performed by: STUDENT IN AN ORGANIZED HEALTH CARE EDUCATION/TRAINING PROGRAM

## 2023-06-19 PROCEDURE — 1160F PR REVIEW ALL MEDS BY PRESCRIBER/CLIN PHARMACIST DOCUMENTED: ICD-10-PCS | Mod: CPTII,S$GLB,, | Performed by: STUDENT IN AN ORGANIZED HEALTH CARE EDUCATION/TRAINING PROGRAM

## 2023-06-19 NOTE — PROGRESS NOTES
Subjective     Patient ID: Rhea Gilman is a 28 y.o. female.    Chief Complaint: Follow-up and Weight Check    Patient presents for treatment of obesity.     Co-morbidities   Insulin Resistance  Afib converted with CCB in 2017    Negative for thyroid cancer    Weight History  Highest adult weight: 187.3 lbs     History of Weight Loss Efforts  Diethylypropion , last prescribed 9/2022 per   Adipex, last prescribed 7/2021 per     Current Physical Activity  Treadmill  Elliptical      Medical Weight Loss  6/19/2023: 149.1 lbs, BMI 25.6, BFP 37.7 lbs, BFM 56.3 lbs, SMM 50.7 lbs, BMR 1279 kcal    Review of Systems   Constitutional:  Negative for chills and fever.   Respiratory:  Negative for shortness of breath.    Cardiovascular:  Negative for chest pain.   Gastrointestinal:  Negative for abdominal pain, nausea and vomiting.   Neurological:  Negative for dizziness and light-headedness.   Psychiatric/Behavioral:  The patient is not nervous/anxious.         Objective    Latest Reference Range & Units 02/24/23 08:44 06/16/23 14:34   WBC 3.90 - 12.70 K/uL 4.86 8.20   RBC 4.00 - 5.40 M/uL 3.94 (L) 4.33   Hemoglobin 12.0 - 16.0 g/dL 12.2 13.2   Hematocrit 37.0 - 48.5 % 38.3 41.3   MCV 82 - 98 fL 97 95   MCH 27.0 - 31.0 pg 31.0 30.5   MCHC 32.0 - 36.0 g/dL 31.9 (L) 32.0   RDW 11.5 - 14.5 % 13.1 12.5   Platelets 150 - 450 K/uL 343 340   MPV 9.2 - 12.9 fL 9.7 9.9   Gran % 38.0 - 73.0 % 62.9    Lymph % 18.0 - 48.0 % 27.2    Mono % 4.0 - 15.0 % 5.8    Eosinophil % 0.0 - 8.0 % 3.1    Basophil % 0.0 - 1.9 % 0.8    Immature Granulocytes 0.0 - 0.5 % 0.2    Gran # (ANC) 1.8 - 7.7 K/uL 3.1    Lymph # 1.0 - 4.8 K/uL 1.3    Mono # 0.3 - 1.0 K/uL 0.3    Eos # 0.0 - 0.5 K/uL 0.2    Baso # 0.00 - 0.20 K/uL 0.04    Immature Grans (Abs) 0.00 - 0.04 K/uL 0.01    nRBC 0 /100 WBC 0    Differential Method  Automated    Iron 30 - 160 ug/dL  86   TIBC 250 - 450 ug/dL  413   Saturated Iron 20 - 50 %  21   Transferrin 200 - 375 mg/dL  279    Ferritin 20.0 - 300.0 ng/mL 33 33   Sodium 136 - 145 mmol/L 140    Potassium 3.5 - 5.1 mmol/L 3.8    Chloride 95 - 110 mmol/L 107    CO2 23 - 29 mmol/L 25    Anion Gap 8 - 16 mmol/L 8    BUN 6 - 20 mg/dL 9    Creatinine 0.5 - 1.4 mg/dL 0.7    eGFR >60 mL/min/1.73 m^2 >60.0    Glucose 70 - 110 mg/dL 73    Calcium 8.7 - 10.5 mg/dL 9.0    Magnesium 1.6 - 2.6 mg/dL 1.9    Alkaline Phosphatase 55 - 135 U/L 40 (L)    PROTEIN TOTAL 6.0 - 8.4 g/dL 6.3    Albumin 3.5 - 5.2 g/dL 3.6    BILIRUBIN TOTAL 0.1 - 1.0 mg/dL 0.6    AST 10 - 40 U/L 13    ALT 10 - 44 U/L 9 (L)    Cholesterol 120 - 199 mg/dL 150    HDL 40 - 75 mg/dL 47    HDL/Cholesterol Ratio 20.0 - 50.0 % 31.3    LDL Cholesterol External 63.0 - 159.0 mg/dL 90.2    Non-HDL Cholesterol mg/dL 103    Total Cholesterol/HDL Ratio 2.0 - 5.0  3.2    Triglycerides 30 - 150 mg/dL 64    Hemoglobin A1C External 4.0 - 5.6 % 4.7    Estimated Avg Glucose 68 - 131 mg/dL 88    (L): Data is abnormally low    Vitals:    06/19/23 1502   BP: 111/67   Pulse: 88       Physical Exam  Vitals reviewed.   Constitutional:       General: She is not in acute distress.     Appearance: Normal appearance. She is not ill-appearing, toxic-appearing or diaphoretic.   HENT:      Head: Normocephalic and atraumatic.   Cardiovascular:      Rate and Rhythm: Normal rate.   Pulmonary:      Effort: Pulmonary effort is normal. No respiratory distress.   Skin:     General: Skin is warm and dry.   Neurological:      Mental Status: She is alert and oriented to person, place, and time.          Assessment and Plan     1. Overweight (BMI 25.0-29.9)    2. Encounter for weight loss counseling        - Ozempic 1 mg weekly    - Log all food and beverage intake with a daily calorie goal of 5573-5579 calories per day    - Moderate intensity aerobic exercise plus resistance exercises for 150-300 minutes per week

## 2023-06-20 ENCOUNTER — PATIENT MESSAGE (OUTPATIENT)
Dept: RESEARCH | Facility: HOSPITAL | Age: 28
End: 2023-06-20
Payer: COMMERCIAL

## 2023-06-22 ENCOUNTER — HOSPITAL ENCOUNTER (EMERGENCY)
Facility: OTHER | Age: 28
Discharge: HOME | End: 2023-06-22
Attending: EMERGENCY MEDICINE
Payer: COMMERCIAL

## 2023-06-22 VITALS
DIASTOLIC BLOOD PRESSURE: 65 MMHG | HEIGHT: 64 IN | SYSTOLIC BLOOD PRESSURE: 115 MMHG | BODY MASS INDEX: 25.44 KG/M2 | OXYGEN SATURATION: 100 % | WEIGHT: 149 LBS | HEART RATE: 101 BPM | TEMPERATURE: 99 F | RESPIRATION RATE: 16 BRPM

## 2023-06-22 DIAGNOSIS — R11.0 NAUSEA: ICD-10-CM

## 2023-06-22 DIAGNOSIS — R53.1 WEAKNESS: ICD-10-CM

## 2023-06-22 DIAGNOSIS — R53.83 FATIGUE, UNSPECIFIED TYPE: Primary | ICD-10-CM

## 2023-06-22 LAB
ABO + RH BLD: NORMAL
ALBUMIN SERPL BCP-MCNC: 4 G/DL (ref 3.5–5.2)
ALP SERPL-CCNC: 48 U/L (ref 55–135)
ALT SERPL W/O P-5'-P-CCNC: 11 U/L (ref 10–44)
ANION GAP SERPL CALC-SCNC: 12 MMOL/L (ref 8–16)
AST SERPL-CCNC: 16 U/L (ref 10–40)
B-HCG UR QL: NEGATIVE
BASOPHILS # BLD AUTO: 0.04 K/UL (ref 0–0.2)
BASOPHILS NFR BLD: 0.6 % (ref 0–1.9)
BILIRUB SERPL-MCNC: 0.5 MG/DL (ref 0.1–1)
BILIRUB UR QL STRIP: NEGATIVE
BLD GP AB SCN CELLS X3 SERPL QL: NORMAL
BUN SERPL-MCNC: 12 MG/DL (ref 6–20)
CALCIUM SERPL-MCNC: 9.5 MG/DL (ref 8.7–10.5)
CHLORIDE SERPL-SCNC: 103 MMOL/L (ref 95–110)
CLARITY UR: CLEAR
CO2 SERPL-SCNC: 24 MMOL/L (ref 23–29)
COLOR UR: COLORLESS
CREAT SERPL-MCNC: 0.8 MG/DL (ref 0.5–1.4)
CREAT SERPL-MCNC: 0.9 MG/DL (ref 0.5–1.4)
CTP QC/QA: YES
DIFFERENTIAL METHOD: NORMAL
EOSINOPHIL # BLD AUTO: 0.2 K/UL (ref 0–0.5)
EOSINOPHIL NFR BLD: 3.2 % (ref 0–8)
ERYTHROCYTE [DISTWIDTH] IN BLOOD BY AUTOMATED COUNT: 12.1 % (ref 11.5–14.5)
EST. GFR  (NO RACE VARIABLE): >60 ML/MIN/1.73 M^2
GLUCOSE SERPL-MCNC: 78 MG/DL (ref 70–110)
GLUCOSE UR QL STRIP: NEGATIVE
HCT VFR BLD AUTO: 45.9 % (ref 37–48.5)
HCV AB SERPL QL IA: NEGATIVE
HGB BLD-MCNC: 15.2 G/DL (ref 12–16)
HGB UR QL STRIP: NEGATIVE
HIV 1+2 AB+HIV1 P24 AG SERPL QL IA: NEGATIVE
IMM GRANULOCYTES # BLD AUTO: 0.02 K/UL (ref 0–0.04)
IMM GRANULOCYTES NFR BLD AUTO: 0.3 % (ref 0–0.5)
KETONES UR QL STRIP: NEGATIVE
LDH SERPL L TO P-CCNC: 1.34 MMOL/L (ref 0.5–2.2)
LEUKOCYTE ESTERASE UR QL STRIP: NEGATIVE
LYMPHOCYTES # BLD AUTO: 2.4 K/UL (ref 1–4.8)
LYMPHOCYTES NFR BLD: 34 % (ref 18–48)
MAGNESIUM SERPL-MCNC: 2.1 MG/DL (ref 1.6–2.6)
MCH RBC QN AUTO: 30.7 PG (ref 27–31)
MCHC RBC AUTO-ENTMCNC: 33.1 G/DL (ref 32–36)
MCV RBC AUTO: 93 FL (ref 82–98)
MONOCYTES # BLD AUTO: 0.5 K/UL (ref 0.3–1)
MONOCYTES NFR BLD: 6.9 % (ref 4–15)
NEUTROPHILS # BLD AUTO: 3.8 K/UL (ref 1.8–7.7)
NEUTROPHILS NFR BLD: 55 % (ref 38–73)
NITRITE UR QL STRIP: NEGATIVE
NRBC BLD-RTO: 0 /100 WBC
PH UR STRIP: 6 [PH] (ref 5–8)
PHOSPHATE SERPL-MCNC: 3.2 MG/DL (ref 2.7–4.5)
PLATELET # BLD AUTO: 316 K/UL (ref 150–450)
PMV BLD AUTO: 9.3 FL (ref 9.2–12.9)
POTASSIUM SERPL-SCNC: 3.8 MMOL/L (ref 3.5–5.1)
PROT SERPL-MCNC: 7.7 G/DL (ref 6–8.4)
PROT UR QL STRIP: NEGATIVE
RBC # BLD AUTO: 4.95 M/UL (ref 4–5.4)
SAMPLE: NORMAL
SAMPLE: NORMAL
SODIUM SERPL-SCNC: 139 MMOL/L (ref 136–145)
SP GR UR STRIP: 1 (ref 1–1.03)
SPECIMEN OUTDATE: NORMAL
TSH SERPL DL<=0.005 MIU/L-ACNC: 0.53 UIU/ML (ref 0.4–4)
URN SPEC COLLECT METH UR: ABNORMAL
UROBILINOGEN UR STRIP-ACNC: NEGATIVE EU/DL
WBC # BLD AUTO: 6.94 K/UL (ref 3.9–12.7)

## 2023-06-22 PROCEDURE — 81025 URINE PREGNANCY TEST: CPT | Performed by: PHYSICIAN ASSISTANT

## 2023-06-22 PROCEDURE — 81003 URINALYSIS AUTO W/O SCOPE: CPT | Performed by: PHYSICIAN ASSISTANT

## 2023-06-22 PROCEDURE — 25000003 PHARM REV CODE 250: Performed by: PHYSICIAN ASSISTANT

## 2023-06-22 PROCEDURE — 83735 ASSAY OF MAGNESIUM: CPT | Performed by: PHYSICIAN ASSISTANT

## 2023-06-22 PROCEDURE — 86900 BLOOD TYPING SEROLOGIC ABO: CPT | Performed by: PHYSICIAN ASSISTANT

## 2023-06-22 PROCEDURE — 84443 ASSAY THYROID STIM HORMONE: CPT | Performed by: PHYSICIAN ASSISTANT

## 2023-06-22 PROCEDURE — 80053 COMPREHEN METABOLIC PANEL: CPT | Performed by: PHYSICIAN ASSISTANT

## 2023-06-22 PROCEDURE — 84100 ASSAY OF PHOSPHORUS: CPT | Performed by: PHYSICIAN ASSISTANT

## 2023-06-22 PROCEDURE — 96374 THER/PROPH/DIAG INJ IV PUSH: CPT

## 2023-06-22 PROCEDURE — 86803 HEPATITIS C AB TEST: CPT | Performed by: EMERGENCY MEDICINE

## 2023-06-22 PROCEDURE — 85025 COMPLETE CBC W/AUTO DIFF WBC: CPT | Performed by: PHYSICIAN ASSISTANT

## 2023-06-22 PROCEDURE — 99284 EMERGENCY DEPT VISIT MOD MDM: CPT | Mod: 25

## 2023-06-22 PROCEDURE — 63600175 PHARM REV CODE 636 W HCPCS: Performed by: PHYSICIAN ASSISTANT

## 2023-06-22 PROCEDURE — 87389 HIV-1 AG W/HIV-1&-2 AB AG IA: CPT | Performed by: EMERGENCY MEDICINE

## 2023-06-22 RX ORDER — ONDANSETRON 2 MG/ML
4 INJECTION INTRAMUSCULAR; INTRAVENOUS
Status: COMPLETED | OUTPATIENT
Start: 2023-06-22 | End: 2023-06-22

## 2023-06-22 RX ADMIN — ONDANSETRON 4 MG: 2 INJECTION INTRAMUSCULAR; INTRAVENOUS at 06:06

## 2023-06-22 RX ADMIN — SODIUM CHLORIDE 1000 ML: 9 INJECTION, SOLUTION INTRAVENOUS at 07:06

## 2023-06-22 NOTE — Clinical Note
"Rhea "Angela Gilman was seen and treated in our emergency department on 6/22/2023.  She may return to work on 06/23/2023.       If you have any questions or concerns, please don't hesitate to call.      LAURA Almaraz"

## 2023-06-22 NOTE — ED TRIAGE NOTES
28 YOF presents to ED with c/o fatigue, chills, nausea and 1 episode of vomiting x 1 wk with no improvement. PMH Anemia only. Currently taking multi vit and iron supplement. A&Ox4. Denies any other complaints.     LOC: The patient is awake, alert and aware of environment with an appropriate affect, the patient is oriented x 3.  APPEARANCE: Patient resting comfortably and in no acute distress, patient is clean and well groomed, patient's clothing is properly fastened.  SKIN: The skin is warm and dry, patient has normal skin turgor and moist mucus membranes, skin intact, no breakdown or brusing noted.  MUSKULOSKELETAL: Patient moving all extremities well, no obvious swelling or deformities noted.  RESPIRATORY: Airway is open and patent, respirations are spontaneous, patient has a normal effort and rate.  CARDIAC: No peripheral edema.  ABDOMEN: Soft and no tenderness to palpation, no distention noted.     ED workup in progress. VSS. Safety measures in place; side rails up x2. Call light within pt reach. Will continue to monitor.

## 2023-06-23 ENCOUNTER — TELEPHONE (OUTPATIENT)
Dept: PRIMARY CARE CLINIC | Facility: CLINIC | Age: 28
End: 2023-06-23
Payer: COMMERCIAL

## 2023-06-23 NOTE — ED PROVIDER NOTES
Encounter Date: 6/22/2023       History     Chief Complaint   Patient presents with    Fatigue     Pt reports generalized weakness x 1 week with one episode of vomiting; pt states she was told she was anemic in the past, pt denies any current bleeding     28 y.o. female with PMH of anemia, atrial fibrillation with no anticoagulant therapy, migraine headaches and anxiety presents the ED for evaluation of multiple complaints.  She reports for the past some time she is had some generalized weakness and fatigue.  She states decreased energy levels.  States that she has some occasional nausea and had 1 episode of vomiting throughout this week.  Denies any URI symptoms.  Denies any fever.  Denies any chest pain or shortness of breath.  Denies any focal abdominal pain.  Denies any sleep disturbance.  Denies any arthralgias.  Does report she recently initiated BuSpar and re-initiated Ozempi however endorses did began before this.    The history is provided by the patient.   Review of patient's allergies indicates:   Allergen Reactions    Amoxicillin Hives     Past Medical History:   Diagnosis Date    Anemia     Anxiety     Atrial fibrillation 2017    converted with calcium channel blockers at [facility]    Migraine headache      Past Surgical History:   Procedure Laterality Date    AUGMENTATION OF BREAST Bilateral 01/06/2022    Procedure: MAMMOPLASTY, AUGMENTATION;  Surgeon: Peterson Schumacher MD;  Location: [facility] OR;  Service: Plastics;  Laterality: Bilateral;    HYSTEROSCOPY WITH DILATION AND CURETTAGE OF UTERUS N/A 2/6/2023    Procedure: HYSTEROSCOPY, WITH DILATION AND CURETTAGE OF UTERUS;  Surgeon: Juan C Alvarenga MD;  Location: [facility] OR;  Service: OB/GYN;  Laterality: N/A;    VAGINAL DELIVERY  2017     Family History   Problem Relation Age of Onset    Breast cancer Maternal Grandmother     Diabetes Mother     Kidney disease Mother     Vision loss Mother     Colon cancer Neg Hx     Ovarian cancer Neg Hx      Social  History     Tobacco Use    Smoking status: Former     Packs/day: 0.00     Years: 0.00     Pack years: 0.00     Types: Cigarettes     Quit date: 3/18/2021     Years since quittin.2    Smokeless tobacco: Never    Tobacco comments:     1 pack 3 days   Substance Use Topics    Alcohol use: No    Drug use: Not Currently     Review of Systems  See HPI  Physical Exam     Initial Vitals [23 1730]   BP Pulse Resp Temp SpO2   117/73 (!) 116 20 98.1 °F (36.7 °C) 100 %      MAP       --         Vitals:    23 2045   BP: 115/65   Pulse: 101   Resp: 16   Temp: 98.5 °F (36.9 °C)       Physical Exam    Nursing note and vitals reviewed.  Constitutional: Vital signs are normal. She appears well-developed and well-nourished. She is cooperative.  Non-toxic appearance. She does not appear ill. No distress.   HENT:   Head: Normocephalic and atraumatic.   Eyes: Conjunctivae and lids are normal.   Neck: Neck supple.   Cardiovascular:  Regular rhythm.   Tachycardia present.         Pulmonary/Chest: Breath sounds normal. No respiratory distress. She has no wheezes. She has no rhonchi.   Abdominal: Abdomen is soft. Bowel sounds are normal. There is no abdominal tenderness. There is no rigidity and no guarding.   Musculoskeletal:         General: Normal range of motion.      Cervical back: Neck supple. No rigidity.     Neurological: She is alert and oriented to person, place, and time. GCS eye subscore is 4. GCS verbal subscore is 5. GCS motor subscore is 6.   Skin: Skin is warm, dry and intact. No rash noted. No pallor.   Psychiatric: She has a normal mood and affect. Her speech is normal and behavior is normal. Thought content normal.       ED Course   Procedures  Labs Reviewed   COMPREHENSIVE METABOLIC PANEL - Abnormal; Notable for the following components:       Result Value    Alkaline Phosphatase 48 (*)     All other components within normal limits    Narrative:     Release to patient->Immediate   URINALYSIS, REFLEX TO  URINE CULTURE - Abnormal; Notable for the following components:    Color, UA Colorless (*)     All other components within normal limits    Narrative:     Specimen Source->Urine   HIV 1 / 2 ANTIBODY    Narrative:     Release to patient->Immediate   HEPATITIS C ANTIBODY    Narrative:     Release to patient->Immediate   CBC W/ AUTO DIFFERENTIAL    Narrative:     Release to patient->Immediate   TSH   MAGNESIUM   PHOSPHORUS   POCT URINE PREGNANCY   TYPE & SCREEN   ISTAT CREATININE   ISTAT LACTATE          Imaging Results    None          Medications   ondansetron injection 4 mg (4 mg Intravenous Given 6/22/23 1807)   sodium chloride 0.9% bolus 1,000 mL 1,000 mL (1,000 mLs Intravenous New Bag 6/22/23 1912)     Medical Decision Making:   History:   Old Medical Records: I decided to obtain old medical records.  Initial Assessment:   Emergent evaluation 20-year-old female with ongoing fatigue and generalized malaise.  Initially reported for 1 week however reported some symptoms ongoing prior to this.  Denies any associated chest pain, palpitations or shortness of breath.  Does report a history of anemia and was concerned about this being contributory.  Denies any active bleeding.  She appears well in no significant distress.  Initially has some tachycardia.  This did improve while at rest.    Clinical Tests:   Lab Tests: Reviewed and Ordered  ED Management:  Plan for labs and IV fluids although did review recent labs on 06/16 with no significant anemia or abnormalities at that time.  Discussed with patient will assess for any acute process although suspect concomitant etiology of the multiple complaints.  Did note her tachycardia however suspect related to Vyvanse as she took that today and did not take beta-blocker.  This did improve during interview and at rest.  Also improved more markedly after 1 L of IV fluids.  Labs with no significant abnormalities.  Did not feel additional workup warranted at this time as patient  denies any palpitations, chest pain or shortness of breath hence low suspicion of arrhythmia at this time or PE at this time. Strict instructions to follow up with primary care physician or reference provided for further assessment and evaluation. Given instructions to return for any acute symptoms and verbalized understanding of this medical plan.\                        Clinical Impression:   Final diagnoses:  [R53.83] Fatigue, unspecified type (Primary)  [R53.1] Weakness  [R11.0] Nausea        ED Disposition Condition    Discharge Stable          ED Prescriptions    None       Follow-up Information       Follow up With Specialties Details Why Contact Info    Keshav Ni MD Internal Medicine Schedule an appointment as soon as possible for a visit   [address]  [place] [address]  [phone]      [facility] - Emergency Dept Emergency Medicine  If symptoms worsen [address] [facility] [facility] [address] [phone]             LAURA Almaraz  06/24/23 1414
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
## 2023-06-26 ENCOUNTER — TELEPHONE (OUTPATIENT)
Dept: PRIMARY CARE CLINIC | Facility: CLINIC | Age: 28
End: 2023-06-26
Payer: COMMERCIAL

## 2023-06-26 NOTE — TELEPHONE ENCOUNTER
Called pt to schedule hospital visit f/u appt with Dr. Ni no answer unable to LVM voicemail full

## 2023-07-05 ENCOUNTER — OFFICE VISIT (OUTPATIENT)
Dept: OPHTHALMOLOGY | Facility: CLINIC | Age: 28
End: 2023-07-05
Payer: COMMERCIAL

## 2023-07-05 ENCOUNTER — PATIENT MESSAGE (OUTPATIENT)
Dept: RESEARCH | Facility: HOSPITAL | Age: 28
End: 2023-07-05
Payer: COMMERCIAL

## 2023-07-05 DIAGNOSIS — T15.11XA FOREIGN BODY OF RIGHT CONJUNCTIVA, INITIAL ENCOUNTER: Primary | ICD-10-CM

## 2023-07-05 PROCEDURE — 92002 INTRM OPH EXAM NEW PATIENT: CPT | Mod: 25,S$GLB,, | Performed by: OPHTHALMOLOGY

## 2023-07-05 PROCEDURE — 3044F PR MOST RECENT HEMOGLOBIN A1C LEVEL <7.0%: ICD-10-PCS | Mod: CPTII,S$GLB,, | Performed by: OPHTHALMOLOGY

## 2023-07-05 PROCEDURE — 99999 PR PBB SHADOW E&M-EST. PATIENT-LVL III: ICD-10-PCS | Mod: PBBFAC,,, | Performed by: OPHTHALMOLOGY

## 2023-07-05 PROCEDURE — 1159F MED LIST DOCD IN RCRD: CPT | Mod: CPTII,S$GLB,, | Performed by: OPHTHALMOLOGY

## 2023-07-05 PROCEDURE — 92002 PR EYE EXAM, NEW PATIENT,INTERMED: ICD-10-PCS | Mod: 25,S$GLB,, | Performed by: OPHTHALMOLOGY

## 2023-07-05 PROCEDURE — 65210 PR REMV F.B.,EYE,EMBED CONJUNC: ICD-10-PCS | Mod: RT,S$GLB,, | Performed by: OPHTHALMOLOGY

## 2023-07-05 PROCEDURE — 3044F HG A1C LEVEL LT 7.0%: CPT | Mod: CPTII,S$GLB,, | Performed by: OPHTHALMOLOGY

## 2023-07-05 PROCEDURE — 65210 REMOVE FOREIGN BODY FROM EYE: CPT | Mod: RT,S$GLB,, | Performed by: OPHTHALMOLOGY

## 2023-07-05 PROCEDURE — 99999 PR PBB SHADOW E&M-EST. PATIENT-LVL III: CPT | Mod: PBBFAC,,, | Performed by: OPHTHALMOLOGY

## 2023-07-05 PROCEDURE — 1159F PR MEDICATION LIST DOCUMENTED IN MEDICAL RECORD: ICD-10-PCS | Mod: CPTII,S$GLB,, | Performed by: OPHTHALMOLOGY

## 2023-07-05 RX ORDER — OFLOXACIN 3 MG/ML
1 SOLUTION/ DROPS OPHTHALMIC 4 TIMES DAILY
Qty: 5 ML | Refills: 1 | Status: SHIPPED | OUTPATIENT
Start: 2023-07-05 | End: 2023-11-09

## 2023-07-05 RX ORDER — TOPIRAMATE 25 MG/1
TABLET ORAL
COMMUNITY
Start: 2023-02-24 | End: 2023-10-02
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
## 2023-07-06 ENCOUNTER — TELEPHONE (OUTPATIENT)
Dept: OBSTETRICS AND GYNECOLOGY | Facility: CLINIC | Age: 28
End: 2023-07-06
Payer: COMMERCIAL

## 2023-07-11 ENCOUNTER — PATIENT MESSAGE (OUTPATIENT)
Dept: RESEARCH | Facility: HOSPITAL | Age: 28
End: 2023-07-11
Payer: COMMERCIAL

## 2023-07-24 RX ORDER — LISDEXAMFETAMINE DIMESYLATE 30 MG/1
30 CAPSULE ORAL EVERY MORNING
Qty: 30 CAPSULE | Refills: 0 | Status: SHIPPED | OUTPATIENT
Start: 2023-07-24 | End: 2023-08-24 | Stop reason: SDUPTHER

## 2023-08-24 RX ORDER — LISDEXAMFETAMINE DIMESYLATE 30 MG/1
30 CAPSULE ORAL EVERY MORNING
Qty: 30 CAPSULE | Refills: 0 | Status: SHIPPED | OUTPATIENT
Start: 2023-08-24 | End: 2023-09-27 | Stop reason: SDUPTHER

## 2023-08-24 RX ORDER — LISDEXAMFETAMINE DIMESYLATE 30 MG/1
30 CAPSULE ORAL EVERY MORNING
Qty: 30 CAPSULE | Refills: 0 | Status: SHIPPED | OUTPATIENT
Start: 2023-08-24 | End: 2023-08-24

## 2023-09-08 DIAGNOSIS — E66.3 OVERWEIGHT (BMI 25.0-29.9): ICD-10-CM

## 2023-09-08 DIAGNOSIS — E88.819 INSULIN RESISTANCE: ICD-10-CM

## 2023-09-08 RX ORDER — SEMAGLUTIDE 1.34 MG/ML
1 INJECTION, SOLUTION SUBCUTANEOUS
Qty: 3 ML | Refills: 2 | Status: SHIPPED | OUTPATIENT
Start: 2023-09-08 | End: 2023-12-04 | Stop reason: SDUPTHER

## 2023-09-20 ENCOUNTER — OFFICE VISIT (OUTPATIENT)
Dept: INTERNAL MEDICINE | Facility: CLINIC | Age: 28
End: 2023-09-20
Payer: COMMERCIAL

## 2023-09-20 VITALS
OXYGEN SATURATION: 99 % | BODY MASS INDEX: 24.32 KG/M2 | HEIGHT: 64 IN | WEIGHT: 142.44 LBS | HEART RATE: 112 BPM | SYSTOLIC BLOOD PRESSURE: 112 MMHG | DIASTOLIC BLOOD PRESSURE: 62 MMHG

## 2023-09-20 DIAGNOSIS — Z00.00 PREVENTATIVE HEALTH CARE: Primary | ICD-10-CM

## 2023-09-20 PROCEDURE — 3078F DIAST BP <80 MM HG: CPT | Mod: CPTII,S$GLB,, | Performed by: INTERNAL MEDICINE

## 2023-09-20 PROCEDURE — 3044F HG A1C LEVEL LT 7.0%: CPT | Mod: CPTII,S$GLB,, | Performed by: INTERNAL MEDICINE

## 2023-09-20 PROCEDURE — 3074F PR MOST RECENT SYSTOLIC BLOOD PRESSURE < 130 MM HG: ICD-10-PCS | Mod: CPTII,S$GLB,, | Performed by: INTERNAL MEDICINE

## 2023-09-20 PROCEDURE — 3078F PR MOST RECENT DIASTOLIC BLOOD PRESSURE < 80 MM HG: ICD-10-PCS | Mod: CPTII,S$GLB,, | Performed by: INTERNAL MEDICINE

## 2023-09-20 PROCEDURE — 3008F PR BODY MASS INDEX (BMI) DOCUMENTED: ICD-10-PCS | Mod: CPTII,S$GLB,, | Performed by: INTERNAL MEDICINE

## 2023-09-20 PROCEDURE — 3044F PR MOST RECENT HEMOGLOBIN A1C LEVEL <7.0%: ICD-10-PCS | Mod: CPTII,S$GLB,, | Performed by: INTERNAL MEDICINE

## 2023-09-20 PROCEDURE — 3074F SYST BP LT 130 MM HG: CPT | Mod: CPTII,S$GLB,, | Performed by: INTERNAL MEDICINE

## 2023-09-20 PROCEDURE — 99024 POSTOP FOLLOW-UP VISIT: CPT | Mod: S$GLB,,, | Performed by: INTERNAL MEDICINE

## 2023-09-20 PROCEDURE — 99999 PR PBB SHADOW E&M-EST. PATIENT-LVL III: CPT | Mod: PBBFAC,,, | Performed by: INTERNAL MEDICINE

## 2023-09-20 PROCEDURE — 99024 PR POST-OP FOLLOW-UP VISIT: ICD-10-PCS | Mod: S$GLB,,, | Performed by: INTERNAL MEDICINE

## 2023-09-20 PROCEDURE — 3008F BODY MASS INDEX DOCD: CPT | Mod: CPTII,S$GLB,, | Performed by: INTERNAL MEDICINE

## 2023-09-20 PROCEDURE — 1159F PR MEDICATION LIST DOCUMENTED IN MEDICAL RECORD: ICD-10-PCS | Mod: CPTII,S$GLB,, | Performed by: INTERNAL MEDICINE

## 2023-09-20 PROCEDURE — 99999 PR PBB SHADOW E&M-EST. PATIENT-LVL III: ICD-10-PCS | Mod: PBBFAC,,, | Performed by: INTERNAL MEDICINE

## 2023-09-20 PROCEDURE — 1159F MED LIST DOCD IN RCRD: CPT | Mod: CPTII,S$GLB,, | Performed by: INTERNAL MEDICINE

## 2023-09-27 RX ORDER — LISDEXAMFETAMINE DIMESYLATE 30 MG/1
30 CAPSULE ORAL EVERY MORNING
Qty: 30 CAPSULE | Refills: 0 | Status: SHIPPED | OUTPATIENT
Start: 2023-09-27 | End: 2023-10-02 | Stop reason: SDUPTHER

## 2023-09-27 NOTE — PROGRESS NOTES
The patient location is: work  The chief complaint leading to consultation is: AUB    Visit type: audiovisual    Face to Face time with patient: 20 minutes of total time spent on the encounter, which includes face to face time and non-face to face time preparing to see the patient (eg, review of tests), Obtaining and/or reviewing separately obtained history, Documenting clinical information in the electronic or other health record, Independently interpreting results (not separately reported) and communicating results to the patient/family/caregiver, or Care coordination (not separately reported).         Each patient to whom he or she provides medical services by telemedicine is:  (1) informed of the relationship between the physician and patient and the respective role of any other health care provider with respect to management of the patient; and (2) notified that he or she may decline to receive medical services by telemedicine and may withdraw from such care at any time.    Notes:   Pt is 27 y.o. with LMP approx 2 weeks ago here to discuss her AUB.  Delivery in 2017, she is been having some abnormal bleeding.  One Dr. Proposed that she may have had some placental tissue left.  She has recently seen Dr. Carter and an MRI was performed rule out adenomyosis.  The MRI is with the results as follows.    FINDINGS:  Uterus measures 8.8 x 4.1 x 4.2 cm.  Junctional zone measures 10 mm in thickness.  Parenchyma is homogeneous.  Endometrium measures approximately 1.4 cm in thickness.     Right ovary measures 3.9 x 2.4 cm.  Left ovary measures 3.3 x 1.5 cm.  No suspicious ovarian or adnexal mass.     No significant free fluid in the pelvis.     Bowel loops are normal in caliber.  Urinary bladder is under distended, though otherwise has an unremarkable appearance.     Regional skeleton is intact.     Impression:     Unremarkable MRI of the female pelvis.  I see no MRI evidence for adenomyosis.    Patient was recently  restarted on birth control pills.  But she feels that she continues to have abnormal bleeding and an odor.  She is wanting to talk about the possibility of performing a D&C.  We discussed how a D&C could remove the tissue that is within the uterus.  We would send it for pathologic evaluation.  But that tissue will grow back as it would every month.  But given that she is failed other therapies up to this point including birth control pills, anti-inflammatories, it is not unreasonable to consider this.  She is aware that there could be a risk of scar tissue after this.  All questions were answered.  We will reach out to my nurse to help coordinate a hysteroscopy D&C.   28

## 2023-10-02 ENCOUNTER — PATIENT MESSAGE (OUTPATIENT)
Dept: PRIMARY CARE CLINIC | Facility: CLINIC | Age: 28
End: 2023-10-02

## 2023-10-02 ENCOUNTER — OFFICE VISIT (OUTPATIENT)
Dept: PRIMARY CARE CLINIC | Facility: CLINIC | Age: 28
End: 2023-10-02
Payer: COMMERCIAL

## 2023-10-02 VITALS
TEMPERATURE: 98 F | HEART RATE: 89 BPM | RESPIRATION RATE: 18 BRPM | BODY MASS INDEX: 24.4 KG/M2 | WEIGHT: 142.94 LBS | DIASTOLIC BLOOD PRESSURE: 68 MMHG | HEIGHT: 64 IN | OXYGEN SATURATION: 100 % | SYSTOLIC BLOOD PRESSURE: 102 MMHG

## 2023-10-02 DIAGNOSIS — F90.1 ATTENTION DEFICIT HYPERACTIVITY DISORDER (ADHD), PREDOMINANTLY HYPERACTIVE TYPE: Primary | ICD-10-CM

## 2023-10-02 DIAGNOSIS — G43.009 MIGRAINE WITHOUT AURA AND WITHOUT STATUS MIGRAINOSUS, NOT INTRACTABLE: ICD-10-CM

## 2023-10-02 PROCEDURE — 99499 NO LOS: ICD-10-PCS | Mod: S$GLB,,, | Performed by: INTERNAL MEDICINE

## 2023-10-02 PROCEDURE — 99499 UNLISTED E&M SERVICE: CPT | Mod: S$GLB,,, | Performed by: INTERNAL MEDICINE

## 2023-10-02 RX ORDER — LISDEXAMFETAMINE DIMESYLATE 30 MG/1
30 CAPSULE ORAL EVERY MORNING
Qty: 30 CAPSULE | Refills: 0 | Status: SHIPPED | OUTPATIENT
Start: 2023-10-02 | End: 2023-11-06

## 2023-10-02 RX ORDER — LISDEXAMFETAMINE DIMESYLATE 30 MG/1
30 CAPSULE ORAL EVERY MORNING
Qty: 30 CAPSULE | Refills: 0 | Status: SHIPPED | OUTPATIENT
Start: 2023-10-02 | End: 2023-10-02

## 2023-10-02 NOTE — PROGRESS NOTES
28-year-old woman with a history of dysrhythmia/palpitations, migraines, reflux, ADHD, and weight issues here for follow-up.    Pertinent review of systems:  Migraines:  Patient has rare headaches.  Patient is taking no medication now to prevent the headache but only uses ibuprofen 800 mg and a half of a Tylenol regular strength which seems to resolve the problem.  Patient notes that she uses this only approximately 3 times a month or less than 1 time a week.  There is no rebound on that combination.  Headaches are associated with nausea and occasionally her left eye gets blurred.  She notes no change in the pattern of the migraine, the intensity of the migraine or duration of the migraine.      ADHD:  The patient is on Vyvanse and she is doing better.  She he is working with the therapist.  The patient has had no palpitations on this medication.  She also has had no tremor.  She does note occasional insomnia and she is tired.    History of elevated BMI:  Patient has been on Ozempic in his lost 56 lb.  Lb she currently has no symptoms of diabetes.  She has discontinued the metformin.    Remaining review of systems is otherwise negative.    Physical Exam  Vitals and nursing note reviewed.   Constitutional:       General: She is not in acute distress.     Appearance: Normal appearance. She is not ill-appearing.   HENT:      Head: Atraumatic.      Nose: Nose normal.      Mouth/Throat:      Mouth: Mucous membranes are moist.      Pharynx: Oropharynx is clear.   Eyes:      General: No scleral icterus.     Extraocular Movements: Extraocular movements intact.      Conjunctiva/sclera: Conjunctivae normal.      Pupils: Pupils are equal, round, and reactive to light.      Comments: Fundi benign without exudate hemorrhage or scar.   Cardiovascular:      Rate and Rhythm: Normal rate and regular rhythm.      Pulses: Normal pulses.      Heart sounds: Normal heart sounds. No murmur heard.     No gallop.   Pulmonary:      Effort:  Pulmonary effort is normal. No respiratory distress.      Breath sounds: Normal breath sounds. No wheezing, rhonchi or rales.   Abdominal:      General: Bowel sounds are normal. There is no distension.      Palpations: Abdomen is soft. There is no mass.      Tenderness: There is no abdominal tenderness.      Comments: No hepatosplenomegaly   Musculoskeletal:         General: No swelling or tenderness. Normal range of motion.      Cervical back: Neck supple. No tenderness.      Right lower leg: No edema.      Left lower leg: No edema.   Lymphadenopathy:      Cervical: No cervical adenopathy.   Skin:     General: Skin is warm.      Coloration: Skin is not jaundiced or pale.      Findings: No rash.   Neurological:      General: No focal deficit present.      Mental Status: She is alert.   Psychiatric:         Mood and Affect: Mood normal.         Behavior: Behavior normal.      Assessment/plan:  Migraine:  Migraines are stable and she has a headache less than once a week.  She is not interested in prophylaxis and therefore Topamax was stopped.  She is satisfied with the results she gets with ibuprofen and nonsustained release Tylenol which she uses without rebound.  There are no worrisome changes in her headache pattern, intensity, or duration.  Plan: Continue present management    History of elevated BMI:  Patient continues on Ozempic.  She has no symptoms of diabetes and rare symptoms associated with obesity including reflux.  Her BMI is now normal.  Plan:  The patient follows with another provider for the Ozempic    ADHD:  The patient is seeing a therapist.  She is working on skills which can improve her current situation as a way to hopefully in the future decrease the amount of medication.  Plan:  Vyvanse was refilled.  For the patient has occasional insomnia was suggest that she try the BusPar..

## 2023-10-05 ENCOUNTER — OFFICE VISIT (OUTPATIENT)
Dept: OBSTETRICS AND GYNECOLOGY | Facility: CLINIC | Age: 28
End: 2023-10-05
Payer: COMMERCIAL

## 2023-10-05 VITALS
HEIGHT: 64 IN | SYSTOLIC BLOOD PRESSURE: 128 MMHG | WEIGHT: 138 LBS | DIASTOLIC BLOOD PRESSURE: 80 MMHG | BODY MASS INDEX: 23.56 KG/M2

## 2023-10-05 DIAGNOSIS — N89.8 VAGINAL ODOR: Primary | ICD-10-CM

## 2023-10-05 DIAGNOSIS — R35.0 URINARY FREQUENCY: ICD-10-CM

## 2023-10-05 PROBLEM — Z98.890 S/P DILATION AND CURETTAGE: Status: RESOLVED | Noted: 2023-02-06 | Resolved: 2023-10-05

## 2023-10-05 LAB
BILIRUB UR QL STRIP: NEGATIVE
CLARITY UR REFRACT.AUTO: CLEAR
COLOR UR AUTO: YELLOW
GLUCOSE UR QL STRIP: NEGATIVE
HGB UR QL STRIP: NEGATIVE
KETONES UR QL STRIP: NEGATIVE
LEUKOCYTE ESTERASE UR QL STRIP: ABNORMAL
MICROSCOPIC COMMENT: NORMAL
NITRITE UR QL STRIP: NEGATIVE
PH UR STRIP: 7 [PH] (ref 5–8)
PROT UR QL STRIP: NEGATIVE
RBC #/AREA URNS AUTO: 0 /HPF (ref 0–4)
SP GR UR STRIP: 1.01 (ref 1–1.03)
SQUAMOUS #/AREA URNS AUTO: 1 /HPF
URN SPEC COLLECT METH UR: ABNORMAL
WBC #/AREA URNS AUTO: 1 /HPF (ref 0–5)

## 2023-10-05 PROCEDURE — 3074F PR MOST RECENT SYSTOLIC BLOOD PRESSURE < 130 MM HG: ICD-10-PCS | Mod: CPTII,S$GLB,, | Performed by: OBSTETRICS & GYNECOLOGY

## 2023-10-05 PROCEDURE — 99214 OFFICE O/P EST MOD 30 MIN: CPT | Mod: S$GLB,,, | Performed by: OBSTETRICS & GYNECOLOGY

## 2023-10-05 PROCEDURE — 1159F MED LIST DOCD IN RCRD: CPT | Mod: CPTII,S$GLB,, | Performed by: OBSTETRICS & GYNECOLOGY

## 2023-10-05 PROCEDURE — 1160F PR REVIEW ALL MEDS BY PRESCRIBER/CLIN PHARMACIST DOCUMENTED: ICD-10-PCS | Mod: CPTII,S$GLB,, | Performed by: OBSTETRICS & GYNECOLOGY

## 2023-10-05 PROCEDURE — 1160F RVW MEDS BY RX/DR IN RCRD: CPT | Mod: CPTII,S$GLB,, | Performed by: OBSTETRICS & GYNECOLOGY

## 2023-10-05 PROCEDURE — 3044F PR MOST RECENT HEMOGLOBIN A1C LEVEL <7.0%: ICD-10-PCS | Mod: CPTII,S$GLB,, | Performed by: OBSTETRICS & GYNECOLOGY

## 2023-10-05 PROCEDURE — 3074F SYST BP LT 130 MM HG: CPT | Mod: CPTII,S$GLB,, | Performed by: OBSTETRICS & GYNECOLOGY

## 2023-10-05 PROCEDURE — 3008F BODY MASS INDEX DOCD: CPT | Mod: CPTII,S$GLB,, | Performed by: OBSTETRICS & GYNECOLOGY

## 2023-10-05 PROCEDURE — 3079F DIAST BP 80-89 MM HG: CPT | Mod: CPTII,S$GLB,, | Performed by: OBSTETRICS & GYNECOLOGY

## 2023-10-05 PROCEDURE — 1159F PR MEDICATION LIST DOCUMENTED IN MEDICAL RECORD: ICD-10-PCS | Mod: CPTII,S$GLB,, | Performed by: OBSTETRICS & GYNECOLOGY

## 2023-10-05 PROCEDURE — 3008F PR BODY MASS INDEX (BMI) DOCUMENTED: ICD-10-PCS | Mod: CPTII,S$GLB,, | Performed by: OBSTETRICS & GYNECOLOGY

## 2023-10-05 PROCEDURE — 3044F HG A1C LEVEL LT 7.0%: CPT | Mod: CPTII,S$GLB,, | Performed by: OBSTETRICS & GYNECOLOGY

## 2023-10-05 PROCEDURE — 99214 PR OFFICE/OUTPT VISIT, EST, LEVL IV, 30-39 MIN: ICD-10-PCS | Mod: S$GLB,,, | Performed by: OBSTETRICS & GYNECOLOGY

## 2023-10-05 PROCEDURE — 99999 PR PBB SHADOW E&M-EST. PATIENT-LVL III: CPT | Mod: PBBFAC,,, | Performed by: OBSTETRICS & GYNECOLOGY

## 2023-10-05 PROCEDURE — 99999 PR PBB SHADOW E&M-EST. PATIENT-LVL III: ICD-10-PCS | Mod: PBBFAC,,, | Performed by: OBSTETRICS & GYNECOLOGY

## 2023-10-05 PROCEDURE — 81001 URINALYSIS AUTO W/SCOPE: CPT | Performed by: OBSTETRICS & GYNECOLOGY

## 2023-10-05 PROCEDURE — 3079F PR MOST RECENT DIASTOLIC BLOOD PRESSURE 80-89 MM HG: ICD-10-PCS | Mod: CPTII,S$GLB,, | Performed by: OBSTETRICS & GYNECOLOGY

## 2023-10-05 RX ORDER — METRONIDAZOLE 7.5 MG/G
1 GEL VAGINAL DAILY
Qty: 70 G | Refills: 0 | Status: SHIPPED | OUTPATIENT
Start: 2023-10-05 | End: 2023-10-10

## 2023-10-05 NOTE — PROGRESS NOTES
Subjective:      Patient ID: Rhea Gilman is a 28 y.o. female.    Chief Complaint:  Follow-up      History of Present Illness  Follow-up  Pertinent negatives include no abdominal pain, chest pain, coughing, fatigue, headaches, joint swelling, nausea or rash.     Pt is 28 y.o. who is having continued vaginal odor for last few months.  Patient had a hysteroscopy D&C in 2023.  Since then, her cycles have been regular and predictable.  No further pain since then as well.  But she is most recently been experiencing very bad vaginal odor.  She is used Metrogel p.r.n. and has had some improvement in symptoms.  But she wants to make sure that this is not a vaginal or bladder issue.    GYN & OB History  Patient's last menstrual period was 2023.   Date of Last Pap: No result found    OB History    Para Term  AB Living   1 1 1     1   SAB IAB Ectopic Multiple Live Births         0 1      # Outcome Date GA Lbr Carlitos/2nd Weight Sex Delivery Anes PTL Lv   1 Term 17 39w1d  2.665 kg (5 lb 14 oz) F Vag-Spont EPI  KIMI       Review of Systems  Review of Systems   Constitutional:  Negative for activity change, appetite change and fatigue.   HENT: Negative.  Negative for tinnitus.    Eyes: Negative.    Respiratory:  Negative for cough and shortness of breath.    Cardiovascular:  Negative for chest pain and palpitations.   Gastrointestinal: Negative.  Negative for abdominal pain, blood in stool, constipation, diarrhea and nausea.   Endocrine: Negative.  Negative for hot flashes.   Genitourinary:  Positive for dysuria and vaginal odor. Negative for dysmenorrhea, dyspareunia, frequency, menstrual problem, pelvic pain, vaginal discharge, urinary incontinence and postcoital bleeding.   Musculoskeletal:  Negative for back pain and joint swelling.   Integumentary:  Negative for rash, breast mass, nipple discharge and breast skin changes.   Neurological: Negative.  Negative for headaches.    Hematological: Negative.  Does not bruise/bleed easily.   Psychiatric/Behavioral:  The patient is not nervous/anxious.    Breast: negative.  Negative for mass, nipple discharge and skin changes         Objective:     Physical Exam:   Constitutional: She is oriented to person, place, and time. She appears well-developed and well-nourished. No distress.    HENT:   Head: Normocephalic and atraumatic.    Eyes: Pupils are equal, round, and reactive to light. Conjunctivae and lids are normal.     Cardiovascular:  Normal rate and regular rhythm.      Exam reveals no edema.        Pulmonary/Chest: Effort normal.        Abdominal: Soft. Bowel sounds are normal. She exhibits no distension. There is no abdominal tenderness. There is no rebound and no guarding.     Genitourinary:    Uterus, right adnexa and left adnexa normal.      Pelvic exam was performed with patient supine.   The external female genitalia was normal.   Labial bartholins normal.There is no tenderness or lesion on the right labia. There is no tenderness or lesion on the left labia. Cervix is normal. Right adnexum displays no mass and no tenderness. Left adnexum displays no mass and no tenderness. There is vaginal discharge in the vagina. No rectocele, cystocele or unspecified prolapse of vaginal walls in the vagina. Cervix exhibits no motion tenderness and no discharge. Uterus is not deviated, not fixed and not hosting fibroids. Normal urethral meatus.Urethra findings: no tendernessBladder findings: no bladder tenderness          Musculoskeletal: Normal range of motion and moves all extremeties.       Neurological: She is alert and oriented to person, place, and time. She has normal strength.    Skin: Skin is warm and dry.    Psychiatric: She has a normal mood and affect. Her speech is normal and behavior is normal. Thought content normal. Her mood appears not anxious. She does not exhibit a depressed mood. She expresses no suicidal plans and no homicidal  plans.         Assessment:     1. Vaginal odor    2. Urinary frequency               Plan:     Rhea was seen today for follow-up.    Diagnoses and all orders for this visit:    Vaginal odor  -     boric acid 600 mg vaginal suppository; Insert 1 suppository nightly for 14 days  -     metroNIDAZOLE (METROGEL) 0.75 % (37.5mg/5 gram) vaginal gel; Place 1 applicator vaginally once daily. for 5 days  We would a long discussion about the normal bacteria of the vagina and different ways to treat this.  We would recommend a full course of Metrogel followed by a 2 week course of boric acid to help re acidify the vagina.  This should help clear any resistant BV or yeast that she is been experiencing.  Urinary frequency  -     Urinalysis, Reflex to Urine Culture Urine, Clean Catch    We will check her urine for an infection.  If it is positive, we will notify the patient and call in antibiotics at that time.  Patient was encouraged to stay hydrated and drink plenty of water.

## 2023-10-13 ENCOUNTER — CLINICAL SUPPORT (OUTPATIENT)
Dept: PRIMARY CARE CLINIC | Facility: CLINIC | Age: 28
End: 2023-10-13
Payer: COMMERCIAL

## 2023-10-13 DIAGNOSIS — F90.1 ADHD, HYPERACTIVE-IMPULSIVE TYPE: Primary | ICD-10-CM

## 2023-10-13 PROCEDURE — 90832 PSYTX W PT 30 MINUTES: CPT | Mod: S$GLB,,, | Performed by: SOCIAL WORKER

## 2023-10-13 PROCEDURE — 90832 PR PSYCHOTHERAPY W/PATIENT, 30 MIN: ICD-10-PCS | Mod: S$GLB,,, | Performed by: SOCIAL WORKER

## 2023-10-13 NOTE — PROGRESS NOTES
Individual Psychotherapy (LCSW/PhD)  Rhea Hui Mukul,  10/13/2023    Site:  [facility]         Therapeutic Intervention: Met with patient for individual psychotherapy.    Chief complaint/reason for encounter: attention deficit     Interval history and content of current session: Pt is consulting with PCP to treat ADD with medication. Pt states they feel like they are writing more things down and feel more focused. Pt states they are using calendar more and feel more focused. Pt states they feel like they are more able to communicate and feel like they are able to more clearly communicate.    Pt reports feeling very good and feels like people have noticed a difference.        Treatment plan:  Target symptoms: distractability, lack of focus  Why chosen therapy is appropriate versus another modality: relevant to diagnosis, evidence based practice  Outcome monitoring methods: self-report, checklist/rating scale  Therapeutic intervention type: behavior modifying psychotherapy, supportive psychotherapy    Risk parameters:  Patient reports no suicidal ideation  Patient reports no homicidal ideation  Patient reports no self-injurious behavior  Patient reports no violent behavior    Verbal deficits: None    Patient's response to intervention:  The patient's response to intervention is accepting.    Progress toward goals and other mental status changes:  The patient's progress toward goals is limited.    Diagnosis:   No diagnosis found.    Plan: Pt plans to continue individual psychotherapy    Return to clinic: 2 weeks    Length of Service (minutes): 30      Answers submitted by the patient for this visit:  Review of Systems Questionnaire (Submitted on 10/13/2023)  activity change: No  unexpected weight change: No  neck pain: No  hearing loss: No  rhinorrhea: No  trouble swallowing: No  eye discharge: No  visual disturbance: No  chest tightness: No  wheezing: No  chest pain: No  palpitations: No  blood in stool:  No  constipation: No  vomiting: No  diarrhea: No  polydipsia: No  polyuria: No  difficulty urinating: No  hematuria: No  menstrual problem: No  dysuria: No  joint swelling: No  arthralgias: No  headaches: No  weakness: No  confusion: No  dysphoric mood: No

## 2023-10-19 ENCOUNTER — PATIENT MESSAGE (OUTPATIENT)
Dept: PRIMARY CARE CLINIC | Facility: CLINIC | Age: 28
End: 2023-10-19
Payer: COMMERCIAL

## 2023-10-19 RX ORDER — BUSPIRONE HYDROCHLORIDE 5 MG/1
5 TABLET ORAL 2 TIMES DAILY
Qty: 180 TABLET | Refills: 0 | Status: SHIPPED | OUTPATIENT
Start: 2023-10-19 | End: 2024-01-26

## 2023-10-19 NOTE — PROGRESS NOTES
Spoke to patient because the buspirone was taken out of the medication list.  Ninety days with no refills of buspirone 5 mg twice daily PRN was filled.

## 2023-11-06 ENCOUNTER — PATIENT MESSAGE (OUTPATIENT)
Dept: PRIMARY CARE CLINIC | Facility: CLINIC | Age: 28
End: 2023-11-06
Payer: COMMERCIAL

## 2023-11-06 RX ORDER — DEXTROAMPHETAMINE SACCHARATE, AMPHETAMINE ASPARTATE MONOHYDRATE, DEXTROAMPHETAMINE SULFATE AND AMPHETAMINE SULFATE 7.5; 7.5; 7.5; 7.5 MG/1; MG/1; MG/1; MG/1
30 CAPSULE, EXTENDED RELEASE ORAL EVERY MORNING
Qty: 30 CAPSULE | Refills: 0 | Status: SHIPPED | OUTPATIENT
Start: 2023-11-06 | End: 2023-12-15 | Stop reason: SDUPTHER

## 2023-11-06 NOTE — PROGRESS NOTES
The patient is seeing a therapist.  She is on Vyvanse and doing well however she feels that Adderall would be more effective and per the note I will change the prescription by also will continue to check with her therapist.

## 2023-11-09 ENCOUNTER — HOSPITAL ENCOUNTER (EMERGENCY)
Facility: HOSPITAL | Age: 28
Discharge: HOME | End: 2023-11-09
Attending: EMERGENCY MEDICINE
Payer: COMMERCIAL

## 2023-11-09 VITALS
WEIGHT: 139 LBS | BODY MASS INDEX: 23.73 KG/M2 | SYSTOLIC BLOOD PRESSURE: 111 MMHG | RESPIRATION RATE: 18 BRPM | HEIGHT: 64 IN | TEMPERATURE: 98 F | HEART RATE: 93 BPM | OXYGEN SATURATION: 100 % | DIASTOLIC BLOOD PRESSURE: 57 MMHG

## 2023-11-09 DIAGNOSIS — R11.0 NAUSEA: Primary | ICD-10-CM

## 2023-11-09 LAB
B-HCG UR QL: NEGATIVE
BILIRUB UR QL STRIP: NEGATIVE
CLARITY UR REFRACT.AUTO: CLEAR
COLOR UR AUTO: YELLOW
CTP QC/QA: YES
GLUCOSE UR QL STRIP: NEGATIVE
HGB UR QL STRIP: NEGATIVE
INFLUENZA A, MOLECULAR: NOT DETECTED
INFLUENZA B, MOLECULAR: NOT DETECTED
KETONES UR QL STRIP: ABNORMAL
LEUKOCYTE ESTERASE UR QL STRIP: NEGATIVE
NITRITE UR QL STRIP: NEGATIVE
PH UR STRIP: 7 [PH] (ref 5–8)
PROT UR QL STRIP: NEGATIVE
RSV AG BY MOLECULAR METHOD: NOT DETECTED
SARS-COV-2 RNA RESP QL NAA+PROBE: NOT DETECTED
SP GR UR STRIP: 1.01 (ref 1–1.03)
URN SPEC COLLECT METH UR: ABNORMAL

## 2023-11-09 PROCEDURE — 99283 EMERGENCY DEPT VISIT LOW MDM: CPT

## 2023-11-09 PROCEDURE — 0241U SARS-COV2 (COVID) WITH FLU/RSV BY PCR: CPT

## 2023-11-09 PROCEDURE — 81003 URINALYSIS AUTO W/O SCOPE: CPT

## 2023-11-09 PROCEDURE — 81025 URINE PREGNANCY TEST: CPT

## 2023-11-10 NOTE — ED NOTES
Awake, alert and aware of environment with age appropriate behavior. No acute distress noted. Skin is warm and dry with normal color. Airway is open and patent, respirations are spontaneous, unlabored with normal rate and effort. Abdomen is soft and non distended. Patient is moving all extremities spontaneously. No obvious musculoskeletal deformities noted.

## 2023-11-10 NOTE — DISCHARGE INSTRUCTIONS
Diagnosis: Nausea    Tests you had showed:   Labs Reviewed   URINALYSIS, REFLEX TO URINE CULTURE - Abnormal; Notable for the following components:       Result Value    Ketones, UA 1+ (*)     All other components within normal limits    Narrative:     Specimen Source->Urine   SARS-COV2 (COVID) WITH FLU/RSV BY PCR   POCT URINE PREGNANCY      No orders to display       Treatments you received were:   Medications - No data to display    Home Care Instructions:  - Medications: Continue taking your home medications as prescribed    Follow-Up Plan:  - Follow-up with: Primary care doctor within 3 - 5  days  - Additional testing and/or evaluation will be directed by your primary doctor    Return to the Emergency Department for symptoms including but not limited to: worsening symptoms, severe back pain, shortness of breath or chest pain, vomiting with inability to hold down fluids, blood in vomit or poop, fevers greater than 100.4°F, passing out/fainting/unconsciousness, or other concerning symptoms.

## 2023-11-10 NOTE — ED PROVIDER NOTES
Encounter Date: 2023       History     Chief Complaint   Patient presents with    Abdominal Pain     +nausea, took 4 mg odt zofran 1300 with relief; denies vomiting; +diarrhea yesterday, subjective fever; LMP 10    Headache     28-year-old female with a past medical history of ADHD, anxiety, atrial fibrillation, migraines and obesity, on Ozempic presents with a chief complaint of nausea.  She says that she has had subjective fever as well.  She took a Zofran at home, has not vomited.  She is denying any abdominal pain, vaginal discharge or vaginal bleeding.  She denies any recent trauma.    The history is provided by the patient.     Review of patient's allergies indicates:   Allergen Reactions    Amoxicillin Hives     Past Medical History:   Diagnosis Date    Anemia     Anxiety     Atrial fibrillation 2017    converted with calcium channel blockers at [facility]    Migraine headache      Past Surgical History:   Procedure Laterality Date    AUGMENTATION OF BREAST Bilateral 2022    Procedure: MAMMOPLASTY, AUGMENTATION;  Surgeon: Peterson Schumacher MD;  Location: [facility] OR;  Service: Plastics;  Laterality: Bilateral;    HYSTEROSCOPY WITH DILATION AND CURETTAGE OF UTERUS N/A 2023    Procedure: HYSTEROSCOPY, WITH DILATION AND CURETTAGE OF UTERUS;  Surgeon: Juan C Alvarenga MD;  Location: [facility];  Service: OB/GYN;  Laterality: N/A;    VAGINAL DELIVERY       Family History   Problem Relation Age of Onset    Breast cancer Maternal Grandmother     Diabetes Mother     Kidney disease Mother     Vision loss Mother     Colon cancer Neg Hx     Ovarian cancer Neg Hx      Social History     Tobacco Use    Smoking status: Former     Current packs/day: 0.00     Types: Cigarettes     Quit date: 3/18/2021     Years since quittin.6    Smokeless tobacco: Never    Tobacco comments:     1 pack 3 days   Substance Use Topics    Alcohol use: No    Drug use: Not Currently     Review of Systems    Physical Exam      Initial Vitals [11/09/23 1641]   BP Pulse Resp Temp SpO2   (!) 111/57 93 18 97.9 °F (36.6 °C) 100 %      MAP       --         Physical Exam    Nursing note and vitals reviewed.  Constitutional: She appears well-developed and well-nourished. She is not diaphoretic. No distress.   HENT:   Head: Normocephalic and atraumatic.   Mouth/Throat: Oropharynx is clear and moist.   Eyes: EOM are normal. Pupils are equal, round, and reactive to light.   Neck: Neck supple.   Cardiovascular:  Normal rate, regular rhythm, normal heart sounds and intact distal pulses.           Pulmonary/Chest: Breath sounds normal. She has no wheezes. She has no rhonchi. She has no rales.   Abdominal: Abdomen is soft. She exhibits no distension. There is no abdominal tenderness. There is no rebound and no guarding.   Musculoskeletal:         General: No tenderness or edema. Normal range of motion.      Cervical back: Neck supple.     Neurological: She is alert and oriented to person, place, and time. She has normal strength.   Skin: Skin is warm and dry. Capillary refill takes less than 2 seconds. No rash noted. No erythema. No pallor.   Psychiatric: She has a normal mood and affect. Her behavior is normal. Judgment and thought content normal.         ED Course   Procedures  Labs Reviewed   URINALYSIS, REFLEX TO URINE CULTURE - Abnormal; Notable for the following components:       Result Value    Ketones, UA 1+ (*)     All other components within normal limits    Narrative:     Specimen Source->Urine   SARS-COV2 (COVID) WITH FLU/RSV BY PCR   POCT URINE PREGNANCY          Imaging Results    None          Medications - No data to display  Medical Decision Making  See ED course for remainder of care    Amount and/or Complexity of Data Reviewed  Labs: ordered. Decision-making details documented in ED Course.               ED Course as of 11/09/23 1950   Thu Nov 09, 2023   1715 28-year-old female in no acute distress.  Patient is afebrile, very  well-appearing and nontoxic.  Differential includes but is not limited to URI versus pregnancy versus medication side effect [BP]   1746 Preg Test, Ur: Negative [BP]   1843 NITRITE UA: Negative [BP]   1843 Leukocytes, UA: Negative [BP]   1936 SARS-CoV2 (COVID-19) Qualitative PCR: Not Detected [BP]   1936 Influenza A, Molecular: Not Detected [BP]   1936 Influenza B, Molecular: Not Detected [BP]   1936 RSV Ag by Molecular Method: Not Detected [BP]   1947 Viral testing is negative, UA was not concerning for infection.  Pregnancy test was negative.  Of note, the patient's child's influenza B test was positive.  I discussed the results with the patient, I advised that did not think there was an emergent cause for her symptoms, however she has persistent symptoms or they worsen, she should return to the emergency department or follow up with her primary care physician to be evaluated.  I answered all questions, provided discharge paperwork and the patient was discharged in stable condition. [BP]      ED Course User Index  [BP] Byron Arellano MD                    Clinical Impression:   Final diagnoses:  [R11.0] Nausea (Primary)        ED Disposition Condition    Discharge Stable          ED Prescriptions    None       Follow-up Information       Follow up With Specialties Details Why Contact Info    Keshav Ni MD Internal Medicine  As needed, If symptoms worsen [address]  [place] [address]  [phone]      [facility] - Emergency Dept Emergency Medicine  As needed, If symptoms worsen [address] [facility] [id number] [phone]             Byron Arellano MD  Resident  11/09/23 1950

## 2023-11-18 ENCOUNTER — HOSPITAL ENCOUNTER (EMERGENCY)
Facility: OTHER | Age: 28
Discharge: HOME | End: 2023-11-19
Attending: INTERNAL MEDICINE
Payer: COMMERCIAL

## 2023-11-18 DIAGNOSIS — J11.1 INFLUENZA: Primary | ICD-10-CM

## 2023-11-18 LAB
CTP QC/QA: YES
CTP QC/QA: YES
HCV AB SERPL QL IA: NEGATIVE
HIV 1+2 AB+HIV1 P24 AG SERPL QL IA: NEGATIVE
POC MOLECULAR INFLUENZA A AGN: POSITIVE
POC MOLECULAR INFLUENZA B AGN: NEGATIVE
POCT GLUCOSE: 92 MG/DL (ref 70–110)
SARS-COV-2 RDRP RESP QL NAA+PROBE: NEGATIVE

## 2023-11-18 PROCEDURE — 87389 HIV-1 AG W/HIV-1&-2 AB AG IA: CPT | Performed by: INTERNAL MEDICINE

## 2023-11-18 PROCEDURE — 99284 EMERGENCY DEPT VISIT MOD MDM: CPT | Mod: 25

## 2023-11-18 PROCEDURE — 81025 URINE PREGNANCY TEST: CPT | Performed by: INTERNAL MEDICINE

## 2023-11-18 PROCEDURE — 86803 HEPATITIS C AB TEST: CPT | Performed by: INTERNAL MEDICINE

## 2023-11-18 PROCEDURE — 87635 SARS-COV-2 COVID-19 AMP PRB: CPT | Performed by: INTERNAL MEDICINE

## 2023-11-18 PROCEDURE — 96361 HYDRATE IV INFUSION ADD-ON: CPT

## 2023-11-18 PROCEDURE — 96374 THER/PROPH/DIAG INJ IV PUSH: CPT

## 2023-11-18 PROCEDURE — 63600175 PHARM REV CODE 636 W HCPCS: Performed by: INTERNAL MEDICINE

## 2023-11-18 PROCEDURE — 82962 GLUCOSE BLOOD TEST: CPT

## 2023-11-18 PROCEDURE — 25000003 PHARM REV CODE 250: Performed by: INTERNAL MEDICINE

## 2023-11-18 RX ORDER — ACETAMINOPHEN 500 MG
1000 TABLET ORAL
Status: COMPLETED | OUTPATIENT
Start: 2023-11-18 | End: 2023-11-18

## 2023-11-18 RX ORDER — ONDANSETRON 2 MG/ML
4 INJECTION INTRAMUSCULAR; INTRAVENOUS
Status: COMPLETED | OUTPATIENT
Start: 2023-11-18 | End: 2023-11-18

## 2023-11-18 RX ORDER — IBUPROFEN 600 MG/1
600 TABLET ORAL
Status: COMPLETED | OUTPATIENT
Start: 2023-11-18 | End: 2023-11-18

## 2023-11-18 RX ADMIN — SODIUM CHLORIDE 1000 ML: 9 INJECTION, SOLUTION INTRAVENOUS at 11:11

## 2023-11-18 RX ADMIN — IBUPROFEN 600 MG: 600 TABLET, FILM COATED ORAL at 11:11

## 2023-11-18 RX ADMIN — ACETAMINOPHEN 1000 MG: 500 TABLET ORAL at 11:11

## 2023-11-18 RX ADMIN — ONDANSETRON 4 MG: 2 INJECTION INTRAMUSCULAR; INTRAVENOUS at 10:11

## 2023-11-19 VITALS
BODY MASS INDEX: 23.39 KG/M2 | TEMPERATURE: 98 F | HEIGHT: 64 IN | RESPIRATION RATE: 18 BRPM | OXYGEN SATURATION: 100 % | DIASTOLIC BLOOD PRESSURE: 71 MMHG | WEIGHT: 137 LBS | HEART RATE: 72 BPM | SYSTOLIC BLOOD PRESSURE: 121 MMHG

## 2023-11-19 LAB
B-HCG UR QL: NEGATIVE
BILIRUB UR QL STRIP: NEGATIVE
CLARITY UR: CLEAR
COLOR UR: YELLOW
CTP QC/QA: YES
GLUCOSE UR QL STRIP: NEGATIVE
HGB UR QL STRIP: NEGATIVE
KETONES UR QL STRIP: ABNORMAL
LEUKOCYTE ESTERASE UR QL STRIP: NEGATIVE
NITRITE UR QL STRIP: NEGATIVE
PH UR STRIP: 7 [PH] (ref 5–8)
PROT UR QL STRIP: NEGATIVE
SP GR UR STRIP: 1 (ref 1–1.03)
URN SPEC COLLECT METH UR: ABNORMAL
UROBILINOGEN UR STRIP-ACNC: NEGATIVE EU/DL

## 2023-11-19 PROCEDURE — 81003 URINALYSIS AUTO W/O SCOPE: CPT | Performed by: INTERNAL MEDICINE

## 2023-11-19 PROCEDURE — 25000003 PHARM REV CODE 250: Performed by: INTERNAL MEDICINE

## 2023-11-19 PROCEDURE — 96361 HYDRATE IV INFUSION ADD-ON: CPT

## 2023-11-19 RX ORDER — ONDANSETRON 4 MG/1
4 TABLET, ORALLY DISINTEGRATING ORAL EVERY 6 HOURS PRN
Qty: 12 TABLET | Refills: 0 | Status: SHIPPED | OUTPATIENT
Start: 2023-11-19 | End: 2024-02-29

## 2023-11-19 RX ADMIN — SODIUM CHLORIDE 1000 ML: 9 INJECTION, SOLUTION INTRAVENOUS at 12:11

## 2023-11-19 NOTE — DISCHARGE INSTRUCTIONS
Diagnosis:   1. Influenza        Tests you had showed:   Labs Reviewed   URINALYSIS, REFLEX TO URINE CULTURE - Abnormal; Notable for the following components:       Result Value    Ketones, UA Trace (*)     All other components within normal limits    Narrative:     Specimen Source->Urine   POCT INFLUENZA A/B MOLECULAR - Abnormal; Notable for the following components:    POC Molecular Influenza A Ag Positive (*)     All other components within normal limits   HIV 1 / 2 ANTIBODY    Narrative:     Release to patient->Immediate   HEPATITIS C ANTIBODY    Narrative:     Release to patient->Immediate   SARS-COV-2 RDRP GENE   POCT URINE PREGNANCY   POCT GLUCOSE      No orders to display       Treatments you received were:   Medications   ondansetron injection 4 mg (4 mg Intravenous Given 11/18/23 2256)   sodium chloride 0.9% bolus 1,000 mL 1,000 mL (0 mLs Intravenous Stopped 11/19/23 0118)   ibuprofen tablet 600 mg (600 mg Oral Given 11/18/23 2354)   acetaminophen tablet 1,000 mg (1,000 mg Oral Given 11/18/23 2354)   sodium chloride 0.9% bolus 1,000 mL 1,000 mL (1,000 mLs Intravenous New Bag 11/19/23 0051)       Home Care Instructions:  - Medications: Continue taking your home medications as prescribed    Follow-Up Plan:  - Follow-up with: Primary care doctor within 1-2  days  - Additional testing and/or evaluation will be directed by your primary doctor    Return to the Emergency Department for symptoms including but not limited to: worsening symptoms, severe back pain, shortness of breath or chest pain, vomiting with inability to hold down fluids, blood in vomit or poop, fevers greater than 100.4°F, passing out/fainting/unconsciousness, or other concerning symptoms.     Future Appointments   Date Time Provider Department Center   1/26/2024  1:00 PM Keshav Ni MD [facility]     You can use Motrin, Tylenol, Flonase and Afrin for your symptoms.

## 2023-11-19 NOTE — ED NOTES
"Asked patient if she was able to provide urine sample at this time, states " when she has the urge she will go and let me know" Presents with two ice cups of water at bedside.   "

## 2023-11-19 NOTE — ED PROVIDER NOTES
Encounter date: 10:29 PM 11/19/2023    Source of History   Patient    Chief Complaint   Pt presents with:   Emesis (Vomiting x 1 hour. Exposure to influenza by daughter)      History Of Present Illness   Rhea Gilman is a 28 y.o. female with Afib (not anticoagulated) who presents to the ED for evaluation of sudden onset of nausea and vomiting earlier this evening. Reports approximately 4 episodes of nonbloody vomiting today although she has been able to intermittently tolerate fluids. Her nausea and vomiting has improved with Zofran here. Associated symptoms include cough, myalgias , generalized weakness, headache, and sinus congestion. She does report recent sick contacts, stating her daughter had the flu approximately 1 week ago. She denies chest pain, shortness of breath, abdominal pain, trauma, pain elsewhere, other somatic complaints.     This is the extent to the patients complaints today here in the emergency department.  Past History     Review of patient's allergies indicates:   Allergen Reactions    Amoxicillin Hives       No current facility-administered medications on file prior to encounter.     Current Outpatient Medications on File Prior to Encounter   Medication Sig Dispense Refill    busPIRone (BUSPAR) 5 MG Tab Take 1 tablet (5 mg total) by mouth 2 (two) times daily. 180 tablet 0    dextroamphetamine-amphetamine (ADDERALL XR) 30 MG 24 hr capsule Take 1 capsule (30 mg total) by mouth every morning. 30 capsule 0    metFORMIN (GLUCOPHAGE) 500 MG tablet Take 1 tablet (500 mg total) by mouth 2 (two) times daily with meals. 180 tablet 3    omeprazole (PRILOSEC) 40 MG capsule Take 1 capsule (40 mg total) by mouth once daily. 90 capsule 3    ondansetron (ZOFRAN-ODT) 8 MG TbDL Take 1 tablet (8 mg total) by mouth every 6 (six) hours as needed. 30 tablet 1    propranoloL (INDERAL) 10 MG tablet Take 2 tablets (20 mg total) by mouth 3 (three) times daily. 180 tablet 3    semaglutide (OZEMPIC) 1 mg/dose (4  mg/3 mL) Inject 1 mg into the skin every 7 days. for 12 doses 3 mL 2       As per HPI and below:  Past Medical History:   Diagnosis Date    Anemia     Anxiety     Atrial fibrillation 2017    converted with calcium channel blockers at [facility]    Migraine headache      Past Surgical History:   Procedure Laterality Date    AUGMENTATION OF BREAST Bilateral 2022    Procedure: MAMMOPLASTY, AUGMENTATION;  Surgeon: Peterson Schumacher MD;  Location: [facility];  Service: Plastics;  Laterality: Bilateral;    HYSTEROSCOPY WITH DILATION AND CURETTAGE OF UTERUS N/A 2023    Procedure: HYSTEROSCOPY, WITH DILATION AND CURETTAGE OF UTERUS;  Surgeon: Juan C Alvarenga MD;  Location: [facility];  Service: OB/GYN;  Laterality: N/A;    VAGINAL DELIVERY         Social History     Socioeconomic History    Marital status: Single   Tobacco Use    Smoking status: Former     Current packs/day: 0.00     Types: Cigarettes     Quit date: 3/18/2021     Years since quittin.6    Smokeless tobacco: Never    Tobacco comments:     1 pack 3 days   Substance and Sexual Activity    Alcohol use: No    Drug use: Not Currently    Sexual activity: Yes     Partners: Male     Birth control/protection: None       Family History   Problem Relation Age of Onset    Breast cancer Maternal Grandmother     Diabetes Mother     Kidney disease Mother     Vision loss Mother     Colon cancer Neg Hx     Ovarian cancer Neg Hx        Physical Exam     Vitals:    23 2354 23 0002 23 0219 23 0238   BP:  116/74 121/71 121/71   BP Location:   Right arm Right arm   Patient Position:   Sitting Lying   Pulse:  91 72 72   Resp:  20 18 18   Temp: 98.4 °F (36.9 °C)  98.2 °F (36.8 °C) 98.2 °F (36.8 °C)   TempSrc:   Oral Oral   SpO2:  100% 100% 100%   Weight:       Height:         Physical Exam:   Nursing note and vitals reviewed.  Appearance: Well appearing, nontoxic female in no acute respiratory distress.  Making purposeful movements.  Speaking in  full sentences.  Skin: No obvious rashes seen.  Good turgor.  No abrasions.  No ecchymoses.  Eyes: No conjunctival injection. EOMI, PERRL.  Head: NC/AT  Chest: CTAB. No increased work of breathing, bilateral chest rise.  Cardiovascular: Regular rate and rhythm.  Normal equal bilateral radial pulses.  Abdomen: Soft.  Not distended.  Nontender.  No guarding.  No rebound. No Masses  Musculoskeletal: No obvious deformities.   Neck supple, full range of motion, no obvious deformity.   No tenderness to palpation of cervical through lumbar spine.  No step-offs or deformities. Good range of motion all joints.  Neurologic: Moves all extremities.  Normal sensation.  No facial droop.  Normal speech.    Mental Status:  Alert and oriented x 3.  Appropriate, conversant.      Results and Medications    Procedures    Labs Reviewed   URINALYSIS, REFLEX TO URINE CULTURE - Abnormal; Notable for the following components:       Result Value    Ketones, UA Trace (*)     All other components within normal limits    Narrative:     Specimen Source->Urine   POCT INFLUENZA A/B MOLECULAR - Abnormal; Notable for the following components:    POC Molecular Influenza A Ag Positive (*)     All other components within normal limits   HIV 1 / 2 ANTIBODY    Narrative:     Release to patient->Immediate   HEPATITIS C ANTIBODY    Narrative:     Release to patient->Immediate   SARS-COV-2 RDRP GENE   POCT URINE PREGNANCY   POCT GLUCOSE       Imaging Results    None             Medications   ondansetron injection 4 mg (4 mg Intravenous Given 11/18/23 2256)   sodium chloride 0.9% bolus 1,000 mL 1,000 mL (0 mLs Intravenous Stopped 11/19/23 0118)   ibuprofen tablet 600 mg (600 mg Oral Given 11/18/23 2354)   acetaminophen tablet 1,000 mg (1,000 mg Oral Given 11/18/23 2354)   sodium chloride 0.9% bolus 1,000 mL 1,000 mL (0 mLs Intravenous Stopped 11/19/23 0219)       MDM, Impression and Plan   Clinical Tests:   Lab Tests: Ordered and Reviewed  Radiological  Study: Ordered and Reviewed  Medical Tests: Ordered and Reviewed    Differential diagnosis:  -Flu  -COVID-19  -Viral syndrome  -Dehydration  -Pregnancy  -UTI    Initial Assessment & ED Management:    Urgent evaluation of 28 y.o. female with History as above who presents the ED with chief complaint of emesis was associated viral-like symptoms.  Afebrile tachycardic yet hemodynamically stable in no acute respiratory distress on arrival.  Treated with 2 L of IV fluids, Motrin Tylenol and Zofran with reduction in symptoms, nausea and tachycardia.  Urinalysis not consistent with UTI.  COVID negative.  Positive for flu.  She p.o. challenged successfully.  Vitals stable.  Discussed oseltamivir, patient declined.  Supportive care measures addressed.  Care plan addressed with patient and all those present. All questions answered.  Strict return precautions discussed.  Patient was instructed on the correct follow-up time and route.  They voiced verbal understanding and agreement  with the plan and were deemed stable for discharge.     Medical Decision Making  Amount and/or Complexity of Data Reviewed  Labs: ordered. Decision-making details documented in ED Course.    Risk  OTC drugs.  Prescription drug management.             Please see ED course for discussion of workup and dispo if not listed above.          Final diagnoses:  [J11.1] Influenza (Primary)        ED Disposition Condition    Discharge Stable          ED Prescriptions       Medication Sig Dispense Start Date End Date Auth. Provider    ondansetron (ZOFRAN-ODT) 4 MG TbDL Take 1 tablet (4 mg total) by mouth every 6 (six) hours as needed. 12 tablet 11/19/2023 12/1/2023 Diane Almonte MD          Follow-up Information       Follow up With Specialties Details Why Contact Info    Keshav Ni MD Internal Medicine In 2 days  [address]  [place] [id number] [phone]      [facility] Emergency Dept Emergency Medicine  If symptoms worsen [address]  [facility] [address]  [phone]            ED Course as of 11/19/23 2132   Sat Nov 18, 2023   2250 POC Molecular Influenza A Ag(!): Positive [HM]      ED Course User Index  [HM] Diane Almonte MD            I, Corie Malcolm, scribed for, and in the presence of, Diane Almonte MD. I performed the scribed service and the documentation accurately describes the services I performed. I attest to the accuracy of the note.    I, Diane Almonte, personally performed the services described in this documentation. All medical record entries made by the scribe were at my direction and in my presence. I have reviewed the chart and agree that the record reflects my personal performance and is accurate and complete.    MD Gage Ruffin Heyward B, MD  11/19/23 2132

## 2023-12-04 DIAGNOSIS — E88.819 INSULIN RESISTANCE: ICD-10-CM

## 2023-12-04 DIAGNOSIS — E66.3 OVERWEIGHT (BMI 25.0-29.9): ICD-10-CM

## 2023-12-04 RX ORDER — SEMAGLUTIDE 1.34 MG/ML
1 INJECTION, SOLUTION SUBCUTANEOUS
Qty: 3 ML | Refills: 2 | Status: SHIPPED | OUTPATIENT
Start: 2023-12-04 | End: 2024-02-02 | Stop reason: SDUPTHER

## 2023-12-15 RX ORDER — DEXTROAMPHETAMINE SACCHARATE, AMPHETAMINE ASPARTATE MONOHYDRATE, DEXTROAMPHETAMINE SULFATE AND AMPHETAMINE SULFATE 7.5; 7.5; 7.5; 7.5 MG/1; MG/1; MG/1; MG/1
30 CAPSULE, EXTENDED RELEASE ORAL EVERY MORNING
Qty: 30 CAPSULE | Refills: 0 | Status: SHIPPED | OUTPATIENT
Start: 2023-12-15 | End: 2024-01-26 | Stop reason: SDUPTHER

## 2023-12-27 ENCOUNTER — OFFICE VISIT (OUTPATIENT)
Dept: OPTOMETRY | Facility: CLINIC | Age: 28
End: 2023-12-27
Payer: COMMERCIAL

## 2023-12-27 DIAGNOSIS — H02.729 MADAROSIS OF EYELID, UNSPECIFIED LATERALITY: Primary | ICD-10-CM

## 2023-12-27 DIAGNOSIS — H52.221 REGULAR ASTIGMATISM OF RIGHT EYE: ICD-10-CM

## 2023-12-27 DIAGNOSIS — H52.12 MYOPIA OF LEFT EYE WITH ASTIGMATISM: ICD-10-CM

## 2023-12-27 DIAGNOSIS — H52.202 MYOPIA OF LEFT EYE WITH ASTIGMATISM: ICD-10-CM

## 2023-12-27 PROCEDURE — 92015 PR REFRACTION: ICD-10-PCS | Mod: S$GLB,,, | Performed by: OPTOMETRIST

## 2023-12-27 PROCEDURE — 92015 DETERMINE REFRACTIVE STATE: CPT | Mod: S$GLB,,, | Performed by: OPTOMETRIST

## 2023-12-27 PROCEDURE — 1160F RVW MEDS BY RX/DR IN RCRD: CPT | Mod: CPTII,S$GLB,, | Performed by: OPTOMETRIST

## 2023-12-27 PROCEDURE — 1159F PR MEDICATION LIST DOCUMENTED IN MEDICAL RECORD: ICD-10-PCS | Mod: CPTII,S$GLB,, | Performed by: OPTOMETRIST

## 2023-12-27 PROCEDURE — 1160F PR REVIEW ALL MEDS BY PRESCRIBER/CLIN PHARMACIST DOCUMENTED: ICD-10-PCS | Mod: CPTII,S$GLB,, | Performed by: OPTOMETRIST

## 2023-12-27 PROCEDURE — 92014 COMPRE OPH EXAM EST PT 1/>: CPT | Mod: S$GLB,,, | Performed by: OPTOMETRIST

## 2023-12-27 PROCEDURE — 99999 PR PBB SHADOW E&M-EST. PATIENT-LVL II: ICD-10-PCS | Mod: PBBFAC,,, | Performed by: OPTOMETRIST

## 2023-12-27 PROCEDURE — 92014 PR EYE EXAM, EST PATIENT,COMPREHESV: ICD-10-PCS | Mod: S$GLB,,, | Performed by: OPTOMETRIST

## 2023-12-27 PROCEDURE — 1159F MED LIST DOCD IN RCRD: CPT | Mod: CPTII,S$GLB,, | Performed by: OPTOMETRIST

## 2023-12-27 PROCEDURE — 3044F PR MOST RECENT HEMOGLOBIN A1C LEVEL <7.0%: ICD-10-PCS | Mod: CPTII,S$GLB,, | Performed by: OPTOMETRIST

## 2023-12-27 PROCEDURE — 3044F HG A1C LEVEL LT 7.0%: CPT | Mod: CPTII,S$GLB,, | Performed by: OPTOMETRIST

## 2023-12-27 PROCEDURE — 99999 PR PBB SHADOW E&M-EST. PATIENT-LVL II: CPT | Mod: PBBFAC,,, | Performed by: OPTOMETRIST

## 2023-12-27 NOTE — PROGRESS NOTES
"HPI    DSL- 9/7/2022 Dr. Gerardo    27 y/o female present to clinic for concern of droopy LENIN since childhood.   No changes/stable. She states vision is blurry, last MRX is not "strong   enough". She has eye allergies. She states eyelashes has been falling off-   she does wear cosmetic eyelashes often. She has floaters. Denies flashes   of lights. She uses ofloxacin for ocular pain of LT eye. Mother is blind   from Glaucoma.     Eyemeds  Ofloxacin TID OS  Last edited by Poonam Graves, OD on 12/27/2023  4:30 PM.          Assessment /Plan     For exam results, see Encounter Report.    Madarosis of eyelid, unspecified laterality   - Patient educated on condition and advised to discontinue use of cosmetic eyelashes to give lashes a chance to grow back. Lash loss likely caused by cosmetic eyelash over wear and trauma when removing glue.    - RTC should condition worsen or fail to improve    Myopia of left eye with astigmatism  Regular astigmatism of right eye   - New Spectacle Rx given. RTC 1 year for routine eye exam.                   "

## 2024-01-26 ENCOUNTER — OFFICE VISIT (OUTPATIENT)
Dept: PRIMARY CARE CLINIC | Facility: CLINIC | Age: 29
End: 2024-01-26
Payer: COMMERCIAL

## 2024-01-26 ENCOUNTER — CLINICAL SUPPORT (OUTPATIENT)
Dept: PRIMARY CARE CLINIC | Facility: CLINIC | Age: 29
End: 2024-01-26
Payer: COMMERCIAL

## 2024-01-26 VITALS
OXYGEN SATURATION: 98 % | TEMPERATURE: 99 F | BODY MASS INDEX: 23.34 KG/M2 | HEART RATE: 95 BPM | DIASTOLIC BLOOD PRESSURE: 68 MMHG | HEIGHT: 64 IN | SYSTOLIC BLOOD PRESSURE: 110 MMHG | WEIGHT: 136.69 LBS

## 2024-01-26 DIAGNOSIS — F90.1 ADHD, HYPERACTIVE-IMPULSIVE TYPE: Primary | ICD-10-CM

## 2024-01-26 DIAGNOSIS — F41.9 ANXIETY AND DEPRESSION: ICD-10-CM

## 2024-01-26 DIAGNOSIS — K21.9 GASTROESOPHAGEAL REFLUX DISEASE, UNSPECIFIED WHETHER ESOPHAGITIS PRESENT: ICD-10-CM

## 2024-01-26 DIAGNOSIS — G43.009 MIGRAINE WITHOUT AURA AND WITHOUT STATUS MIGRAINOSUS, NOT INTRACTABLE: ICD-10-CM

## 2024-01-26 DIAGNOSIS — F32.A ANXIETY AND DEPRESSION: ICD-10-CM

## 2024-01-26 PROCEDURE — 99499 UNLISTED E&M SERVICE: CPT | Mod: S$GLB,,, | Performed by: INTERNAL MEDICINE

## 2024-01-26 PROCEDURE — 90832 PSYTX W PT 30 MINUTES: CPT | Mod: S$GLB,,, | Performed by: SOCIAL WORKER

## 2024-01-26 RX ORDER — DEXTROAMPHETAMINE SACCHARATE, AMPHETAMINE ASPARTATE MONOHYDRATE, DEXTROAMPHETAMINE SULFATE AND AMPHETAMINE SULFATE 7.5; 7.5; 7.5; 7.5 MG/1; MG/1; MG/1; MG/1
30 CAPSULE, EXTENDED RELEASE ORAL EVERY MORNING
Qty: 30 CAPSULE | Refills: 0 | Status: SHIPPED | OUTPATIENT
Start: 2024-01-26 | End: 2024-01-31

## 2024-01-26 RX ORDER — BUSPIRONE HYDROCHLORIDE 10 MG/1
10 TABLET ORAL 2 TIMES DAILY
Qty: 180 TABLET | Refills: 0 | Status: SHIPPED | OUTPATIENT
Start: 2024-01-26 | End: 2024-05-01

## 2024-01-26 NOTE — PROGRESS NOTES
Individual Psychotherapy (LCSW/PhD)  Rhea Hui Gilman,  1/26/2024    Site:  [facility]         Therapeutic Intervention: Met with patient for individual psychotherapy.    Chief complaint/reason for encounter: addictive disorder and attention deficit     Interval history and content of current session: Pt and LCSW discussed inattentiveness and role of medication in managing ADHD. Pt reports better functioning and focus I.e.e self-care and motivation. Pt states they feel like medication has been pivotal on their needs.     Pt and LCSW discussed role of medication vs. Talk therapy. Pt and LCSW discussed some distraction with conversations. Pt does not report any serious issues and states they lack of focus is not causing issues with work or home. LCSW and pt discussed conversational skills and using listen repeat to help pt.  and also writing things down.  Pt states they have also noticed reduced anxiety.     LCSW encouraged pt to research and practice ADHD social skills.     Treatment plan:  Target symptoms: distractability  Why chosen therapy is appropriate versus another modality: relevant to diagnosis, evidence based practice  Outcome monitoring methods: self-report, checklist/rating scale  Therapeutic intervention type: supportive psychotherapy    Risk parameters:  Patient reports no suicidal ideation  Patient reports no homicidal ideation  Patient reports no self-injurious behavior  Patient reports no violent behavior    Verbal deficits: None    Patient's response to intervention:  The patient's response to intervention is accepting.    Progress toward goals and other mental status changes:  The patient's progress toward goals is good.    Diagnosis:   No diagnosis found.    Plan: Pt plans to continue individual psychotherapy    Return to clinic: as needed    Length of Service (minutes): 30

## 2024-01-26 NOTE — PROGRESS NOTES
28-year-old woman with a history of dysthymia, migraines, ADHD, and history of elevated BMI now normal (23.46 kilograms/meter squared, 67 lb weight loss overall), here for follow-up.    Pertinent review of systems:  ADHD:  The patient notes she is doing better but still has a she is focusing at work especially with her employer and also texting on her cell phone.  She does note a big change in she has been able to improve her routine.  She notes no palpitations or chest pain.  She has no unexplained diaphoresis.  Her anxiety has also decreased and the patient has stopped her propranolol.    Anxiety:  The patient is working with therapist Amena for ADHD but also for issues of anxiety.  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  She has increased her buspirone to 10 mg twice daily and she now feels the anxiety component is optimized.  The patient is not depressed.  She is exercising 3 days a week and also she is improved her diet.      Elevated BMI:  The patient's BMI has now normalized.  She continues on a combination of metformin and semaglutide which is controlling her weight appetite.  She has lost a total of 67 lb.  In addition as noted above she has begun a regular exercise program and diet.  The patient has no issues or common side effects of either metformin or semaglutide.  Acid reflux since weight loss has improved in his only using her PPI as needed    Problem list, medication list, and allergies have been reviewed.  Patient developed hives and a amoxicillin     Review of systems:  Other than wearing glasses the patient is feeling well and doing well and has no other complaints.      Physical Exam  Vitals and nursing note reviewed.   Constitutional:       General: She is not in acute distress.     Appearance: She is normal weight. She is not ill-appearing, toxic-appearing or diaphoretic.   HENT:      Head: Atraumatic.      Right Ear: Tympanic membrane, ear canal and external ear normal.       Left Ear: Tympanic membrane, ear canal and external ear normal.      Nose: Nose normal. No congestion or rhinorrhea.      Mouth/Throat:      Mouth: Mucous membranes are moist.      Pharynx: Oropharynx is clear. No oropharyngeal exudate or posterior oropharyngeal erythema.   Eyes:      General: No scleral icterus.     Conjunctiva/sclera: Conjunctivae normal.      Pupils: Pupils are equal, round, and reactive to light.   Neck:      Vascular: No carotid bruit.      Comments: Thyroid without enlargement, nodule, or bruit.  Cardiovascular:      Rate and Rhythm: Normal rate and regular rhythm.      Pulses: Normal pulses.      Heart sounds: Normal heart sounds. No murmur heard.     No gallop.   Pulmonary:      Effort: Pulmonary effort is normal. No respiratory distress.      Breath sounds: Normal breath sounds. No wheezing, rhonchi or rales.   Abdominal:      General: Bowel sounds are normal. There is no distension.      Palpations: Abdomen is soft.      Tenderness: There is no abdominal tenderness.      Comments: No hepatosplenomegaly   Musculoskeletal:         General: No swelling or tenderness. Normal range of motion.      Cervical back: Neck supple. No rigidity or tenderness.      Right lower leg: No edema.      Left lower leg: No edema.   Lymphadenopathy:      Cervical: No cervical adenopathy.   Skin:     Coloration: Skin is not jaundiced.      Findings: No rash.   Neurological:      General: No focal deficit present.      Mental Status: She is alert and oriented to person, place, and time.   Psychiatric:         Mood and Affect: Mood normal.         Behavior: Behavior normal.     Assessment/plan:  ADHD:  The patient is ADHD is improved but it was not at the level that she would hoped it would be.  She is wondering whether to increase her medication or not.  She has an appointment with a psychologist today who is working with her on methods which are nonpharmacological to improve her condition.    Plan reviewed  above with patient   Will discuss with her therapist whether or not to increase ADHD medicine at this time.      Anxiety/depression:  The patient has no symptoms of either anxiety or depression at this time.  She notes that the buspirone has fully controlled her symptoms and she no longer takes propranolol.  She has no longer experiencing palpitations.  The patient is very positive in his developed a routine regarding diet, exercise, and her approach to life.  She did increase her buspirone to 10 mg with a better effect.    Plan:  Reviewed above with patient   Will continue the buspirone 10 mg     Elevated BMI:  Her BMI is now normalized but she continues on Ozempic and metformin.  The bariatric service has reduced her Ozempic to 1 mg.  Her other symptoms related to her elevated BMI are resolving and she is now using her acid reflux medication on occasion when she knows that she may be eating something which tends to exacerbate this.  Plan:  Reviewed above with patient   The patient will have laboratory at next visit.    Acid reflux:  The patient uses medication typically no more than once a week and in anticipation of foods that may or once have exacerbated her symptoms.  Plan:  Continue current management

## 2024-01-31 RX ORDER — DEXTROAMPHETAMINE SACCHARATE, AMPHETAMINE ASPARTATE MONOHYDRATE, DEXTROAMPHETAMINE SULFATE AND AMPHETAMINE SULFATE 7.5; 7.5; 7.5; 7.5 MG/1; MG/1; MG/1; MG/1
30 CAPSULE, EXTENDED RELEASE ORAL EVERY MORNING
Qty: 30 CAPSULE | Refills: 0 | Status: SHIPPED | OUTPATIENT
Start: 2024-01-31 | End: 2024-03-06 | Stop reason: SDUPTHER

## 2024-02-02 DIAGNOSIS — E66.3 OVERWEIGHT (BMI 25.0-29.9): ICD-10-CM

## 2024-02-02 DIAGNOSIS — E88.819 INSULIN RESISTANCE: ICD-10-CM

## 2024-02-02 RX ORDER — SEMAGLUTIDE 1.34 MG/ML
1 INJECTION, SOLUTION SUBCUTANEOUS
Qty: 3 ML | Refills: 2 | Status: SHIPPED | OUTPATIENT
Start: 2024-02-02 | End: 2024-05-24 | Stop reason: SDUPTHER

## 2024-02-23 ENCOUNTER — PATIENT MESSAGE (OUTPATIENT)
Dept: OPTOMETRY | Facility: CLINIC | Age: 29
End: 2024-02-23
Payer: COMMERCIAL

## 2024-03-01 ENCOUNTER — PATIENT MESSAGE (OUTPATIENT)
Dept: PRIMARY CARE CLINIC | Facility: CLINIC | Age: 29
End: 2024-03-01
Payer: COMMERCIAL

## 2024-03-02 DIAGNOSIS — G43.009 MIGRAINE WITHOUT AURA AND WITHOUT STATUS MIGRAINOSUS, NOT INTRACTABLE: Primary | ICD-10-CM

## 2024-03-03 NOTE — PROGRESS NOTES
Patient states in her message that topiramate did not work for her however in her October visit she would stopped it because her headaches were only mild and she did not wish to take it.  Prior to that she stated that the migraines were controlled by topiramate.  The patient has switched to ibuprofen 800 mg and Tylenol has a single blows to control her headaches.  On her last visit in January this year she did not even discuss migraines because they were not a problem.  I therefore I am surprised by her new crest but on her her wishes referring her Neurology.

## 2024-03-06 RX ORDER — DEXTROAMPHETAMINE SACCHARATE, AMPHETAMINE ASPARTATE MONOHYDRATE, DEXTROAMPHETAMINE SULFATE AND AMPHETAMINE SULFATE 7.5; 7.5; 7.5; 7.5 MG/1; MG/1; MG/1; MG/1
30 CAPSULE, EXTENDED RELEASE ORAL EVERY MORNING
Qty: 30 CAPSULE | Refills: 0 | Status: SHIPPED | OUTPATIENT
Start: 2024-03-06 | End: 2024-04-02 | Stop reason: SDUPTHER

## 2024-03-06 RX ORDER — DEXTROAMPHETAMINE SACCHARATE, AMPHETAMINE ASPARTATE MONOHYDRATE, DEXTROAMPHETAMINE SULFATE AND AMPHETAMINE SULFATE 7.5; 7.5; 7.5; 7.5 MG/1; MG/1; MG/1; MG/1
30 CAPSULE, EXTENDED RELEASE ORAL EVERY MORNING
Qty: 30 CAPSULE | Refills: 0 | OUTPATIENT
Start: 2024-03-06 | End: 2024-04-05

## 2024-03-07 ENCOUNTER — PATIENT MESSAGE (OUTPATIENT)
Dept: PRIMARY CARE CLINIC | Facility: CLINIC | Age: 29
End: 2024-03-07
Payer: COMMERCIAL

## 2024-03-20 ENCOUNTER — OFFICE VISIT (OUTPATIENT)
Dept: OPTOMETRY | Facility: CLINIC | Age: 29
End: 2024-03-20
Payer: COMMERCIAL

## 2024-03-20 DIAGNOSIS — Z46.0 FITTING AND ADJUSTMENT OF SPECTACLES AND CONTACT LENSES: ICD-10-CM

## 2024-03-20 DIAGNOSIS — H52.202 MYOPIA OF LEFT EYE WITH ASTIGMATISM: Primary | ICD-10-CM

## 2024-03-20 DIAGNOSIS — H52.221 REGULAR ASTIGMATISM OF RIGHT EYE: ICD-10-CM

## 2024-03-20 DIAGNOSIS — H52.12 MYOPIA OF LEFT EYE WITH ASTIGMATISM: Primary | ICD-10-CM

## 2024-03-20 PROCEDURE — 92310 CONTACT LENS FITTING OU: CPT | Mod: CSM,S$GLB,, | Performed by: OPTOMETRIST

## 2024-03-20 PROCEDURE — 99999 PR PBB SHADOW E&M-EST. PATIENT-LVL II: CPT | Mod: PBBFAC,,, | Performed by: OPTOMETRIST

## 2024-03-20 NOTE — PROGRESS NOTES
HPI    Pt here for CL fit   Last edited by Miladis Chávez on 3/20/2024  1:26 PM.            Assessment /Plan     For exam results, see Encounter Report.        Myopia of left eye with astigmatism  Regular astigmatism of right eye   - New CL Rx given, discussed different options for CLs.    - Trial lens ordered, pt will  when they are in    - Pt advised to hold off on ordering CL's until trials come in.         Addendum 6/6/24: Pt requests original trial lenses be finalized as new Rx. New CL Rx finalized and released per pt request.

## 2024-03-27 ENCOUNTER — PATIENT MESSAGE (OUTPATIENT)
Dept: OPTOMETRY | Facility: CLINIC | Age: 29
End: 2024-03-27
Payer: COMMERCIAL

## 2024-04-02 RX ORDER — DEXTROAMPHETAMINE SACCHARATE, AMPHETAMINE ASPARTATE MONOHYDRATE, DEXTROAMPHETAMINE SULFATE AND AMPHETAMINE SULFATE 7.5; 7.5; 7.5; 7.5 MG/1; MG/1; MG/1; MG/1
30 CAPSULE, EXTENDED RELEASE ORAL EVERY MORNING
Qty: 30 CAPSULE | Refills: 0 | Status: SHIPPED | OUTPATIENT
Start: 2024-04-02 | End: 2024-04-24 | Stop reason: SDUPTHER

## 2024-04-09 ENCOUNTER — PATIENT MESSAGE (OUTPATIENT)
Dept: OPTOMETRY | Facility: CLINIC | Age: 29
End: 2024-04-09
Payer: COMMERCIAL

## 2024-04-12 ENCOUNTER — PATIENT MESSAGE (OUTPATIENT)
Dept: BARIATRICS | Facility: CLINIC | Age: 29
End: 2024-04-12
Payer: COMMERCIAL

## 2024-04-12 DIAGNOSIS — R11.0 NAUSEA: ICD-10-CM

## 2024-04-12 RX ORDER — ONDANSETRON 8 MG/1
8 TABLET, ORALLY DISINTEGRATING ORAL EVERY 6 HOURS PRN
Qty: 30 TABLET | Refills: 1 | Status: SHIPPED | OUTPATIENT
Start: 2024-04-12

## 2024-04-24 ENCOUNTER — OFFICE VISIT (OUTPATIENT)
Dept: PRIMARY CARE CLINIC | Facility: CLINIC | Age: 29
End: 2024-04-24
Payer: COMMERCIAL

## 2024-04-24 VITALS
HEART RATE: 109 BPM | RESPIRATION RATE: 18 BRPM | DIASTOLIC BLOOD PRESSURE: 76 MMHG | WEIGHT: 134.56 LBS | SYSTOLIC BLOOD PRESSURE: 112 MMHG | HEIGHT: 64 IN | BODY MASS INDEX: 22.97 KG/M2 | OXYGEN SATURATION: 99 % | TEMPERATURE: 98 F

## 2024-04-24 DIAGNOSIS — G43.009 MIGRAINE WITHOUT AURA AND WITHOUT STATUS MIGRAINOSUS, NOT INTRACTABLE: Primary | ICD-10-CM

## 2024-04-24 DIAGNOSIS — K21.9 GASTROESOPHAGEAL REFLUX DISEASE, UNSPECIFIED WHETHER ESOPHAGITIS PRESENT: ICD-10-CM

## 2024-04-24 DIAGNOSIS — F90.1 ADHD, HYPERACTIVE-IMPULSIVE TYPE: ICD-10-CM

## 2024-04-24 PROCEDURE — 99499 UNLISTED E&M SERVICE: CPT | Mod: S$GLB,,, | Performed by: INTERNAL MEDICINE

## 2024-04-24 RX ORDER — DEXTROAMPHETAMINE SACCHARATE, AMPHETAMINE ASPARTATE MONOHYDRATE, DEXTROAMPHETAMINE SULFATE AND AMPHETAMINE SULFATE 7.5; 7.5; 7.5; 7.5 MG/1; MG/1; MG/1; MG/1
30 CAPSULE, EXTENDED RELEASE ORAL EVERY MORNING
Qty: 30 CAPSULE | Refills: 0 | Status: SHIPPED | OUTPATIENT
Start: 2024-04-24 | End: 2024-04-24

## 2024-04-24 RX ORDER — DEXTROAMPHETAMINE SACCHARATE, AMPHETAMINE ASPARTATE MONOHYDRATE, DEXTROAMPHETAMINE SULFATE AND AMPHETAMINE SULFATE 7.5; 7.5; 7.5; 7.5 MG/1; MG/1; MG/1; MG/1
30 CAPSULE, EXTENDED RELEASE ORAL EVERY MORNING
Qty: 30 CAPSULE | Refills: 0 | Status: SHIPPED | OUTPATIENT
Start: 2024-04-24 | End: 2024-06-10 | Stop reason: SDUPTHER

## 2024-04-24 RX ORDER — OMEPRAZOLE 20 MG/1
20 CAPSULE, DELAYED RELEASE ORAL DAILY
Qty: 90 CAPSULE | Refills: 3 | Status: SHIPPED | OUTPATIENT
Start: 2024-04-24 | End: 2025-04-24

## 2024-04-24 NOTE — PROGRESS NOTES
29-year-old woman with a history of dysthymia, migraine, history of elevated BMI which is now normalized (loss of a proximally 70 lb on Ozempic which is being tapered had followed by bariatric), here for follow-up.      Pertinent review of systems:  ADHD:  The patient is currently on extended release Adderall in combination with BuSpar twice daily.  This combination for whatever reason has diminished her migraines.  The patient is using this combination daily 7 days a week and generally feels well.  The patient is not experiencing any tremor, palpitations, or unexplained chest pain.  There is no changes in exercise tolerance, symptoms of TIA, syncope, or lightheadedness.  The patient is moods are stable.    Anxiety:  The patient states that the therapist told her she did not need to follow-up any further.  She is currently on BuSpar twice daily which controls her symptoms.  Her daughter is doing much better which is also decreased her anxiety.  She also notes between the combination of ADHD in the BuSpar anxiety also has improved.    Elevated BMI:  The patient's weight is now optimized with a BMI of 23.1 kilograms/meter squared.  She is now on a tapering dose of Ozempic of 0.75 mg weekly.  She has not been tolerating metformin and she would like a similar drug to help decrease her risk of diabetes in the future.  She states she gets GI effects from the med woman.  I did review her note about what caused her last episode and she did eat foods no new trigger that event such as sausage.  The patient is exercising 3 times a week for 30 minutes.  She also has improved diet as 1 would expect.  She has not had any abdominal pain or constipation from the semaglutide.  She is being followed by bariatric     Acid reflux:  The patient is acid reflux is improved would like to taper her omeprazole 40 mg to 20 mg.  She has no unexplained cough, change in her voice, a sensation of food sticking in her mid chest, or problems  swallowing.  She has no acid her mouth.  She has not waking up choking.    Migraine:  The patient has migraines also improved relates this to the combination Adderall and BuSpar.  She did not tolerate the topiramate.  She notes that when she gets a headache Tylenol and Zofran typically resolves the issue    Problem list, medication list, and allergies have been reviewed.  The patient has an allergy to amoxicillin which causes hives.      Review of systems:  Review of systems is otherwise negative.    Physical Exam  Vitals and nursing note reviewed.   Constitutional:       General: She is not in acute distress.     Appearance: She is not ill-appearing, toxic-appearing or diaphoretic.   HENT:      Head: Atraumatic.      Right Ear: Ear canal normal.      Nose: Nose normal. No congestion.      Mouth/Throat:      Mouth: Mucous membranes are moist.      Pharynx: Oropharynx is clear. No oropharyngeal exudate or posterior oropharyngeal erythema.   Eyes:      General: No scleral icterus.     Extraocular Movements: Extraocular movements intact.      Conjunctiva/sclera: Conjunctivae normal.      Pupils: Pupils are equal, round, and reactive to light.      Comments: Fundi without exudate hemorrhage or scar.  Retinal vessels are normal.   Neck:      Vascular: No carotid bruit.      Comments: Thyroid is without enlargement nodule or bruit  Cardiovascular:      Rate and Rhythm: Normal rate.      Pulses: Normal pulses.      Heart sounds: Normal heart sounds. No murmur heard.     No gallop.   Pulmonary:      Effort: Pulmonary effort is normal. No respiratory distress.      Breath sounds: Normal breath sounds. No wheezing, rhonchi or rales.   Abdominal:      General: Abdomen is flat. Bowel sounds are normal.      Palpations: Abdomen is soft.      Tenderness: There is no abdominal tenderness.      Comments: No hepatosplenomegaly   Musculoskeletal:         General: No swelling or tenderness. Normal range of motion.      Cervical back:  Neck supple. No tenderness.      Right lower leg: No edema.      Left lower leg: No edema.   Lymphadenopathy:      Cervical: No cervical adenopathy.   Skin:     Coloration: Skin is not jaundiced or pale.      Findings: No rash.   Neurological:      General: No focal deficit present.      Mental Status: She is alert and oriented to person, place, and time.   Psychiatric:         Mood and Affect: Mood normal.         Behavior: Behavior normal.      Assessment/plan:  ADHD: The patient has ADHD is stable.  She does not take drug holidays.  The medication continues to be helpful in combination with the BuSpar the patient has noted several beneficial effects including reduction of migraine as well as decrease in her anxiety.  Plan: Continue current management   Follow-up for ADHD in 3 months   Medication was refilled.      Elevated BMI:  The patient has achieved a normal weight.  She is being weaned from semaglutide.  She has not able to tolerate metformin which may prevent later progression to diabetes and has other health benefits.  No other such drug currently would have that benefit.  Possible alternatives that may need to be considered would be the DLP 4 such as Januvia.    Acid reflux:  The patient's symptoms were controlled on 40 mg and she wishes to wean to 20 mg which should also work.  Plan: Reviewed above with patient   The patient has been warned that initially she may experience some heartburn until she gets use to the lower dose in her body may then start reducing less acid or she may have less reflux    Migraine:  The patient states with BuSpar and ADHD her symptoms of headaches have been reduced in intensity, severity, and frequency.  When she does get a headache these seem to be relieved with the combination of Tylenol and Zofran.  Plan: Continue current management

## 2024-05-24 DIAGNOSIS — E66.3 OVERWEIGHT (BMI 25.0-29.9): ICD-10-CM

## 2024-05-24 DIAGNOSIS — E88.819 INSULIN RESISTANCE: ICD-10-CM

## 2024-05-24 RX ORDER — SEMAGLUTIDE 1.34 MG/ML
1 INJECTION, SOLUTION SUBCUTANEOUS
Qty: 3 ML | Refills: 2 | Status: SHIPPED | OUTPATIENT
Start: 2024-05-24 | End: 2024-08-10

## 2024-06-05 ENCOUNTER — PATIENT MESSAGE (OUTPATIENT)
Dept: OPTOMETRY | Facility: CLINIC | Age: 29
End: 2024-06-05
Payer: COMMERCIAL

## 2024-06-10 RX ORDER — DEXTROAMPHETAMINE SACCHARATE, AMPHETAMINE ASPARTATE MONOHYDRATE, DEXTROAMPHETAMINE SULFATE AND AMPHETAMINE SULFATE 7.5; 7.5; 7.5; 7.5 MG/1; MG/1; MG/1; MG/1
30 CAPSULE, EXTENDED RELEASE ORAL EVERY MORNING
Qty: 30 CAPSULE | Refills: 0 | Status: SHIPPED | OUTPATIENT
Start: 2024-06-10 | End: 2024-07-17

## 2024-07-03 ENCOUNTER — OFFICE VISIT (OUTPATIENT)
Dept: OPTOMETRY | Facility: CLINIC | Age: 29
End: 2024-07-03
Payer: COMMERCIAL

## 2024-07-03 DIAGNOSIS — H04.123 DRY EYE SYNDROME OF BOTH EYES: Primary | ICD-10-CM

## 2024-07-03 PROCEDURE — 1160F RVW MEDS BY RX/DR IN RCRD: CPT | Mod: CPTII,S$GLB,, | Performed by: OPTOMETRIST

## 2024-07-03 PROCEDURE — 92012 INTRM OPH EXAM EST PATIENT: CPT | Mod: S$GLB,,, | Performed by: OPTOMETRIST

## 2024-07-03 PROCEDURE — 99999 PR PBB SHADOW E&M-EST. PATIENT-LVL II: CPT | Mod: PBBFAC,,, | Performed by: OPTOMETRIST

## 2024-07-03 PROCEDURE — 1159F MED LIST DOCD IN RCRD: CPT | Mod: CPTII,S$GLB,, | Performed by: OPTOMETRIST

## 2024-07-03 NOTE — PROGRESS NOTES
HPI    SADIE: 3/20/2024  Chief complaint (CC): 30 yo F here for urgent care visit. Pt reports she   has a CL stuck in the left eye. Contact has been stuck OS since 6/22/2024.   She has been using ofloxacin gtts prn for pain OS. Pt denies any other   ocular or visual complaints today.     Glasses? +  Contacts? +  H/o eye surgery, injections or laser: -  H/o eye injury: -  Known eye conditions? -  Family h/o eye conditions? -  Eye gtts? +   OFLOXACIN TID OS       (-) Flashes (-)  Floaters (-) Mucous   (-)  Tearing (-) Itching (-) Burning   (++) Headaches (++) Eye Pain/discomfort (+) Irritation   (-)  Redness (-) Double vision (-) Blurry vision    Diabetic? -  A1c? -      Last edited by Poonam Graves, OD on 7/3/2024  1:29 PM.            Assessment /Plan     For exam results, see Encounter Report.        Dry eye syndrome of both eyes   - No CL observed with lid eversion OS.  - Discontinue Ofloxacin TID OS   - Recommend artificial tears. 1 drop 2-4x per day.   - Proper CL hygiene discussed.

## 2024-07-10 RX ORDER — DEXTROAMPHETAMINE SACCHARATE, AMPHETAMINE ASPARTATE MONOHYDRATE, DEXTROAMPHETAMINE SULFATE AND AMPHETAMINE SULFATE 7.5; 7.5; 7.5; 7.5 MG/1; MG/1; MG/1; MG/1
30 CAPSULE, EXTENDED RELEASE ORAL EVERY MORNING
Qty: 30 CAPSULE | Refills: 0 | Status: SHIPPED | OUTPATIENT
Start: 2024-07-10 | End: 2024-08-09

## 2024-07-16 ENCOUNTER — PATIENT MESSAGE (OUTPATIENT)
Dept: OPTOMETRY | Facility: CLINIC | Age: 29
End: 2024-07-16
Payer: COMMERCIAL

## 2024-07-26 ENCOUNTER — OFFICE VISIT (OUTPATIENT)
Dept: PRIMARY CARE CLINIC | Facility: CLINIC | Age: 29
End: 2024-07-26
Payer: COMMERCIAL

## 2024-07-26 VITALS
OXYGEN SATURATION: 97 % | BODY MASS INDEX: 22.74 KG/M2 | SYSTOLIC BLOOD PRESSURE: 114 MMHG | RESPIRATION RATE: 19 BRPM | TEMPERATURE: 97 F | HEIGHT: 64 IN | WEIGHT: 133.19 LBS | DIASTOLIC BLOOD PRESSURE: 72 MMHG | HEART RATE: 96 BPM

## 2024-07-26 DIAGNOSIS — F90.1 ADHD, HYPERACTIVE-IMPULSIVE TYPE: Primary | ICD-10-CM

## 2024-07-26 RX ORDER — BUSPIRONE HYDROCHLORIDE 7.5 MG/1
7.5 TABLET ORAL 2 TIMES DAILY
Qty: 60 TABLET | Refills: 3 | Status: SHIPPED | OUTPATIENT
Start: 2024-07-26 | End: 2025-07-26

## 2024-07-26 NOTE — PATIENT INSTRUCTIONS
Thank you for visiting today.  It was not clear to me based on your history that the ADHD medicine is not optimized.  I heard you say the buspirone may be baking tired and I have reduced the dose to 7.5 mg.  I have also recommended that you consider better sleep hygiene as we discussed with you preparing for bed and then going directly to bed.  Try not to watch TV or use any device including your phone which has a screened to it.  We talked briefly about visualization techniques were you put yourself and a place where you relax such as a beach and think about being there as opposed to being at work or other places  Also in terms of fatigue I note your on omeprazole in some individuals it will interfere with vitamin absorption.  I recommend that you take a sublingual B12 which can be gotten over-the-counter and also consider a daily multiple vitamin with iron.  If you are fatigue continues than I am likely to do some lab work.  Also sometimes your body gets used to an ADHD medicine and I suggest than on the days you do not work you try not to take the medication at least temporarily and see if that helps as well.

## 2024-07-26 NOTE — PROGRESS NOTES
29-year-old woman with a history of ADHD dysthymia, migraine, normalized BMI on semaglutide and currently remains on 1 mg here for follow-up of ADHD.    Pertinent review of systems:  Psych issues:  The patient continues to have issues with ADHD, dysthymia, mood changes, and anxiety.  Medications are being optimized so that the patient today complained of increased drowsiness with the buspirone.  She is wondering at the same time whether or not the Adderall dose is sufficient.  She has had some issues with anger management.  The patient again has a single mother and has a 7-year-old daughter who is doing well but it creates additional stress and responsibilities for the patient to manage.  She notes no side effects from the medications such as palpitations, shortness a breath, atypical chest pain.  She notes no tremors    Problem list, medication list, and allergies have been reviewed.  The patient has noted is allergic to amoxicillin which causes hives    Remaining review of systems is unremarkable.  The patient did not complain of headaches.  The patient notes she continues take omeprazole for GERD.    Physical Exam  Vitals and nursing note reviewed.   Constitutional:       General: She is not in acute distress.     Appearance: Normal appearance. She is obese. She is not ill-appearing, toxic-appearing or diaphoretic.   HENT:      Head: Atraumatic.      Right Ear: External ear normal.      Left Ear: Ear canal and external ear normal.      Nose: Nose normal. No congestion.      Mouth/Throat:      Mouth: Mucous membranes are moist.      Pharynx: Oropharynx is clear. No posterior oropharyngeal erythema.   Eyes:      General: No scleral icterus.     Extraocular Movements: Extraocular movements intact.      Conjunctiva/sclera: Conjunctivae normal.      Pupils: Pupils are equal, round, and reactive to light.      Comments: Fundi without exudate hemorrhage or scar.  Retinal vessels are normal.   Neck:      Vascular: No  carotid bruit.      Comments: Thyroid without enlargement, nodule, or bruit  Cardiovascular:      Rate and Rhythm: Normal rate and regular rhythm.      Pulses: Normal pulses.      Heart sounds: Normal heart sounds. No murmur heard.     No gallop.   Pulmonary:      Effort: Pulmonary effort is normal. No respiratory distress.      Breath sounds: Normal breath sounds. No wheezing, rhonchi or rales.   Abdominal:      General: Bowel sounds are normal.      Palpations: Abdomen is soft.      Tenderness: There is no abdominal tenderness. There is no right CVA tenderness or left CVA tenderness.   Musculoskeletal:         General: No tenderness. Normal range of motion.      Cervical back: Neck supple. No tenderness.      Right lower leg: No edema.      Left lower leg: No edema.   Lymphadenopathy:      Cervical: No cervical adenopathy.   Skin:     General: Skin is warm.      Coloration: Skin is not jaundiced or pale.   Neurological:      General: No focal deficit present.      Mental Status: She is alert and oriented to person, place, and time.   Psychiatric:         Mood and Affect: Mood normal.         Behavior: Behavior normal.      Assessment/plan:  Psych issues:  The patient notes several issues today.  She noted fatigue which she attributed to the buspirone in the dose will be decreased.  She noticed difficulty controlling anger and aggression which could be directly related to the Adderall in his I explained to her 1 of the reasons I am reluctant to increase it.  Also she complains of some insomnia which may also be related given her smaller body size in the extended release medication.  Plan: Reviewed above with patient   Buspirone 7.5 mg twice a day   B12 sublingually underneath the tongue and a multiple vitamin with iron because of the reduced absorption that may occur with the omeprazole.  Discussed sleep hygiene as well as visual imagery for sleeping.  Discussed a drug holiday or the patient does not take her ADHD  medicine on the day she does not work.  This may reduce the chance of a possible tachyphylaxis.  The patient should follow-up in 3 months

## 2024-08-13 RX ORDER — DEXTROAMPHETAMINE SACCHARATE, AMPHETAMINE ASPARTATE MONOHYDRATE, DEXTROAMPHETAMINE SULFATE AND AMPHETAMINE SULFATE 7.5; 7.5; 7.5; 7.5 MG/1; MG/1; MG/1; MG/1
30 CAPSULE, EXTENDED RELEASE ORAL EVERY MORNING
Qty: 30 CAPSULE | Refills: 0 | Status: SHIPPED | OUTPATIENT
Start: 2024-08-13 | End: 2024-09-14

## 2024-08-14 ENCOUNTER — PATIENT MESSAGE (OUTPATIENT)
Dept: PRIMARY CARE CLINIC | Facility: CLINIC | Age: 29
End: 2024-08-14
Payer: COMMERCIAL

## 2024-09-13 DIAGNOSIS — E88.819 INSULIN RESISTANCE: ICD-10-CM

## 2024-09-13 DIAGNOSIS — E66.3 OVERWEIGHT (BMI 25.0-29.9): ICD-10-CM

## 2024-09-13 RX ORDER — DEXTROAMPHETAMINE SACCHARATE, AMPHETAMINE ASPARTATE MONOHYDRATE, DEXTROAMPHETAMINE SULFATE AND AMPHETAMINE SULFATE 7.5; 7.5; 7.5; 7.5 MG/1; MG/1; MG/1; MG/1
30 CAPSULE, EXTENDED RELEASE ORAL EVERY MORNING
Qty: 30 CAPSULE | Refills: 0 | Status: SHIPPED | OUTPATIENT
Start: 2024-09-13 | End: 2024-10-13

## 2024-09-13 RX ORDER — SEMAGLUTIDE 1.34 MG/ML
1 INJECTION, SOLUTION SUBCUTANEOUS
Qty: 3 ML | Refills: 2 | Status: SHIPPED | OUTPATIENT
Start: 2024-09-13 | End: 2024-11-30

## 2024-10-07 NOTE — H&P
[facility]  Obstetrics  Post delivery note     Patient Name: Rhea Gilman  MRN: [id number]  Admission Date: 2017  Primary Care Provider: Carole William MD    Subjective:     Principal Problem:Pregnancy at 39 1/7 weeks    History of Present Illness:  Patient presents to L&D for elective induction of labor at 39 weeks. She had episode of atrial fibrillation about 3 weeks ago and was cardio converted at [facility]. She has not had any problems since but wanted to be delivered as soon as possible so she did not have to do a lot of pushing during delivery      ASSESSMENT: normal vaginal delivery    PLAN  Routine postpartal care    Assessment/Plan:     22 y.o. female  at 39w1d for:elective induction of labor    Pregnancy     without complications    PLAN:  Routine post partum care        Atrial fibrillation    Resolved before delivery            Evangleist Moncada MD  Obstetrics  [facility]       The patient is a 16y Male complaining of allergic reaction.

## 2024-10-08 DIAGNOSIS — R11.0 NAUSEA: ICD-10-CM

## 2024-10-08 RX ORDER — ONDANSETRON 8 MG/1
8 TABLET, ORALLY DISINTEGRATING ORAL EVERY 6 HOURS PRN
Qty: 30 TABLET | Refills: 1 | Status: SHIPPED | OUTPATIENT
Start: 2024-10-08

## 2024-10-11 ENCOUNTER — E-VISIT (OUTPATIENT)
Dept: PRIMARY CARE CLINIC | Facility: CLINIC | Age: 29
End: 2024-10-11
Payer: COMMERCIAL

## 2024-10-11 ENCOUNTER — OFFICE VISIT (OUTPATIENT)
Dept: PRIMARY CARE CLINIC | Facility: CLINIC | Age: 29
End: 2024-10-11
Payer: COMMERCIAL

## 2024-10-11 VITALS
SYSTOLIC BLOOD PRESSURE: 107 MMHG | OXYGEN SATURATION: 100 % | WEIGHT: 140.63 LBS | BODY MASS INDEX: 24.01 KG/M2 | HEART RATE: 98 BPM | DIASTOLIC BLOOD PRESSURE: 72 MMHG | RESPIRATION RATE: 18 BRPM | HEIGHT: 64 IN

## 2024-10-11 DIAGNOSIS — R30.0 DYSURIA: Primary | ICD-10-CM

## 2024-10-11 DIAGNOSIS — N76.0 BACTERIAL VAGINOSIS: ICD-10-CM

## 2024-10-11 DIAGNOSIS — B96.89 BACTERIAL VAGINOSIS: ICD-10-CM

## 2024-10-11 PROCEDURE — 87086 URINE CULTURE/COLONY COUNT: CPT | Performed by: INTERNAL MEDICINE

## 2024-10-11 RX ORDER — METRONIDAZOLE 500 MG/1
500 TABLET ORAL 2 TIMES DAILY
Qty: 10 TABLET | Refills: 0 | Status: SHIPPED | OUTPATIENT
Start: 2024-10-11 | End: 2024-10-16

## 2024-10-11 NOTE — PROGRESS NOTES
29-year-old woman with a history of ADHD, dysthymia, migraine, normal his BMI and continues on semaglutide here for possible UTI.      History of present illness and pertinent review of systems:  The patient has symptoms of dysuria for a week.  The patient has no fever but has felt chilled and sweats.  The patient has noted some nausea but she was on semaglutide.  The patient has low back pain but not CVA pain.  She has urgency, frequency, had nocturia x3.  Typically the patient gets up once.  The patient notes increased urine odor.  The symptoms are initially with the urination.  She notes no blood in her urine.  The patient notes vaginal discharge with increased odor which has a fishy smell..  She notes the discharge has an odor.  The patient has been sexually active but does practice safe sex.  She has had no intimate relations recently or in proximity to her symptoms.  The patient intermittently uses tampons but not with her last menstrual cycle.    The patient has not had a UTI for least the past year.  The patient has been increasing her fluids and has had cranberry juice.    Problem list, medication list, and allergies have been reviewed.  The patient develops hives from amoxicillin.      Remaining review of systems:    Physical Exam  Vitals and nursing note reviewed.   Constitutional:       General: She is not in acute distress.     Appearance: Normal appearance. She is normal weight. She is not ill-appearing, toxic-appearing or diaphoretic.   HENT:      Head: Atraumatic.      Nose: Nose normal. No congestion.      Mouth/Throat:      Mouth: Mucous membranes are moist.      Pharynx: Oropharynx is clear. No posterior oropharyngeal erythema.   Eyes:      General: No scleral icterus.     Conjunctiva/sclera: Conjunctivae normal.   Cardiovascular:      Rate and Rhythm: Normal rate and regular rhythm.      Pulses: Normal pulses.      Heart sounds: Normal heart sounds. No murmur heard.     No gallop.   Pulmonary:       Effort: Pulmonary effort is normal. No respiratory distress.      Breath sounds: Normal breath sounds. No wheezing, rhonchi or rales.   Abdominal:      General: Bowel sounds are normal.      Palpations: Abdomen is soft.      Tenderness: There is no abdominal tenderness. There is no right CVA tenderness or left CVA tenderness.      Comments: No suprapubic tenderness.  There was left adnexal tenderness the patient has a known ovarian cyst   Musculoskeletal:         General: No tenderness. Normal range of motion.      Cervical back: No tenderness.      Right lower leg: No edema.      Left lower leg: No edema.   Lymphadenopathy:      Cervical: No cervical adenopathy.   Skin:     Coloration: Skin is not jaundiced or pale.   Neurological:      General: No focal deficit present.      Mental Status: She is alert and oriented to person, place, and time.      Gait: Gait normal.   Psychiatric:         Mood and Affect: Mood normal.         Behavior: Behavior normal.        POCT testing:  Urine dipstick:  The urine is clean catch in his straw colored.  It was slightly hazy.  There are trace leukocytes.  The patient has specific gravity is 1.005, pH of 7.  Nitrites protein, glucose, ketones, urobilinogen, bilirubin, and blood are all negative    Assessment/plan:  Dysuria:  The patient has dysuria may be related to a UTI, however her urine dipstick was unremarkable.  The history of pain with the initial urination as well as vaginal discharge and increased odor may suggest a bacterial vaginosis/vaginitis as a cause.  Plan:  Reviewed above with patient   Azo as directed   Cranberry juice or cranberry tablets.  The patient may also dilute cranberry juice to avoid the excess calories   If the patient should develop fever to 102, CVA pain the patient should contact me.  Trial of metronidazole 500 mg b.i.d. for 5 days

## 2024-10-11 NOTE — PATIENT INSTRUCTIONS
Thank you for visiting today.    Based on the urine dipstick I do not believe you have a urinary tract infection.  History did reveal the possibility of a bacterial vaginal infection and therefore I have prescribed Flagyl for you for 5 days.  Please let me know the outcome.  I have also sent a urine culture to exclude a UTI.  Please remember do not drink alcohol when taking Flagyl   You should drink enough fluid so that your urine is a pale color  You also may take azo as directed  You can also add cranberry juice and cranberry tablets.  If you take cranberry tablets or capsules take them twice daily.  Should you develop a fever of 102 or flank pain that may be a warning that this was a kidney infection and please contact me.

## 2024-10-12 LAB — BACTERIA UR CULT: ABNORMAL

## 2024-10-14 ENCOUNTER — PATIENT MESSAGE (OUTPATIENT)
Dept: PRIMARY CARE CLINIC | Facility: CLINIC | Age: 29
End: 2024-10-14
Payer: COMMERCIAL

## 2024-10-15 ENCOUNTER — PATIENT MESSAGE (OUTPATIENT)
Dept: PRIMARY CARE CLINIC | Facility: CLINIC | Age: 29
End: 2024-10-15
Payer: COMMERCIAL

## 2024-10-15 RX ORDER — GRANULES FOR ORAL 3 G/1
3 POWDER ORAL ONCE
Qty: 3 G | Refills: 0 | Status: SHIPPED | OUTPATIENT
Start: 2024-10-15 | End: 2024-10-17

## 2024-10-15 NOTE — PROGRESS NOTES
The patient is still symptomatic from her UTI though her colony count is less than 100,000 in only approaches 50,000. She is allergic to penicillins and therefore the drug of choice can not be used which would be Augmentin.  Vancomycin would not be an option given her lack of systemic symptoms.  And though sensitive the Macrodantin is unlikely to cure this infection.  Review of the literature suggests that fosfomycin may be a useful alternative and a 3 g single dose was sent to her pharmacy.

## 2024-10-31 DIAGNOSIS — F90.1 ADHD, HYPERACTIVE-IMPULSIVE TYPE: Primary | ICD-10-CM

## 2024-10-31 RX ORDER — DEXTROAMPHETAMINE SACCHARATE, AMPHETAMINE ASPARTATE MONOHYDRATE, DEXTROAMPHETAMINE SULFATE AND AMPHETAMINE SULFATE 7.5; 7.5; 7.5; 7.5 MG/1; MG/1; MG/1; MG/1
30 CAPSULE, EXTENDED RELEASE ORAL EVERY MORNING
Qty: 30 CAPSULE | Refills: 0 | Status: SHIPPED | OUTPATIENT
Start: 2024-10-31 | End: 2024-12-01

## 2024-12-01 DIAGNOSIS — F90.1 ADHD, HYPERACTIVE-IMPULSIVE TYPE: ICD-10-CM

## 2024-12-02 DIAGNOSIS — F90.1 ADHD, HYPERACTIVE-IMPULSIVE TYPE: ICD-10-CM

## 2024-12-02 RX ORDER — DEXTROAMPHETAMINE SACCHARATE, AMPHETAMINE ASPARTATE MONOHYDRATE, DEXTROAMPHETAMINE SULFATE AND AMPHETAMINE SULFATE 7.5; 7.5; 7.5; 7.5 MG/1; MG/1; MG/1; MG/1
30 CAPSULE, EXTENDED RELEASE ORAL EVERY MORNING
Qty: 30 CAPSULE | Refills: 0 | Status: SHIPPED | OUTPATIENT
Start: 2024-12-02 | End: 2025-01-03

## 2024-12-11 DIAGNOSIS — E66.3 OVERWEIGHT (BMI 25.0-29.9): ICD-10-CM

## 2024-12-11 DIAGNOSIS — E88.819 INSULIN RESISTANCE: ICD-10-CM

## 2024-12-11 RX ORDER — SEMAGLUTIDE 1.34 MG/ML
1 INJECTION, SOLUTION SUBCUTANEOUS
Qty: 3 ML | Refills: 2 | Status: SHIPPED | OUTPATIENT
Start: 2024-12-11 | End: 2025-02-27

## 2024-12-30 DIAGNOSIS — F90.1 ADHD, HYPERACTIVE-IMPULSIVE TYPE: ICD-10-CM

## 2024-12-30 RX ORDER — DEXTROAMPHETAMINE SACCHARATE, AMPHETAMINE ASPARTATE MONOHYDRATE, DEXTROAMPHETAMINE SULFATE AND AMPHETAMINE SULFATE 7.5; 7.5; 7.5; 7.5 MG/1; MG/1; MG/1; MG/1
30 CAPSULE, EXTENDED RELEASE ORAL EVERY MORNING
Qty: 30 CAPSULE | Refills: 0 | Status: CANCELLED | OUTPATIENT
Start: 2024-12-30 | End: 2025-01-29

## 2024-12-30 RX ORDER — BUSPIRONE HYDROCHLORIDE 7.5 MG/1
7.5 TABLET ORAL 2 TIMES DAILY
Qty: 60 TABLET | Refills: 3 | Status: CANCELLED | OUTPATIENT
Start: 2024-12-30 | End: 2025-12-30

## 2024-12-31 RX ORDER — BUSPIRONE HYDROCHLORIDE 7.5 MG/1
7.5 TABLET ORAL 2 TIMES DAILY
Qty: 60 TABLET | Refills: 3 | OUTPATIENT
Start: 2024-12-31 | End: 2025-12-31

## 2024-12-31 RX ORDER — DEXTROAMPHETAMINE SACCHARATE, AMPHETAMINE ASPARTATE MONOHYDRATE, DEXTROAMPHETAMINE SULFATE AND AMPHETAMINE SULFATE 7.5; 7.5; 7.5; 7.5 MG/1; MG/1; MG/1; MG/1
30 CAPSULE, EXTENDED RELEASE ORAL EVERY MORNING
Qty: 30 CAPSULE | Refills: 0 | OUTPATIENT
Start: 2024-12-31 | End: 2025-01-30

## 2025-01-02 ENCOUNTER — PATIENT MESSAGE (OUTPATIENT)
Dept: PRIMARY CARE CLINIC | Facility: CLINIC | Age: 30
End: 2025-01-02
Payer: COMMERCIAL

## 2025-01-02 DIAGNOSIS — F90.1 ADHD, HYPERACTIVE-IMPULSIVE TYPE: ICD-10-CM

## 2025-01-02 RX ORDER — BUSPIRONE HYDROCHLORIDE 7.5 MG/1
7.5 TABLET ORAL 2 TIMES DAILY
Qty: 60 TABLET | Refills: 3 | Status: CANCELLED | OUTPATIENT
Start: 2025-01-02 | End: 2026-01-02

## 2025-01-02 RX ORDER — DEXTROAMPHETAMINE SACCHARATE, AMPHETAMINE ASPARTATE MONOHYDRATE, DEXTROAMPHETAMINE SULFATE AND AMPHETAMINE SULFATE 7.5; 7.5; 7.5; 7.5 MG/1; MG/1; MG/1; MG/1
30 CAPSULE, EXTENDED RELEASE ORAL EVERY MORNING
Qty: 30 CAPSULE | Refills: 0 | Status: CANCELLED | OUTPATIENT
Start: 2025-01-02 | End: 2025-02-01

## 2025-01-03 DIAGNOSIS — F90.1 ADHD, HYPERACTIVE-IMPULSIVE TYPE: ICD-10-CM

## 2025-01-03 RX ORDER — BUSPIRONE HYDROCHLORIDE 7.5 MG/1
7.5 TABLET ORAL 2 TIMES DAILY
Qty: 60 TABLET | Refills: 3 | Status: CANCELLED | OUTPATIENT
Start: 2025-01-02 | End: 2026-01-02

## 2025-01-03 RX ORDER — DEXTROAMPHETAMINE SACCHARATE, AMPHETAMINE ASPARTATE MONOHYDRATE, DEXTROAMPHETAMINE SULFATE AND AMPHETAMINE SULFATE 7.5; 7.5; 7.5; 7.5 MG/1; MG/1; MG/1; MG/1
30 CAPSULE, EXTENDED RELEASE ORAL EVERY MORNING
Qty: 30 CAPSULE | Refills: 0 | Status: SHIPPED | OUTPATIENT
Start: 2025-01-03 | End: 2025-02-02

## 2025-01-03 RX ORDER — BUSPIRONE HYDROCHLORIDE 7.5 MG/1
7.5 TABLET ORAL 2 TIMES DAILY
Qty: 60 TABLET | Refills: 3 | Status: SHIPPED | OUTPATIENT
Start: 2025-01-03 | End: 2026-01-03

## 2025-01-03 NOTE — PROGRESS NOTES
Rx was sent yesterday both for BuSpar and dextroamphetamine or Adderall however the pharmacy said they did not receive it.  New Rx is were sent

## 2025-01-03 NOTE — PROGRESS NOTES
The patient message me regarding her medication which he had already been refilled.  No orders were completed for this encounter

## 2025-01-27 ENCOUNTER — PATIENT MESSAGE (OUTPATIENT)
Dept: BARIATRICS | Facility: CLINIC | Age: 30
End: 2025-01-27
Payer: COMMERCIAL

## 2025-02-03 ENCOUNTER — OFFICE VISIT (OUTPATIENT)
Dept: PRIMARY CARE CLINIC | Facility: CLINIC | Age: 30
End: 2025-02-03
Payer: COMMERCIAL

## 2025-02-03 VITALS
HEART RATE: 86 BPM | BODY MASS INDEX: 24.01 KG/M2 | TEMPERATURE: 98 F | OXYGEN SATURATION: 99 % | DIASTOLIC BLOOD PRESSURE: 76 MMHG | WEIGHT: 140.63 LBS | SYSTOLIC BLOOD PRESSURE: 112 MMHG | RESPIRATION RATE: 16 BRPM | HEIGHT: 64 IN

## 2025-02-03 DIAGNOSIS — F90.1 ATTENTION DEFICIT HYPERACTIVITY DISORDER (ADHD), PREDOMINANTLY HYPERACTIVE TYPE: ICD-10-CM

## 2025-02-03 DIAGNOSIS — K21.9 GASTROESOPHAGEAL REFLUX DISEASE, UNSPECIFIED WHETHER ESOPHAGITIS PRESENT: Primary | ICD-10-CM

## 2025-02-03 DIAGNOSIS — F90.1 ADHD, HYPERACTIVE-IMPULSIVE TYPE: ICD-10-CM

## 2025-02-03 DIAGNOSIS — R73.9 ELEVATED BLOOD SUGAR: ICD-10-CM

## 2025-02-03 PROCEDURE — 99499 UNLISTED E&M SERVICE: CPT | Mod: S$GLB,,, | Performed by: INTERNAL MEDICINE

## 2025-02-03 RX ORDER — DEXTROAMPHETAMINE SACCHARATE, AMPHETAMINE ASPARTATE MONOHYDRATE, DEXTROAMPHETAMINE SULFATE AND AMPHETAMINE SULFATE 7.5; 7.5; 7.5; 7.5 MG/1; MG/1; MG/1; MG/1
30 CAPSULE, EXTENDED RELEASE ORAL EVERY MORNING
Qty: 30 CAPSULE | Refills: 0 | Status: SHIPPED | OUTPATIENT
Start: 2025-02-03 | End: 2025-02-27 | Stop reason: SDUPTHER

## 2025-02-03 NOTE — PROGRESS NOTES
29 Year old woman with a history of ADHD, dysthymia, migraine, normal BMI now off semaglutide, acid reflux, here for follow-up.    Pertinent review of systems:  ADHD:  The patient states she has gotten a book on cognitive behavioral therapy as well as taking meds.  She currently is on Adderall XR 30 mg daily.  She has been able to focus better in the anxiety around multiple issues has improved with BuSpar.  She complains of no palpitations and notes that her pulses actually lower than it has been.  The patient has 1-2 caffeinated beverages per week.  She has no tremors.  She notes that she is better able to assist her daughter with homework etc. and is doing better when it comes to her job.    Acid reflux:  The patient notes she uses omeprazole approximally 3 times a week.  She has not tried an H2 blocker such as famotidine at this time.  She notes no sour taste in her mouth, unexplained cough, change in voice, or sensation of food sticking in her mid chest or issues with swallowing.    Elevated BMI:  The patient is now off semaglutide.  She notes that she is now again having cravings particularly for salty snacks such as chips.  She has no excess hunger, thirst, but describes polyuria.  There is no chronic vaginal discharge.  There was no changes vision.  The patient at 1 time did have an elevated hemoglobin A1c consistent with glucose intolerance.  She was on metformin in the past but could not tolerate the GI effects.  She is concerned about weight gain again and potential for diabetes.    Migraine:  The patient last had a headache this week.  She typically takes Tylenol and ibuprofen together which causes the headache to yris.  The pattern of the headache, the frequency of the headache, and durations have not changed    Problem list, medication list, and allergies have been reviewed.  The patient developed hives from amoxicillin.    Review of systems:  Review of systems is otherwise negative    Physical  Exam  Vitals reviewed.   Constitutional:       General: She is not in acute distress.     Appearance: She is normal weight. She is not ill-appearing, toxic-appearing or diaphoretic.   HENT:      Head: Atraumatic.   Neurological:      General: No focal deficit present.      Mental Status: She is alert and oriented to person, place, and time.   Psychiatric:         Mood and Affect: Mood normal.         Behavior: Behavior normal.      Assessment/plan:  ADHD:  The patient is doing well in her current ADHD medication.  There are no obvious side effects based on history and exam.    Plan: Reviewed above with patient   Continue current management.  Follow-up every 3 months for this medication.    Elevated BMI:  The patient describes food noise that was not present on semaglutide.  The bariatric program stopped this prescription because she has achieved her normal BMI.  He states she has gained 4 lb in the last 2 weeks in she has begun eating things that she was not eating previously.  The patient was unable to tolerate metformin.  There is a family history of diabetes.    Plan: Reviewed above with patient   Hemoglobin A1c  Consider the use of an SGLT2 inhibitor which will not do anything for her cravings but may help control her blood sugar in her prior glucose intolerance which is likely to resume.    Acid reflux:  The patient has mild acid reflux.  Recent findings about potential long-term side effects of PPIs were reviewed.    Plan: Reviewed above with patient   The patient should switch from omeprazole to famotidine 20 mg PRN

## 2025-02-04 ENCOUNTER — LAB VISIT (OUTPATIENT)
Dept: LAB | Facility: HOSPITAL | Age: 30
End: 2025-02-04
Payer: COMMERCIAL

## 2025-02-04 ENCOUNTER — TELEPHONE (OUTPATIENT)
Dept: PRIMARY CARE CLINIC | Facility: CLINIC | Age: 30
End: 2025-02-04
Payer: COMMERCIAL

## 2025-02-04 DIAGNOSIS — K21.9 GASTROESOPHAGEAL REFLUX DISEASE, UNSPECIFIED WHETHER ESOPHAGITIS PRESENT: ICD-10-CM

## 2025-02-04 DIAGNOSIS — R73.9 ELEVATED BLOOD SUGAR: ICD-10-CM

## 2025-02-04 LAB
ALBUMIN SERPL BCP-MCNC: 3.6 G/DL (ref 3.5–5.2)
ALP SERPL-CCNC: 37 U/L (ref 40–150)
ALT SERPL W/O P-5'-P-CCNC: 30 U/L (ref 10–44)
ANION GAP SERPL CALC-SCNC: 8 MMOL/L (ref 8–16)
AST SERPL-CCNC: 19 U/L (ref 10–40)
BASOPHILS # BLD AUTO: 0.05 K/UL (ref 0–0.2)
BASOPHILS NFR BLD: 0.9 % (ref 0–1.9)
BILIRUB SERPL-MCNC: 0.6 MG/DL (ref 0.1–1)
BUN SERPL-MCNC: 10 MG/DL (ref 6–20)
CALCIUM SERPL-MCNC: 8.7 MG/DL (ref 8.7–10.5)
CHLORIDE SERPL-SCNC: 104 MMOL/L (ref 95–110)
CHOLEST SERPL-MCNC: 148 MG/DL (ref 120–199)
CHOLEST/HDLC SERPL: 2.6 {RATIO} (ref 2–5)
CO2 SERPL-SCNC: 26 MMOL/L (ref 23–29)
CREAT SERPL-MCNC: 0.7 MG/DL (ref 0.5–1.4)
DIFFERENTIAL METHOD BLD: NORMAL
EOSINOPHIL # BLD AUTO: 0.3 K/UL (ref 0–0.5)
EOSINOPHIL NFR BLD: 5.8 % (ref 0–8)
ERYTHROCYTE [DISTWIDTH] IN BLOOD BY AUTOMATED COUNT: 12.8 % (ref 11.5–14.5)
EST. GFR  (NO RACE VARIABLE): >60 ML/MIN/1.73 M^2
ESTIMATED AVG GLUCOSE: 88 MG/DL (ref 68–131)
FERRITIN SERPL-MCNC: 23 NG/ML (ref 20–300)
GLUCOSE SERPL-MCNC: 87 MG/DL (ref 70–110)
HBA1C MFR BLD: 4.7 % (ref 4–5.6)
HCT VFR BLD AUTO: 39 % (ref 37–48.5)
HDLC SERPL-MCNC: 58 MG/DL (ref 40–75)
HDLC SERPL: 39.2 % (ref 20–50)
HGB BLD-MCNC: 12.5 G/DL (ref 12–16)
IMM GRANULOCYTES # BLD AUTO: 0.01 K/UL (ref 0–0.04)
IMM GRANULOCYTES NFR BLD AUTO: 0.2 % (ref 0–0.5)
LDLC SERPL CALC-MCNC: 73.4 MG/DL (ref 63–159)
LYMPHOCYTES # BLD AUTO: 1.9 K/UL (ref 1–4.8)
LYMPHOCYTES NFR BLD: 33.6 % (ref 18–48)
MAGNESIUM SERPL-MCNC: 1.9 MG/DL (ref 1.6–2.6)
MCH RBC QN AUTO: 30.9 PG (ref 27–31)
MCHC RBC AUTO-ENTMCNC: 32.1 G/DL (ref 32–36)
MCV RBC AUTO: 96 FL (ref 82–98)
MONOCYTES # BLD AUTO: 0.3 K/UL (ref 0.3–1)
MONOCYTES NFR BLD: 6 % (ref 4–15)
NEUTROPHILS # BLD AUTO: 3.1 K/UL (ref 1.8–7.7)
NEUTROPHILS NFR BLD: 53.5 % (ref 38–73)
NONHDLC SERPL-MCNC: 90 MG/DL
NRBC BLD-RTO: 0 /100 WBC
PLATELET # BLD AUTO: 315 K/UL (ref 150–450)
PMV BLD AUTO: 9.5 FL (ref 9.2–12.9)
POTASSIUM SERPL-SCNC: 3.9 MMOL/L (ref 3.5–5.1)
PROT SERPL-MCNC: 6.5 G/DL (ref 6–8.4)
RBC # BLD AUTO: 4.05 M/UL (ref 4–5.4)
SODIUM SERPL-SCNC: 138 MMOL/L (ref 136–145)
T4 FREE SERPL-MCNC: 0.93 NG/DL (ref 0.71–1.51)
TRIGL SERPL-MCNC: 83 MG/DL (ref 30–150)
TSH SERPL DL<=0.005 MIU/L-ACNC: 0.7 UIU/ML (ref 0.4–4)
WBC # BLD AUTO: 5.69 K/UL (ref 3.9–12.7)

## 2025-02-04 PROCEDURE — 84439 ASSAY OF FREE THYROXINE: CPT | Performed by: INTERNAL MEDICINE

## 2025-02-04 PROCEDURE — 80053 COMPREHEN METABOLIC PANEL: CPT | Performed by: INTERNAL MEDICINE

## 2025-02-04 PROCEDURE — 83735 ASSAY OF MAGNESIUM: CPT | Performed by: INTERNAL MEDICINE

## 2025-02-04 PROCEDURE — 80061 LIPID PANEL: CPT | Performed by: INTERNAL MEDICINE

## 2025-02-04 PROCEDURE — 36415 COLL VENOUS BLD VENIPUNCTURE: CPT | Performed by: INTERNAL MEDICINE

## 2025-02-04 PROCEDURE — 85025 COMPLETE CBC W/AUTO DIFF WBC: CPT | Performed by: INTERNAL MEDICINE

## 2025-02-04 PROCEDURE — 84443 ASSAY THYROID STIM HORMONE: CPT | Performed by: INTERNAL MEDICINE

## 2025-02-04 PROCEDURE — 83036 HEMOGLOBIN GLYCOSYLATED A1C: CPT | Performed by: INTERNAL MEDICINE

## 2025-02-04 PROCEDURE — 82728 ASSAY OF FERRITIN: CPT | Performed by: INTERNAL MEDICINE

## 2025-02-05 NOTE — TELEPHONE ENCOUNTER
Reviewed labs with patient.  There was no clinically significant results.  Her mother was a severe diabetic so given that history she was placed on metformin which she did not tolerate.  The patient had a significant elevation of her BMI however and was on Wegovy for weight loss which was just discontinued.  Discussion about other potential drugs besides metformin included that of Jardiance however after looking at her numbers on this lab though I wrote her the prescription I asked that she not pick it up given these particular numbers.  The patient is seen every 3 months because the her ADHD medicine

## 2025-02-17 ENCOUNTER — TELEPHONE (OUTPATIENT)
Dept: BARIATRICS | Facility: CLINIC | Age: 30
End: 2025-02-17
Payer: COMMERCIAL

## 2025-02-17 DIAGNOSIS — N83.202 LEFT OVARIAN CYST: Primary | ICD-10-CM

## 2025-02-25 ENCOUNTER — TELEPHONE (OUTPATIENT)
Dept: OPTOMETRY | Facility: CLINIC | Age: 30
End: 2025-02-25
Payer: COMMERCIAL

## 2025-02-27 ENCOUNTER — PATIENT MESSAGE (OUTPATIENT)
Dept: PRIMARY CARE CLINIC | Facility: CLINIC | Age: 30
End: 2025-02-27
Payer: COMMERCIAL

## 2025-02-27 DIAGNOSIS — F90.1 ADHD, HYPERACTIVE-IMPULSIVE TYPE: ICD-10-CM

## 2025-02-28 RX ORDER — DEXTROAMPHETAMINE SACCHARATE, AMPHETAMINE ASPARTATE MONOHYDRATE, DEXTROAMPHETAMINE SULFATE AND AMPHETAMINE SULFATE 7.5; 7.5; 7.5; 7.5 MG/1; MG/1; MG/1; MG/1
30 CAPSULE, EXTENDED RELEASE ORAL EVERY MORNING
Qty: 30 CAPSULE | Refills: 0 | Status: SHIPPED | OUTPATIENT
Start: 2025-02-28 | End: 2025-03-30

## 2025-03-10 ENCOUNTER — PATIENT MESSAGE (OUTPATIENT)
Dept: PRIMARY CARE CLINIC | Facility: CLINIC | Age: 30
End: 2025-03-10
Payer: COMMERCIAL

## 2025-03-11 ENCOUNTER — PATIENT MESSAGE (OUTPATIENT)
Dept: BARIATRICS | Facility: CLINIC | Age: 30
End: 2025-03-11
Payer: COMMERCIAL

## 2025-03-11 DIAGNOSIS — F90.1 ADHD, HYPERACTIVE-IMPULSIVE TYPE: ICD-10-CM

## 2025-03-11 RX ORDER — LISDEXAMFETAMINE DIMESYLATE 30 MG/1
30 CAPSULE ORAL EVERY MORNING
Qty: 30 CAPSULE | Refills: 0 | Status: SHIPPED | OUTPATIENT
Start: 2025-03-11 | End: 2025-04-10

## 2025-03-27 RX ORDER — TOPIRAMATE 25 MG/1
25 TABLET ORAL 2 TIMES DAILY
Qty: 180 TABLET | Refills: 0 | Status: SHIPPED | OUTPATIENT
Start: 2025-03-27 | End: 2025-06-26

## 2025-03-27 NOTE — PROGRESS NOTES
Patient has been off Ozempic for a couple months and has gained 17 lbs.  She is already on Vyvanse for ADHD.  Will start topiramate as she has taken it in the past and has history of migraine headaches.  Patient has been made aware and states understanding of birth defect risk while taking topiramate.

## 2025-04-09 DIAGNOSIS — F90.1 ADHD, HYPERACTIVE-IMPULSIVE TYPE: ICD-10-CM

## 2025-04-09 RX ORDER — DEXTROAMPHETAMINE SACCHARATE, AMPHETAMINE ASPARTATE MONOHYDRATE, DEXTROAMPHETAMINE SULFATE AND AMPHETAMINE SULFATE 7.5; 7.5; 7.5; 7.5 MG/1; MG/1; MG/1; MG/1
30 CAPSULE, EXTENDED RELEASE ORAL EVERY MORNING
Qty: 30 CAPSULE | Refills: 0 | Status: SHIPPED | OUTPATIENT
Start: 2025-04-09 | End: 2025-05-11

## 2025-04-10 ENCOUNTER — PATIENT MESSAGE (OUTPATIENT)
Dept: PRIMARY CARE CLINIC | Facility: CLINIC | Age: 30
End: 2025-04-10
Payer: COMMERCIAL

## 2025-05-09 ENCOUNTER — OFFICE VISIT (OUTPATIENT)
Dept: PRIMARY CARE CLINIC | Facility: CLINIC | Age: 30
End: 2025-05-09
Payer: COMMERCIAL

## 2025-05-09 ENCOUNTER — PATIENT MESSAGE (OUTPATIENT)
Dept: PRIMARY CARE CLINIC | Facility: CLINIC | Age: 30
End: 2025-05-09

## 2025-05-09 VITALS
BODY MASS INDEX: 24.14 KG/M2 | HEART RATE: 97 BPM | SYSTOLIC BLOOD PRESSURE: 114 MMHG | HEIGHT: 64 IN | DIASTOLIC BLOOD PRESSURE: 77 MMHG

## 2025-05-09 DIAGNOSIS — F90.1 ADHD, HYPERACTIVE-IMPULSIVE TYPE: ICD-10-CM

## 2025-05-09 NOTE — PROGRESS NOTES
30-year-old woman with a history of ADHD, dysthymia, migraine, acid reflux, here for three-month follow-up of ADHD for medication.    Pertinent review of systems:  ADHD:  The patient is on Adderall 7 days a week in his not take a holiday on weekends.  She notes that she may be going back to school.  She continues to have anxiety not associated with the medication.  The patient is having no pounding of her heart or palpitations.  She has no unexplained diaphoresis.  The patient has had tremors.  The patient has minimal issues with mood and often will use BuSpar.    Dysthymia:  The patient uses her BuSpar twice daily.  She notes that it controls her anxiety.  The patient notes fatigue but no other potential symptoms of depression.  She is excited about doing things with her daughter particularly recently her daughter is riding a bicycle without training.  As noted she is thinking about going back to school.    Problem list, medication list, and allergies have been reviewed.  The patient develops hives from amoxicillin.    Review of systems is otherwise negative.    Physical Exam  Vitals and nursing note reviewed.   Constitutional:       General: She is not in acute distress.     Appearance: She is normal weight. She is not ill-appearing or diaphoretic.   HENT:      Head: Atraumatic.      Nose: Nose normal. No congestion.      Mouth/Throat:      Mouth: Mucous membranes are moist.      Pharynx: Oropharynx is clear. No posterior oropharyngeal erythema.   Eyes:      General: No scleral icterus.     Conjunctiva/sclera: Conjunctivae normal.      Comments: Fundi without exudate hemorrhage or scar.  Retinal vessels are normal   Neck:      Vascular: No carotid bruit.      Comments: Thyroid without enlargement, nodule, or bruit  Cardiovascular:      Rate and Rhythm: Normal rate and regular rhythm.      Pulses: Normal pulses.      Heart sounds: Normal heart sounds. No murmur heard.     No gallop.   Pulmonary:      Effort:  Pulmonary effort is normal. No respiratory distress.      Breath sounds: Normal breath sounds. No wheezing, rhonchi or rales.   Abdominal:      General: Bowel sounds are normal.      Palpations: Abdomen is soft.      Tenderness: There is no abdominal tenderness.      Comments: No hepatosplenomegaly   Musculoskeletal:         General: No tenderness.      Cervical back: Neck supple. No tenderness.      Right lower leg: No edema.      Left lower leg: No edema.   Lymphadenopathy:      Cervical: No cervical adenopathy.   Skin:     Coloration: Skin is not jaundiced or pale.   Neurological:      General: No focal deficit present.      Mental Status: She is alert and oriented to person, place, and time.   Psychiatric:         Mood and Affect: Mood normal.         Behavior: Behavior normal.      Assessment/plan:   ADHD:  The patient states she is doing well on Adderall which will be continued in refilled.  She has had no side effects of the medication.    Plan: Continue current management   Refill Adderall  Appointment 3 months    Dysthymia /anxiety:  The patient's symptoms are primarily anxiety.  Moods are controlled with the BuSpar.  She is taking it daily and usually twice a day.  She has no symptoms of significant depression.  Plan: Continue current management

## 2025-05-11 ENCOUNTER — PATIENT MESSAGE (OUTPATIENT)
Dept: OPTOMETRY | Facility: CLINIC | Age: 30
End: 2025-05-11
Payer: COMMERCIAL

## 2025-05-12 ENCOUNTER — PATIENT MESSAGE (OUTPATIENT)
Dept: PRIMARY CARE CLINIC | Facility: CLINIC | Age: 30
End: 2025-05-12
Payer: COMMERCIAL

## 2025-05-12 DIAGNOSIS — F90.1 ADHD, HYPERACTIVE-IMPULSIVE TYPE: ICD-10-CM

## 2025-05-12 RX ORDER — DEXTROAMPHETAMINE SACCHARATE, AMPHETAMINE ASPARTATE MONOHYDRATE, DEXTROAMPHETAMINE SULFATE AND AMPHETAMINE SULFATE 7.5; 7.5; 7.5; 7.5 MG/1; MG/1; MG/1; MG/1
30 CAPSULE, EXTENDED RELEASE ORAL EVERY MORNING
Qty: 30 CAPSULE | Refills: 0 | Status: SHIPPED | OUTPATIENT
Start: 2025-05-12 | End: 2025-06-12

## 2025-06-03 ENCOUNTER — OFFICE VISIT (OUTPATIENT)
Dept: INTERNAL MEDICINE | Facility: CLINIC | Age: 30
End: 2025-06-03
Payer: COMMERCIAL

## 2025-06-03 ENCOUNTER — PATIENT MESSAGE (OUTPATIENT)
Dept: INTERNAL MEDICINE | Facility: CLINIC | Age: 30
End: 2025-06-03

## 2025-06-03 PROCEDURE — 99499 UNLISTED E&M SERVICE: CPT | Mod: 95,,,

## 2025-06-03 RX ORDER — SEMAGLUTIDE 1 MG/.5ML
1 INJECTION, SOLUTION SUBCUTANEOUS
Qty: 2 ML | Refills: 0 | Status: ACTIVE | OUTPATIENT
Start: 2025-06-03

## 2025-06-03 RX ORDER — SEMAGLUTIDE 0.25 MG/.5ML
0.25 INJECTION, SOLUTION SUBCUTANEOUS
Qty: 2 ML | Refills: 0 | Status: ACTIVE | OUTPATIENT
Start: 2025-06-03

## 2025-06-03 RX ORDER — SEMAGLUTIDE 0.5 MG/.5ML
0.5 INJECTION, SOLUTION SUBCUTANEOUS
Qty: 2 ML | Refills: 0 | Status: ACTIVE | OUTPATIENT
Start: 2025-06-03

## 2025-06-10 DIAGNOSIS — F90.1 ADHD, HYPERACTIVE-IMPULSIVE TYPE: ICD-10-CM

## 2025-06-11 ENCOUNTER — PATIENT MESSAGE (OUTPATIENT)
Dept: PRIMARY CARE CLINIC | Facility: CLINIC | Age: 30
End: 2025-06-11
Payer: COMMERCIAL

## 2025-06-11 RX ORDER — DEXTROAMPHETAMINE SACCHARATE, AMPHETAMINE ASPARTATE MONOHYDRATE, DEXTROAMPHETAMINE SULFATE AND AMPHETAMINE SULFATE 7.5; 7.5; 7.5; 7.5 MG/1; MG/1; MG/1; MG/1
30 CAPSULE, EXTENDED RELEASE ORAL EVERY MORNING
Qty: 30 CAPSULE | Refills: 0 | Status: SHIPPED | OUTPATIENT
Start: 2025-06-11 | End: 2025-07-12

## 2025-07-10 ENCOUNTER — PATIENT MESSAGE (OUTPATIENT)
Dept: PRIMARY CARE CLINIC | Facility: CLINIC | Age: 30
End: 2025-07-10
Payer: COMMERCIAL

## 2025-07-10 DIAGNOSIS — F90.1 ADHD, HYPERACTIVE-IMPULSIVE TYPE: ICD-10-CM

## 2025-07-10 RX ORDER — TOPIRAMATE 25 MG/1
25 TABLET, FILM COATED ORAL 2 TIMES DAILY
Qty: 180 TABLET | Refills: 0 | Status: SHIPPED | OUTPATIENT
Start: 2025-07-10 | End: 2025-10-08

## 2025-07-10 RX ORDER — BUSPIRONE HYDROCHLORIDE 7.5 MG/1
7.5 TABLET ORAL 2 TIMES DAILY
Qty: 60 TABLET | Refills: 3 | Status: SHIPPED | OUTPATIENT
Start: 2025-07-10 | End: 2026-07-10

## 2025-07-10 RX ORDER — DEXTROAMPHETAMINE SACCHARATE, AMPHETAMINE ASPARTATE MONOHYDRATE, DEXTROAMPHETAMINE SULFATE AND AMPHETAMINE SULFATE 7.5; 7.5; 7.5; 7.5 MG/1; MG/1; MG/1; MG/1
30 CAPSULE, EXTENDED RELEASE ORAL EVERY MORNING
Qty: 30 CAPSULE | Refills: 0 | Status: SHIPPED | OUTPATIENT
Start: 2025-07-10 | End: 2025-08-11

## 2025-08-11 ENCOUNTER — OFFICE VISIT (OUTPATIENT)
Dept: PRIMARY CARE CLINIC | Facility: CLINIC | Age: 30
End: 2025-08-11
Payer: COMMERCIAL

## 2025-08-11 DIAGNOSIS — F90.1 ADHD, HYPERACTIVE-IMPULSIVE TYPE: ICD-10-CM

## 2025-08-11 PROCEDURE — 99499 UNLISTED E&M SERVICE: CPT | Mod: S$GLB,,, | Performed by: INTERNAL MEDICINE

## 2025-08-11 RX ORDER — DEXTROAMPHETAMINE SACCHARATE, AMPHETAMINE ASPARTATE MONOHYDRATE, DEXTROAMPHETAMINE SULFATE AND AMPHETAMINE SULFATE 7.5; 7.5; 7.5; 7.5 MG/1; MG/1; MG/1; MG/1
30 CAPSULE, EXTENDED RELEASE ORAL EVERY MORNING
Qty: 30 CAPSULE | Refills: 0 | Status: SHIPPED | OUTPATIENT
Start: 2025-08-11 | End: 2025-09-11

## 2025-08-28 ENCOUNTER — OFFICE VISIT (OUTPATIENT)
Dept: PRIMARY CARE CLINIC | Facility: CLINIC | Age: 30
End: 2025-08-28
Payer: COMMERCIAL

## 2025-08-28 VITALS
SYSTOLIC BLOOD PRESSURE: 114 MMHG | WEIGHT: 162.25 LBS | TEMPERATURE: 98 F | OXYGEN SATURATION: 100 % | DIASTOLIC BLOOD PRESSURE: 76 MMHG | HEART RATE: 86 BPM | HEIGHT: 64 IN | BODY MASS INDEX: 27.7 KG/M2

## 2025-08-28 DIAGNOSIS — F90.1 ADHD, HYPERACTIVE-IMPULSIVE TYPE: ICD-10-CM

## 2025-08-28 DIAGNOSIS — R11.0 NAUSEA: ICD-10-CM

## 2025-08-28 PROCEDURE — 99499 UNLISTED E&M SERVICE: CPT | Mod: S$GLB,,, | Performed by: INTERNAL MEDICINE

## 2025-08-28 RX ORDER — OMEPRAZOLE 20 MG/1
20 CAPSULE, DELAYED RELEASE ORAL DAILY
Qty: 90 CAPSULE | Refills: 3 | Status: SHIPPED | OUTPATIENT
Start: 2025-08-28 | End: 2026-08-28

## 2025-08-28 RX ORDER — ONDANSETRON 8 MG/1
8 TABLET, ORALLY DISINTEGRATING ORAL EVERY 6 HOURS PRN
Qty: 30 TABLET | Refills: 1 | Status: SHIPPED | OUTPATIENT
Start: 2025-08-28

## 2025-08-28 RX ORDER — DEXTROAMPHETAMINE SACCHARATE, AMPHETAMINE ASPARTATE MONOHYDRATE, DEXTROAMPHETAMINE SULFATE AND AMPHETAMINE SULFATE 7.5; 7.5; 7.5; 7.5 MG/1; MG/1; MG/1; MG/1
30 CAPSULE, EXTENDED RELEASE ORAL EVERY MORNING
Qty: 30 CAPSULE | Refills: 0 | Status: SHIPPED | OUTPATIENT
Start: 2025-08-28 | End: 2025-09-27

## 2025-08-28 RX ORDER — BUSPIRONE HYDROCHLORIDE 10 MG/1
10 TABLET ORAL 2 TIMES DAILY
Qty: 60 TABLET | Refills: 3 | Status: SHIPPED | OUTPATIENT
Start: 2025-08-28 | End: 2026-08-28

## 2025-08-28 RX ORDER — BUSPIRONE HYDROCHLORIDE 10 MG/1
10 TABLET ORAL 2 TIMES DAILY
Qty: 60 TABLET | Refills: 3 | Status: SHIPPED | OUTPATIENT
Start: 2025-08-28 | End: 2025-08-28 | Stop reason: SDUPTHER

## 2025-08-28 RX ORDER — TOPIRAMATE 25 MG/1
25 TABLET, FILM COATED ORAL 3 TIMES DAILY
Qty: 270 TABLET | Refills: 0 | Status: SHIPPED | OUTPATIENT
Start: 2025-08-28 | End: 2025-11-26

## 2025-09-04 RX ORDER — SEMAGLUTIDE 1 MG/.5ML
1 INJECTION, SOLUTION SUBCUTANEOUS
Qty: 2 ML | Refills: 0 | Status: ACTIVE | OUTPATIENT
Start: 2025-09-04

## 2025-09-04 RX ORDER — SEMAGLUTIDE 0.5 MG/.5ML
0.5 INJECTION, SOLUTION SUBCUTANEOUS
Qty: 2 ML | Refills: 0 | Status: ACTIVE | OUTPATIENT
Start: 2025-09-04

## 2025-09-04 RX ORDER — SEMAGLUTIDE 0.25 MG/.5ML
0.25 INJECTION, SOLUTION SUBCUTANEOUS
Qty: 2 ML | Refills: 0 | Status: ACTIVE | OUTPATIENT
Start: 2025-09-04

## (undated) DEVICE — BLADE SCALPEL #11 371111

## (undated) DEVICE — SUTURE MONOCRYL 3-0 27 PS-2 MCP427H

## (undated) DEVICE — CLIP APPLIER MSM20 STERILMED  ETHMSM20

## (undated) DEVICE — UNDERGLOVE BIOGEL PI MICRO BLUE SZ 8

## (undated) DEVICE — BLADE SCALPEL #15 371115

## (undated) DEVICE — SOLUTION IRRI NS BOTTLE 1000ML R5200-01

## (undated) DEVICE — SOL POVIDONE PREP IODINE 4 OZ

## (undated) DEVICE — BLADE SCALPEL #10 371110

## (undated) DEVICE — GLOVE BIOGEL SKINSENSE PI 6.5

## (undated) DEVICE — TIP BOVIE TEFLON E1450X

## (undated) DEVICE — SET BASIN 48X48IN 6000ML RING

## (undated) DEVICE — SEAL LENS SCOPE MYOSURE

## (undated) DEVICE — SUTURE SILK 2-0 SH 30 K833H

## (undated) DEVICE — PENCIL BOVIE E2515H

## (undated) DEVICE — GOWN X-LARGE 044674

## (undated) DEVICE — PAD BOVIE ADULT

## (undated) DEVICE — SYRINGE LUER-LOCK 50ML 309653

## (undated) DEVICE — CLIP APPLIER MEDIUM MSM20

## (undated) DEVICE — APPLICATOR 6 Q-TIPS 2PK 8884541400

## (undated) DEVICE — SYRINGE CATH TIP 60ML 309620

## (undated) DEVICE — Device

## (undated) DEVICE — TRAY GENERAL SURGERY

## (undated) DEVICE — SUTURE MONOCRYL 4-0 PS-2 27 MCP426H

## (undated) DEVICE — BANDAGE KERLIX   441106

## (undated) DEVICE — GLOVE BIOGEL PI ULTRA TOUCH G GRAY SZ 7

## (undated) DEVICE — SYS. SKIN CLOSURE DERMABOND PRINEO

## (undated) DEVICE — SUCTION YANKAUER BULB TIP W/O VENT

## (undated) DEVICE — PAD ABD 5X9   7196D

## (undated) DEVICE — SUTURE VICRYL 2-0 CT-1 36 VCP945H

## (undated) DEVICE — VIDEO RENTAL SERVICE CYSTO

## (undated) DEVICE — SPONGE DRAIN 4X4 441407

## (undated) DEVICE — SOL PVP-I SCRUB 7.5% 4OZ

## (undated) DEVICE — PACK FLUENT DISPOSABLE

## (undated) DEVICE — CLIP APPLIER MCS20 STERILMED ETHMCS20

## (undated) DEVICE — SUTURE PROLENE 2-0 CT-1 30 8423H

## (undated) DEVICE — GLOVE BIOGEL MICRO SURGEON PINK SZ 7.5

## (undated) DEVICE — DRAPE 3/4

## (undated) DEVICE — SOLUTION PEROXIDE 4OZ HP0304

## (undated) DEVICE — STAPLER SKIN NON ROTATE PXW35

## (undated) DEVICE — TOWEL OR BLUE      MDT2168284

## (undated) DEVICE — SOL NACL IRR 3000ML

## (undated) DEVICE — SOL IRR SOD CHL .9% POUR

## (undated) DEVICE — SOUND DISPOSABLE UTERINE W/NOT

## (undated) DEVICE — SYRINGE 30ML 302832